# Patient Record
Sex: MALE | Race: WHITE | NOT HISPANIC OR LATINO | Employment: OTHER | URBAN - METROPOLITAN AREA
[De-identification: names, ages, dates, MRNs, and addresses within clinical notes are randomized per-mention and may not be internally consistent; named-entity substitution may affect disease eponyms.]

---

## 2017-01-03 DIAGNOSIS — G47.9 SLEEP DISTURBANCE: ICD-10-CM

## 2017-01-03 RX ORDER — ALPRAZOLAM 1 MG/1
1 TABLET ORAL NIGHTLY PRN
Qty: 30 TAB | Refills: 0 | Status: SHIPPED
Start: 2017-01-03 | End: 2017-02-10 | Stop reason: SDUPTHER

## 2017-02-10 DIAGNOSIS — G47.9 SLEEP DISTURBANCE: ICD-10-CM

## 2017-02-10 NOTE — TELEPHONE ENCOUNTER
Was the patient seen in the last year in this department? Yes  refill 1/9/17    Does patient have an active prescription for medications requested? No     Received Request Via: Patient

## 2017-02-13 RX ORDER — ALPRAZOLAM 1 MG/1
1 TABLET ORAL NIGHTLY PRN
Qty: 30 TAB | Refills: 0 | Status: SHIPPED | OUTPATIENT
Start: 2017-02-13 | End: 2017-12-07 | Stop reason: SDUPTHER

## 2017-02-13 RX ORDER — ALPRAZOLAM 1 MG/1
TABLET ORAL
Qty: 30 TAB | Refills: 0 | Status: SHIPPED | OUTPATIENT
Start: 2017-02-13 | End: 2017-03-16 | Stop reason: SDUPTHER

## 2017-02-13 NOTE — TELEPHONE ENCOUNTER
Prescription called in to:    [facility]/PHARMACY #[id number] - [place], [place] - [address]  [address] [address]  Phone: [phone] Fax: [phone]  .

## 2017-02-13 NOTE — TELEPHONE ENCOUNTER
Was the patient seen in the last year in this department? Yes     Does patient have an active prescription for medications requested? No     Received Request Via: Pharmacy     Per  last fill: 1-9-17 # 30

## 2017-04-06 RX ORDER — PROPRANOLOL HYDROCHLORIDE 20 MG/1
TABLET ORAL
Qty: 60 TAB | Refills: 1 | Status: SHIPPED | OUTPATIENT
Start: 2017-04-06

## 2017-04-13 RX ORDER — ALPRAZOLAM 1 MG/1
TABLET ORAL
Qty: 30 TAB | Refills: 2 | Status: SHIPPED
Start: 2017-04-13

## 2017-04-13 NOTE — TELEPHONE ENCOUNTER
Was the patient seen in the last year in this department? Yes     Does patient have an active prescription for medications requested? No     Received Request Via: Pharmacy   Last filled 3/16/17 per

## 2017-08-09 DIAGNOSIS — G47.00 INSOMNIA, UNSPECIFIED TYPE: ICD-10-CM

## 2017-08-09 RX ORDER — ALPRAZOLAM 1 MG/1
TABLET ORAL
Qty: 15 TAB | Refills: 0 | Status: SHIPPED
Start: 2017-08-09

## 2017-08-09 NOTE — TELEPHONE ENCOUNTER
Was the patient seen in the last year in this department? No     Does patient have an active prescription for medications requested? No     Received Request Via: Pharmacy     Last visit:6/8/17

## 2017-09-11 DIAGNOSIS — G47.9 SLEEP DISTURBANCE: ICD-10-CM

## 2017-09-11 RX ORDER — ALPRAZOLAM 1 MG/1
TABLET ORAL
Qty: 15 TAB | Refills: 0 | Status: SHIPPED
Start: 2017-09-11

## 2017-09-11 NOTE — TELEPHONE ENCOUNTER
Was the patient seen in the last year in this department? No     Does patient have an active prescription for medications requested? No     Received Request Via: Pharmacy     Last visit:6/8/16    Last  fill:8/9/17

## 2017-10-10 RX ORDER — ALPRAZOLAM 1 MG/1
TABLET ORAL
Qty: 15 TAB | Refills: 0 | Status: SHIPPED
Start: 2017-10-10

## 2017-10-10 NOTE — TELEPHONE ENCOUNTER
Was the patient seen in the last year in this department? No LOV: 6-8-16    Does patient have an active prescription for medications requested? No     Received Request Via: Pharmacy     Per  last fill: 9-11-17 # 15

## 2017-11-08 RX ORDER — ALPRAZOLAM 1 MG/1
TABLET ORAL
Qty: 15 TAB | Refills: 0 | Status: SHIPPED
Start: 2017-11-08

## 2017-11-08 NOTE — TELEPHONE ENCOUNTER
Was the patient seen in the last year in this department? No     Does patient have an active prescription for medications requested? No     Received Request Via: Pharmacy     Last visit:6/8/16    Last  fill:10/10/17

## 2017-12-07 DIAGNOSIS — G47.9 SLEEP DISTURBANCE: ICD-10-CM

## 2017-12-07 RX ORDER — ALPRAZOLAM 1 MG/1
1 TABLET ORAL NIGHTLY PRN
Qty: 5 TAB | Refills: 0 | Status: SHIPPED
Start: 2017-12-07

## 2017-12-07 NOTE — TELEPHONE ENCOUNTER
Was the patient seen in the last year in this department? No 6/16  refill 11/9/17    Does patient have an active prescription for medications requested? No     Received Request Via: Pharmacy

## 2021-03-15 DIAGNOSIS — Z23 NEED FOR VACCINATION: ICD-10-CM

## 2022-05-04 NOTE — TELEPHONE ENCOUNTER
Was the patient seen in the last year in this department? Yes  Last Fill 11/29/16 #30    Does patient have an active prescription for medications requested? No     Received Request Via: Patient       no history of blood product transfusion

## 2024-12-09 ENCOUNTER — OFFICE VISIT (OUTPATIENT)
Dept: SURGICAL ONCOLOGY | Facility: MEDICAL CENTER | Age: 59
End: 2024-12-09
Payer: MEDICARE

## 2024-12-09 VITALS
BODY MASS INDEX: 28.14 KG/M2 | HEART RATE: 62 BPM | WEIGHT: 201 LBS | SYSTOLIC BLOOD PRESSURE: 168 MMHG | DIASTOLIC BLOOD PRESSURE: 102 MMHG | TEMPERATURE: 98 F | HEIGHT: 71 IN | OXYGEN SATURATION: 99 %

## 2024-12-09 DIAGNOSIS — K62.89 RECTAL MASS: Primary | ICD-10-CM

## 2024-12-09 PROCEDURE — 99205 OFFICE O/P NEW HI 60 MIN: CPT | Performed by: SURGERY

## 2024-12-09 PROCEDURE — 3080F DIAST BP >= 90 MM HG: CPT | Performed by: SURGERY

## 2024-12-09 PROCEDURE — 3077F SYST BP >= 140 MM HG: CPT | Performed by: SURGERY

## 2024-12-09 NOTE — PROGRESS NOTES
Subjective:   12/9/2024  1:16 PM  Primary care physician:Pcp Pt States None      Chief Complaint:   Chief Complaint   Patient presents with    New Patient     Rectal mass     Diagnosis:   1. Rectal cancer (HCC)  CT-CHEST,ABDOMEN,PELVIS WITH    MR-PELVIS WITH    CEA    CBC WITH DIFFERENTIAL    Comp Metabolic Panel    CT-CHEST,ABDOMEN,PELVIS WITH    MR-PELVIS WITH    CEA          History of presenting illness:  Martínez Lyons  is a pleasant 59 y.o. year old male with history of melena who presented with Rectal mass found that [facility] by Dr. Wesley Mckeon on colonoscopy.  Notably the patient had a number of benign polyps and in the rectum there was a large, fixed, friable tumor extending for half the circumference 6 cm from the anal verge.  Final pathology confirmed fragment of tubular adenoma with high-grade dysplasia cannot exclude adenocarcinoma.  Past Medical History:   Diagnosis Date    Acute renal failure (HCC)     Hepatic cirrhosis (HCC)     liver failure 2009;      Psychiatric disorder     ETOH and drug addiction     Past Surgical History:   Procedure Laterality Date    HERNIA REPAIR      inguinal - right 1993    LAMINOTOMY      microdiscetomy multiple levels - L3-S1 - in [place] - 2001    OTHER ABDOMINAL SURGERY      inguinal hernia repair    OTHER ORTHOPEDIC SURGERY      rt elbow repair     No Known Allergies  Outpatient Encounter Medications as of 12/9/2024   Medication Sig Dispense Refill    alprazolam (XANAX) 1 MG Tab Take 1 Tab by mouth at bedtime as needed for Sleep. (Patient not taking: Reported on 12/9/2024) 5 Tab 0    alprazolam (XANAX) 1 MG Tab TAKE 1 TABLET BY MOUTH AT BEDTIME (Patient not taking: Reported on 12/9/2024) 15 Tab 0    alprazolam (XANAX) 1 MG Tab TAKE 1 TABLET BY MOUTH AT BEDTIME (Patient not taking: Reported on 12/9/2024) 15 Tab 0    alprazolam (XANAX) 1 MG Tab TAKE 1 TABLET BY MOUTH AT BEDTIME (Patient not taking: Reported on 12/9/2024) 15 Tab 0     alprazolam (XANAX) 1 MG Tab TAKE 1 TABLET BY MOUTH AT BEDTIME (Patient not taking: Reported on 12/9/2024) 15 Tab 0    alprazolam (XANAX) 1 MG Tab TAKE 1 TABLET BY MOUTH AT BEDTIME (Patient not taking: Reported on 12/9/2024) 15 Tab 0    alprazolam (XANAX) 1 MG Tab TAKE 1 TABLET BY MOUTH AT BEDTIME (Patient not taking: Reported on 12/9/2024) 30 Tab 2    propranolol (INDERAL) 20 MG Tab TAKE 1 TABLET BY MOUTH TWICE A DAY (Patient not taking: Reported on 12/9/2024) 60 Tab 1    alprazolam (XANAX) 1 MG Tab TAKE 1 TAB BY MOUTH AT BEDTIME (Patient not taking: Reported on 12/9/2024) 30 Tab 0    morphine SR (MS CONTIN) 15 MG Tab CR tablet Take 1 Tab by mouth every 12 hours. (Patient not taking: Reported on 12/9/2024) 56 Tab 0    zolpidem (AMBIEN) 10 MG Tab TAKE 1 TABLET BY MOUTH AT BEDTIME AS NEEDED (Patient not taking: Reported on 12/9/2024) 30 Tab 3    omeprazole (PRILOSEC) 10 MG CPDR Take 20 mg by mouth every day. (Patient not taking: Reported on 12/9/2024)       No facility-administered encounter medications on file as of 12/9/2024.     Social History     Socioeconomic History    Marital status: Single     Spouse name: Not on file    Number of children: Not on file    Years of education: Not on file    Highest education level: Not on file   Occupational History    Not on file   Tobacco Use    Smoking status: Every Day     Current packs/day: 0.50     Average packs/day: 0.5 packs/day for 28.0 years (14.0 ttl pk-yrs)     Types: Cigarettes    Smokeless tobacco: Not on file    Tobacco comments:     1/2 pk/day   Substance and Sexual Activity    Alcohol use: No     Comment: sober for 6 YRS; hx alcoholism [id number]    Drug use: No     Comment: quit 21 months ago. hx heroin    Sexual activity: Not on file     Comment: engaged; one child; disabled   Other Topics Concern    Not on file   Social History Narrative    Not on file     Social Drivers of Health     Financial Resource Strain: Not on file   Food Insecurity: Not on file  "  Transportation Needs: Not on file   Physical Activity: Not on file   Stress: Not on file   Social Connections: Not on file   Intimate Partner Violence: Not on file   Housing Stability: Not on file      Social History     Tobacco Use   Smoking Status Every Day    Current packs/day: 0.50    Average packs/day: 0.5 packs/day for 28.0 years (14.0 ttl pk-yrs)    Types: Cigarettes   Smokeless Tobacco Not on file   Tobacco Comments    1/2 pk/day     Social History     Substance and Sexual Activity   Alcohol Use No    Comment: sober for 6 YRS; hx alcoholism [id number]     Social History     Substance and Sexual Activity   Drug Use No    Comment: quit 21 months ago. hx heroin        Family History   Problem Relation Age of Onset    Heart Disease Father      Review of Systems   Constitutional:  Negative for chills, fever, malaise/fatigue and weight loss.   Eyes:  Negative for blurred vision.   Respiratory:  Negative for cough, hemoptysis and shortness of breath.    Cardiovascular:  Negative for chest pain and leg swelling.   Gastrointestinal:  Negative for blood in stool, constipation, diarrhea, nausea and vomiting.   Genitourinary:  Negative for dysuria, hematuria and urgency.   Musculoskeletal:  Negative for back pain, falls and joint pain.   Skin:  Negative for rash.   Neurological:  Negative for dizziness, weakness and headaches.   Endo/Heme/Allergies: Negative.  Does not bruise/bleed easily.   Psychiatric/Behavioral:  Negative for depression. The patient is not nervous/anxious.         Objective:   BP (!) 168/102 (BP Location: Right arm, Patient Position: Sitting, BP Cuff Size: Adult)   Pulse 62   Temp 36.7 °C (98 °F) (Temporal)   Ht 1.803 m (5' 11\")   Wt 91.2 kg (201 lb)   SpO2 99%   BMI 28.03 kg/m²     Physical Exam  Vitals and nursing note reviewed.   Constitutional:       Appearance: Normal appearance. He is normal weight.   HENT:      Head: Normocephalic and atraumatic.      Nose: Nose normal.      Mouth/Throat: "      Mouth: Mucous membranes are moist.   Eyes:      Extraocular Movements: Extraocular movements intact.   Cardiovascular:      Rate and Rhythm: Normal rate.      Pulses: Normal pulses.   Pulmonary:      Effort: Pulmonary effort is normal.      Breath sounds: Normal breath sounds.   Abdominal:      General: Abdomen is flat.      Palpations: Abdomen is soft.   Genitourinary:     Comments: Rectal examination is deferred until time of flexible sigmoidoscopy  Musculoskeletal:         General: Normal range of motion.      Cervical back: Normal range of motion.   Skin:     General: Skin is warm and dry.   Neurological:      Mental Status: He is alert and oriented to person, place, and time.   Psychiatric:         Mood and Affect: Mood normal.         Behavior: Behavior normal.         Thought Content: Thought content normal.         Judgment: Judgment normal.             Pathology:  Pathology from colonoscopy dated 10/30/2024  Ascending colon polyp was tubular adenoma  Transverse colon polyp was tubulovillous adenoma  Sigmoid colon polyp tubular adenoma  Rectal biopsy with fragmented tubular adenoma with high-grade dysplasia cannot exclude adenocarcinoma      Diagnosis:     1. Rectal cancer (HCC)  CT-CHEST,ABDOMEN,PELVIS WITH    MR-PELVIS WITH    CEA    CBC WITH DIFFERENTIAL    Comp Metabolic Panel    CT-CHEST,ABDOMEN,PELVIS WITH    MR-PELVIS WITH    CEA              Medical Decision Making:  Today's Assessment / Status / Plan:     patient is a very high risk for occult cancer and he will be scheduled for flexible sigmoidoscopy with biopsy as well as staging workup to include CT chest abdomen pelvis and MRI as well as blood work to include CEA in accordance with NCCN guidelines.  Risks, benefits, and alternatives were discussed with consenting person(s). Consenting person(s) were given an opportunity to ask questions and discuss other options. Risks including but not limited to failed or incomplete planned procedure,  ineffective therapy, perforation, infection, bleeding, missed lesion(s), missed diagnosis, cardiac and/or pulmonary event, aspiration, stroke, possible need for surgery, hospitalization possibly prolonged, discomfort, unsuccessful and/or incomplete procedure, possible need for repeat procedures and/or additional testings, damage to adjacent organs and/or vascular structures, medication reaction, disability, death, and other adverse events possibly life-threatening. Discussion was undertaken with Layman's terms. Consenting persons stated understanding and acceptance of these risks, and wished to proceed. Consent was given in clear state of mind.  Chriss Soliz MD PhD  [facility]  Colon and Rectal Surgeon  [phone]

## 2024-12-10 ENCOUNTER — APPOINTMENT (OUTPATIENT)
Dept: ADMISSIONS | Facility: MEDICAL CENTER | Age: 59
End: 2024-12-10
Attending: SURGERY
Payer: MEDICARE

## 2024-12-11 ENCOUNTER — TELEPHONE (OUTPATIENT)
Dept: SURGICAL ONCOLOGY | Facility: MEDICAL CENTER | Age: 59
End: 2024-12-11
Payer: MEDICARE

## 2024-12-16 ENCOUNTER — TELEPHONE (OUTPATIENT)
Dept: SURGICAL ONCOLOGY | Facility: MEDICAL CENTER | Age: 59
End: 2024-12-16
Payer: MEDICARE

## 2024-12-16 ENCOUNTER — PRE-ADMISSION TESTING (OUTPATIENT)
Dept: ADMISSIONS | Facility: MEDICAL CENTER | Age: 59
End: 2024-12-16
Attending: SURGERY
Payer: MEDICARE

## 2024-12-16 RX ORDER — PANTOPRAZOLE SODIUM 40 MG/1
40 TABLET, DELAYED RELEASE ORAL
COMMUNITY
Start: 2024-10-18

## 2024-12-16 ASSESSMENT — ENCOUNTER SYMPTOMS
NAUSEA: 0
CONSTIPATION: 0
DIARRHEA: 0
BACK PAIN: 0
COUGH: 0
FALLS: 0
BLURRED VISION: 0
BRUISES/BLEEDS EASILY: 0
DIZZINESS: 0
DEPRESSION: 0
HEMOPTYSIS: 0
CHILLS: 0
BLOOD IN STOOL: 0
NERVOUS/ANXIOUS: 0
FEVER: 0
VOMITING: 0
SHORTNESS OF BREATH: 0
HEADACHES: 0
WEIGHT LOSS: 0
WEAKNESS: 0

## 2024-12-16 NOTE — PREADMIT AVS NOTE
Current Medications   Medication Instructions    pantoprazole (PROTONIX) 40 MG Tablet Delayed Response Continue taking medication as prescribed, including morning of procedure

## 2024-12-17 NOTE — TELEPHONE ENCOUNTER
Patient called stating he is trying to schedule the CT and MRI Dr. Soliz wanted done.But it looks like it hasn't been ordered.  Could you please put in orders, Thank you

## 2024-12-18 DIAGNOSIS — K62.89 RECTAL MASS: ICD-10-CM

## 2024-12-27 ENCOUNTER — HOSPITAL ENCOUNTER (OUTPATIENT)
Dept: RADIOLOGY | Facility: MEDICAL CENTER | Age: 59
End: 2024-12-27
Attending: SURGERY
Payer: MEDICARE

## 2024-12-27 DIAGNOSIS — K62.89 RECTAL MASS: ICD-10-CM

## 2024-12-27 PROCEDURE — 700117 HCHG RX CONTRAST REV CODE 255: Mod: JZ | Performed by: SURGERY

## 2024-12-27 PROCEDURE — 71260 CT THORAX DX C+: CPT

## 2024-12-27 PROCEDURE — 700117 HCHG RX CONTRAST REV CODE 255: Performed by: SURGERY

## 2024-12-27 PROCEDURE — 700111 HCHG RX REV CODE 636 W/ 250 OVERRIDE (IP): Mod: JZ,JG | Performed by: RADIOLOGY

## 2024-12-27 PROCEDURE — A9579 GAD-BASE MR CONTRAST NOS,1ML: HCPCS | Mod: JZ | Performed by: SURGERY

## 2024-12-27 PROCEDURE — 72197 MRI PELVIS W/O & W/DYE: CPT

## 2024-12-27 RX ADMIN — GADOTERIDOL 20 ML: 279.3 INJECTION, SOLUTION INTRAVENOUS at 14:39

## 2024-12-27 RX ADMIN — GLUCAGON 1 MG: 1 INJECTION, POWDER, LYOPHILIZED, FOR SOLUTION INTRAMUSCULAR; INTRAVENOUS at 13:20

## 2024-12-27 RX ADMIN — IOHEXOL 100 ML: 350 INJECTION, SOLUTION INTRAVENOUS at 15:38

## 2024-12-31 ENCOUNTER — ANESTHESIA (OUTPATIENT)
Dept: SURGERY | Facility: MEDICAL CENTER | Age: 59
End: 2024-12-31
Payer: MEDICARE

## 2024-12-31 ENCOUNTER — HOSPITAL ENCOUNTER (OUTPATIENT)
Facility: MEDICAL CENTER | Age: 59
End: 2024-12-31
Attending: SURGERY | Admitting: HOSPITALIST
Payer: MEDICARE

## 2024-12-31 ENCOUNTER — ANESTHESIA EVENT (OUTPATIENT)
Dept: SURGERY | Facility: MEDICAL CENTER | Age: 59
End: 2024-12-31
Payer: MEDICARE

## 2024-12-31 VITALS
HEIGHT: 71 IN | OXYGEN SATURATION: 95 % | RESPIRATION RATE: 16 BRPM | TEMPERATURE: 98.6 F | HEART RATE: 77 BPM | WEIGHT: 189.6 LBS | SYSTOLIC BLOOD PRESSURE: 153 MMHG | BODY MASS INDEX: 26.54 KG/M2 | DIASTOLIC BLOOD PRESSURE: 73 MMHG

## 2024-12-31 DIAGNOSIS — I10 HTN (HYPERTENSION), MALIGNANT: ICD-10-CM

## 2024-12-31 PROBLEM — K62.89 RECTAL MASS: Status: ACTIVE | Noted: 2024-12-31

## 2024-12-31 LAB
ALBUMIN SERPL BCP-MCNC: 4.4 G/DL (ref 3.2–4.9)
ALBUMIN/GLOB SERPL: 1.6 G/DL
ALP SERPL-CCNC: 79 U/L (ref 30–99)
ALT SERPL-CCNC: 13 U/L (ref 2–50)
ANION GAP SERPL CALC-SCNC: 10 MMOL/L (ref 7–16)
AST SERPL-CCNC: 19 U/L (ref 12–45)
BILIRUB SERPL-MCNC: 0.6 MG/DL (ref 0.1–1.5)
BUN SERPL-MCNC: 10 MG/DL (ref 8–22)
CALCIUM ALBUM COR SERPL-MCNC: 9.3 MG/DL (ref 8.5–10.5)
CALCIUM SERPL-MCNC: 9.6 MG/DL (ref 8.5–10.5)
CEA SERPL-MCNC: 2.2 NG/ML (ref 0–3)
CHLORIDE SERPL-SCNC: 106 MMOL/L (ref 96–112)
CO2 SERPL-SCNC: 24 MMOL/L (ref 20–33)
CREAT SERPL-MCNC: 1.15 MG/DL (ref 0.5–1.4)
ERYTHROCYTE [DISTWIDTH] IN BLOOD BY AUTOMATED COUNT: 41.3 FL (ref 35.9–50)
GFR SERPLBLD CREATININE-BSD FMLA CKD-EPI: 73 ML/MIN/1.73 M 2
GLOBULIN SER CALC-MCNC: 2.8 G/DL (ref 1.9–3.5)
GLUCOSE SERPL-MCNC: 97 MG/DL (ref 65–99)
HCT VFR BLD AUTO: 40.8 % (ref 42–52)
HGB BLD-MCNC: 13.1 G/DL (ref 14–18)
INR PPP: 1.06 (ref 0.87–1.13)
MCH RBC QN AUTO: 27.3 PG (ref 27–33)
MCHC RBC AUTO-ENTMCNC: 32.1 G/DL (ref 32.3–36.5)
MCV RBC AUTO: 85 FL (ref 81.4–97.8)
PATHOLOGY CONSULT NOTE: NORMAL
PLATELET # BLD AUTO: 244 K/UL (ref 164–446)
PMV BLD AUTO: 9 FL (ref 9–12.9)
POTASSIUM SERPL-SCNC: 3.9 MMOL/L (ref 3.6–5.5)
PROT SERPL-MCNC: 7.2 G/DL (ref 6–8.2)
PROTHROMBIN TIME: 13.8 SEC (ref 12–14.6)
RBC # BLD AUTO: 4.8 M/UL (ref 4.7–6.1)
SODIUM SERPL-SCNC: 140 MMOL/L (ref 135–145)
WBC # BLD AUTO: 6.9 K/UL (ref 4.8–10.8)

## 2024-12-31 PROCEDURE — 160035 HCHG PACU - 1ST 60 MINS PHASE I: Performed by: SURGERY

## 2024-12-31 PROCEDURE — 88305 TISSUE EXAM BY PATHOLOGIST: CPT

## 2024-12-31 PROCEDURE — 700111 HCHG RX REV CODE 636 W/ 250 OVERRIDE (IP): Mod: JZ | Performed by: HOSPITALIST

## 2024-12-31 PROCEDURE — 700105 HCHG RX REV CODE 258: Performed by: ANESTHESIOLOGY

## 2024-12-31 PROCEDURE — 160027 HCHG SURGERY MINUTES - 1ST 30 MINS LEVEL 2: Performed by: SURGERY

## 2024-12-31 PROCEDURE — 45331 SIGMOIDOSCOPY AND BIOPSY: CPT | Performed by: SURGERY

## 2024-12-31 PROCEDURE — 96374 THER/PROPH/DIAG INJ IV PUSH: CPT | Mod: XU

## 2024-12-31 PROCEDURE — 160009 HCHG ANES TIME/MIN: Performed by: SURGERY

## 2024-12-31 PROCEDURE — 85027 COMPLETE CBC AUTOMATED: CPT

## 2024-12-31 PROCEDURE — 82378 CARCINOEMBRYONIC ANTIGEN: CPT

## 2024-12-31 PROCEDURE — 85610 PROTHROMBIN TIME: CPT

## 2024-12-31 PROCEDURE — 700102 HCHG RX REV CODE 250 W/ 637 OVERRIDE(OP): Performed by: HOSPITALIST

## 2024-12-31 PROCEDURE — G0378 HOSPITAL OBSERVATION PER HR: HCPCS

## 2024-12-31 PROCEDURE — 160048 HCHG OR STATISTICAL LEVEL 1-5: Performed by: SURGERY

## 2024-12-31 PROCEDURE — 700111 HCHG RX REV CODE 636 W/ 250 OVERRIDE (IP): Mod: JZ | Performed by: ANESTHESIOLOGY

## 2024-12-31 PROCEDURE — 36415 COLL VENOUS BLD VENIPUNCTURE: CPT

## 2024-12-31 PROCEDURE — A9270 NON-COVERED ITEM OR SERVICE: HCPCS | Performed by: HOSPITALIST

## 2024-12-31 PROCEDURE — 160002 HCHG RECOVERY MINUTES (STAT): Performed by: SURGERY

## 2024-12-31 PROCEDURE — 700111 HCHG RX REV CODE 636 W/ 250 OVERRIDE (IP): Performed by: ANESTHESIOLOGY

## 2024-12-31 PROCEDURE — 88341 IMHCHEM/IMCYTCHM EA ADD ANTB: CPT | Mod: 91

## 2024-12-31 PROCEDURE — 99222 1ST HOSP IP/OBS MODERATE 55: CPT | Performed by: HOSPITALIST

## 2024-12-31 PROCEDURE — 80053 COMPREHEN METABOLIC PANEL: CPT

## 2024-12-31 PROCEDURE — 88342 IMHCHEM/IMCYTCHM 1ST ANTB: CPT

## 2024-12-31 PROCEDURE — 700101 HCHG RX REV CODE 250: Performed by: ANESTHESIOLOGY

## 2024-12-31 PROCEDURE — 160036 HCHG PACU - EA ADDL 30 MINS PHASE I: Performed by: SURGERY

## 2024-12-31 RX ORDER — OXYCODONE HYDROCHLORIDE 5 MG/1
5 TABLET ORAL
Status: DISCONTINUED | OUTPATIENT
Start: 2024-12-31 | End: 2025-01-01 | Stop reason: HOSPADM

## 2024-12-31 RX ORDER — HYDROMORPHONE HYDROCHLORIDE 1 MG/ML
0.2 INJECTION, SOLUTION INTRAMUSCULAR; INTRAVENOUS; SUBCUTANEOUS
Status: DISCONTINUED | OUTPATIENT
Start: 2024-12-31 | End: 2024-12-31 | Stop reason: HOSPADM

## 2024-12-31 RX ORDER — PANTOPRAZOLE SODIUM 40 MG/1
40 TABLET, DELAYED RELEASE ORAL
Status: DISCONTINUED | OUTPATIENT
Start: 2024-12-31 | End: 2024-12-31

## 2024-12-31 RX ORDER — HYDROMORPHONE HYDROCHLORIDE 1 MG/ML
0.4 INJECTION, SOLUTION INTRAMUSCULAR; INTRAVENOUS; SUBCUTANEOUS
Status: DISCONTINUED | OUTPATIENT
Start: 2024-12-31 | End: 2024-12-31 | Stop reason: HOSPADM

## 2024-12-31 RX ORDER — HALOPERIDOL 5 MG/ML
1 INJECTION INTRAMUSCULAR
Status: DISCONTINUED | OUTPATIENT
Start: 2024-12-31 | End: 2024-12-31 | Stop reason: HOSPADM

## 2024-12-31 RX ORDER — SODIUM CHLORIDE, SODIUM LACTATE, POTASSIUM CHLORIDE, CALCIUM CHLORIDE 600; 310; 30; 20 MG/100ML; MG/100ML; MG/100ML; MG/100ML
INJECTION, SOLUTION INTRAVENOUS CONTINUOUS
Status: ACTIVE | OUTPATIENT
Start: 2024-12-31 | End: 2024-12-31

## 2024-12-31 RX ORDER — LABETALOL HYDROCHLORIDE 5 MG/ML
5 INJECTION, SOLUTION INTRAVENOUS
Status: COMPLETED | OUTPATIENT
Start: 2024-12-31 | End: 2024-12-31

## 2024-12-31 RX ORDER — METOPROLOL TARTRATE 1 MG/ML
1 INJECTION, SOLUTION INTRAVENOUS
Status: DISCONTINUED | OUTPATIENT
Start: 2024-12-31 | End: 2024-12-31 | Stop reason: HOSPADM

## 2024-12-31 RX ORDER — HYDROMORPHONE HYDROCHLORIDE 1 MG/ML
0.1 INJECTION, SOLUTION INTRAMUSCULAR; INTRAVENOUS; SUBCUTANEOUS
Status: DISCONTINUED | OUTPATIENT
Start: 2024-12-31 | End: 2024-12-31 | Stop reason: HOSPADM

## 2024-12-31 RX ORDER — SODIUM CHLORIDE, SODIUM LACTATE, POTASSIUM CHLORIDE, CALCIUM CHLORIDE 600; 310; 30; 20 MG/100ML; MG/100ML; MG/100ML; MG/100ML
INJECTION, SOLUTION INTRAVENOUS
Status: DISCONTINUED | OUTPATIENT
Start: 2024-12-31 | End: 2024-12-31 | Stop reason: SURG

## 2024-12-31 RX ORDER — ALBUTEROL SULFATE 5 MG/ML
2.5 SOLUTION RESPIRATORY (INHALATION)
Status: DISCONTINUED | OUTPATIENT
Start: 2024-12-31 | End: 2024-12-31 | Stop reason: HOSPADM

## 2024-12-31 RX ORDER — MIDAZOLAM HYDROCHLORIDE 1 MG/ML
1 INJECTION INTRAMUSCULAR; INTRAVENOUS
Status: DISCONTINUED | OUTPATIENT
Start: 2024-12-31 | End: 2024-12-31 | Stop reason: HOSPADM

## 2024-12-31 RX ORDER — HYDRALAZINE HYDROCHLORIDE 20 MG/ML
10 INJECTION INTRAMUSCULAR; INTRAVENOUS EVERY 6 HOURS PRN
Status: DISCONTINUED | OUTPATIENT
Start: 2024-12-31 | End: 2024-12-31

## 2024-12-31 RX ORDER — ONDANSETRON 2 MG/ML
4 INJECTION INTRAMUSCULAR; INTRAVENOUS
Status: DISCONTINUED | OUTPATIENT
Start: 2024-12-31 | End: 2024-12-31 | Stop reason: HOSPADM

## 2024-12-31 RX ORDER — ACETAMINOPHEN 500 MG
1000 TABLET ORAL EVERY 6 HOURS
Status: DISCONTINUED | OUTPATIENT
Start: 2024-12-31 | End: 2025-01-01 | Stop reason: HOSPADM

## 2024-12-31 RX ORDER — SODIUM CHLORIDE, SODIUM LACTATE, POTASSIUM CHLORIDE, CALCIUM CHLORIDE 600; 310; 30; 20 MG/100ML; MG/100ML; MG/100ML; MG/100ML
INJECTION, SOLUTION INTRAVENOUS CONTINUOUS
Status: DISCONTINUED | OUTPATIENT
Start: 2024-12-31 | End: 2024-12-31 | Stop reason: HOSPADM

## 2024-12-31 RX ORDER — EPHEDRINE SULFATE 50 MG/ML
5 INJECTION, SOLUTION INTRAVENOUS
Status: DISCONTINUED | OUTPATIENT
Start: 2024-12-31 | End: 2024-12-31 | Stop reason: HOSPADM

## 2024-12-31 RX ORDER — MIDAZOLAM HYDROCHLORIDE 1 MG/ML
INJECTION INTRAMUSCULAR; INTRAVENOUS PRN
Status: DISCONTINUED | OUTPATIENT
Start: 2024-12-31 | End: 2024-12-31 | Stop reason: SURG

## 2024-12-31 RX ORDER — OXYCODONE HCL 5 MG/5 ML
10 SOLUTION, ORAL ORAL
Status: DISCONTINUED | OUTPATIENT
Start: 2024-12-31 | End: 2024-12-31 | Stop reason: HOSPADM

## 2024-12-31 RX ORDER — HYDRALAZINE HYDROCHLORIDE 20 MG/ML
5 INJECTION INTRAMUSCULAR; INTRAVENOUS
Status: DISCONTINUED | OUTPATIENT
Start: 2024-12-31 | End: 2024-12-31 | Stop reason: HOSPADM

## 2024-12-31 RX ORDER — HYDROMORPHONE HYDROCHLORIDE 1 MG/ML
0.5 INJECTION, SOLUTION INTRAMUSCULAR; INTRAVENOUS; SUBCUTANEOUS
Status: DISCONTINUED | OUTPATIENT
Start: 2024-12-31 | End: 2025-01-01 | Stop reason: HOSPADM

## 2024-12-31 RX ORDER — LIDOCAINE HYDROCHLORIDE 20 MG/ML
INJECTION, SOLUTION EPIDURAL; INFILTRATION; INTRACAUDAL; PERINEURAL PRN
Status: DISCONTINUED | OUTPATIENT
Start: 2024-12-31 | End: 2024-12-31 | Stop reason: SURG

## 2024-12-31 RX ORDER — OXYCODONE HYDROCHLORIDE 10 MG/1
10 TABLET ORAL
Status: DISCONTINUED | OUTPATIENT
Start: 2024-12-31 | End: 2025-01-01 | Stop reason: HOSPADM

## 2024-12-31 RX ORDER — HYDRALAZINE HYDROCHLORIDE 20 MG/ML
10 INJECTION INTRAMUSCULAR; INTRAVENOUS EVERY 6 HOURS PRN
Status: DISCONTINUED | OUTPATIENT
Start: 2024-12-31 | End: 2025-01-01 | Stop reason: HOSPADM

## 2024-12-31 RX ORDER — OXYCODONE HCL 5 MG/5 ML
5 SOLUTION, ORAL ORAL
Status: DISCONTINUED | OUTPATIENT
Start: 2024-12-31 | End: 2024-12-31 | Stop reason: HOSPADM

## 2024-12-31 RX ORDER — DIPHENHYDRAMINE HYDROCHLORIDE 50 MG/ML
12.5 INJECTION INTRAMUSCULAR; INTRAVENOUS
Status: DISCONTINUED | OUTPATIENT
Start: 2024-12-31 | End: 2024-12-31 | Stop reason: HOSPADM

## 2024-12-31 RX ORDER — ACETAMINOPHEN 500 MG
1000 TABLET ORAL EVERY 6 HOURS PRN
Status: DISCONTINUED | OUTPATIENT
Start: 2025-01-05 | End: 2025-01-01 | Stop reason: HOSPADM

## 2024-12-31 RX ORDER — LOSARTAN POTASSIUM 50 MG/1
25 TABLET ORAL
Status: DISCONTINUED | OUTPATIENT
Start: 2024-12-31 | End: 2025-01-01

## 2024-12-31 RX ADMIN — OMEPRAZOLE 20 MG: 20 CAPSULE, DELAYED RELEASE ORAL at 16:08

## 2024-12-31 RX ADMIN — HYDRALAZINE HYDROCHLORIDE 10 MG: 20 INJECTION, SOLUTION INTRAMUSCULAR; INTRAVENOUS at 19:45

## 2024-12-31 RX ADMIN — FENTANYL CITRATE 100 MCG: 50 INJECTION, SOLUTION INTRAMUSCULAR; INTRAVENOUS at 08:40

## 2024-12-31 RX ADMIN — LOSARTAN POTASSIUM 25 MG: 50 TABLET, FILM COATED ORAL at 16:04

## 2024-12-31 RX ADMIN — ACETAMINOPHEN 1000 MG: 500 TABLET ORAL at 17:27

## 2024-12-31 RX ADMIN — LABETALOL HYDROCHLORIDE 5 MG: 5 INJECTION, SOLUTION INTRAVENOUS at 11:35

## 2024-12-31 RX ADMIN — MIDAZOLAM HYDROCHLORIDE 2 MG: 1 INJECTION, SOLUTION INTRAMUSCULAR; INTRAVENOUS at 08:36

## 2024-12-31 RX ADMIN — LABETALOL HYDROCHLORIDE 5 MG: 5 INJECTION, SOLUTION INTRAVENOUS at 10:53

## 2024-12-31 RX ADMIN — LIDOCAINE HYDROCHLORIDE 80 MG: 20 INJECTION, SOLUTION EPIDURAL; INFILTRATION; INTRACAUDAL; PERINEURAL at 08:40

## 2024-12-31 RX ADMIN — SODIUM CHLORIDE, POTASSIUM CHLORIDE, SODIUM LACTATE AND CALCIUM CHLORIDE: 600; 310; 30; 20 INJECTION, SOLUTION INTRAVENOUS at 08:33

## 2024-12-31 RX ADMIN — HYDRALAZINE HYDROCHLORIDE 5 MG: 20 INJECTION INTRAMUSCULAR; INTRAVENOUS at 10:07

## 2024-12-31 RX ADMIN — LABETALOL HYDROCHLORIDE 5 MG: 5 INJECTION, SOLUTION INTRAVENOUS at 11:05

## 2024-12-31 RX ADMIN — HYDRALAZINE HYDROCHLORIDE 5 MG: 20 INJECTION INTRAMUSCULAR; INTRAVENOUS at 09:50

## 2024-12-31 RX ADMIN — HYDRALAZINE HYDROCHLORIDE 5 MG: 20 INJECTION INTRAMUSCULAR; INTRAVENOUS at 09:25

## 2024-12-31 RX ADMIN — PROPOFOL 50 MG: 10 INJECTION, EMULSION INTRAVENOUS at 08:40

## 2024-12-31 SDOH — ECONOMIC STABILITY: TRANSPORTATION INSECURITY
IN THE PAST 12 MONTHS, HAS THE LACK OF TRANSPORTATION KEPT YOU FROM MEDICAL APPOINTMENTS OR FROM GETTING MEDICATIONS?: NO

## 2024-12-31 SDOH — ECONOMIC STABILITY: TRANSPORTATION INSECURITY
IN THE PAST 12 MONTHS, HAS LACK OF RELIABLE TRANSPORTATION KEPT YOU FROM MEDICAL APPOINTMENTS, MEETINGS, WORK OR FROM GETTING THINGS NEEDED FOR DAILY LIVING?: NO

## 2024-12-31 ASSESSMENT — COGNITIVE AND FUNCTIONAL STATUS - GENERAL
DAILY ACTIVITIY SCORE: 24
SUGGESTED CMS G CODE MODIFIER DAILY ACTIVITY: CH
SUGGESTED CMS G CODE MODIFIER MOBILITY: CH
MOBILITY SCORE: 24

## 2024-12-31 ASSESSMENT — LIFESTYLE VARIABLES
EVER FELT BAD OR GUILTY ABOUT YOUR DRINKING: NO
DOES PATIENT WANT TO STOP DRINKING: NO
TOTAL SCORE: 0
TOTAL SCORE: 0
CONSUMPTION TOTAL: NEGATIVE
EVER HAD A DRINK FIRST THING IN THE MORNING TO STEADY YOUR NERVES TO GET RID OF A HANGOVER: NO
AVERAGE NUMBER OF DAYS PER WEEK YOU HAVE A DRINK CONTAINING ALCOHOL: 0
HAVE YOU EVER FELT YOU SHOULD CUT DOWN ON YOUR DRINKING: NO
ALCOHOL_USE: NO
HOW MANY TIMES IN THE PAST YEAR HAVE YOU HAD 5 OR MORE DRINKS IN A DAY: 0
ON A TYPICAL DAY WHEN YOU DRINK ALCOHOL HOW MANY DRINKS DO YOU HAVE: 0
HAVE PEOPLE ANNOYED YOU BY CRITICIZING YOUR DRINKING: NO
TOTAL SCORE: 0

## 2024-12-31 ASSESSMENT — ENCOUNTER SYMPTOMS
EYES NEGATIVE: 1
CARDIOVASCULAR NEGATIVE: 1
RESPIRATORY NEGATIVE: 1
BLOOD IN STOOL: 1
MUSCULOSKELETAL NEGATIVE: 1
PSYCHIATRIC NEGATIVE: 1
CONSTITUTIONAL NEGATIVE: 1
BRUISES/BLEEDS EASILY: 1
NEUROLOGICAL NEGATIVE: 1

## 2024-12-31 ASSESSMENT — SOCIAL DETERMINANTS OF HEALTH (SDOH)
IN THE PAST 12 MONTHS, HAS THE ELECTRIC, GAS, OIL, OR WATER COMPANY THREATENED TO SHUT OFF SERVICE IN YOUR HOME?: NO
WITHIN THE PAST 12 MONTHS, YOU WORRIED THAT YOUR FOOD WOULD RUN OUT BEFORE YOU GOT THE MONEY TO BUY MORE: NEVER TRUE
WITHIN THE LAST YEAR, HAVE YOU BEEN HUMILIATED OR EMOTIONALLY ABUSED IN OTHER WAYS BY YOUR PARTNER OR EX-PARTNER?: NO
WITHIN THE LAST YEAR, HAVE YOU BEEN KICKED, HIT, SLAPPED, OR OTHERWISE PHYSICALLY HURT BY YOUR PARTNER OR EX-PARTNER?: NO
WITHIN THE LAST YEAR, HAVE YOU BEEN AFRAID OF YOUR PARTNER OR EX-PARTNER?: NO
WITHIN THE PAST 12 MONTHS, THE FOOD YOU BOUGHT JUST DIDN'T LAST AND YOU DIDN'T HAVE MONEY TO GET MORE: NEVER TRUE
WITHIN THE LAST YEAR, HAVE TO BEEN RAPED OR FORCED TO HAVE ANY KIND OF SEXUAL ACTIVITY BY YOUR PARTNER OR EX-PARTNER?: NO

## 2024-12-31 ASSESSMENT — PATIENT HEALTH QUESTIONNAIRE - PHQ9
SUM OF ALL RESPONSES TO PHQ9 QUESTIONS 1 AND 2: 0
1. LITTLE INTEREST OR PLEASURE IN DOING THINGS: NOT AT ALL
2. FEELING DOWN, DEPRESSED, IRRITABLE, OR HOPELESS: NOT AT ALL

## 2024-12-31 ASSESSMENT — PAIN DESCRIPTION - PAIN TYPE
TYPE: ACUTE PAIN
TYPE: ACUTE PAIN
TYPE: SURGICAL PAIN
TYPE: ACUTE PAIN
TYPE: SURGICAL PAIN

## 2024-12-31 ASSESSMENT — PAIN SCALES - GENERAL: PAIN_LEVEL: 0

## 2024-12-31 ASSESSMENT — FIBROSIS 4 INDEX: FIB4 SCORE: 1.27

## 2024-12-31 NOTE — OR NURSING
Patient VSS. A+Ox4. Stats has hx of right hip pain but does not take meds for that.  Declines pain meds.  B/p 170/86, patient states he does not take anti HTN meds.  B/p as pre-op.  Plan for patient to discharge home when meets sds criteria.  Wife updated

## 2024-12-31 NOTE — ANESTHESIA TIME REPORT
Anesthesia Start and Stop Event Times       Date Time Event    12/31/2024 0831 Ready for Procedure     0833 Anesthesia Start     0846 Anesthesia Stop          Responsible Staff  12/31/24      Name Role Begin End    Marbin Man D.O. Anesth 0833 0846          Overtime Reason:  no overtime (within assigned shift)    Comments:

## 2024-12-31 NOTE — PROGRESS NOTES
Medication history reviewed with PT at bedside    Med rec is complete per PT reporting    Allergies reviewed.     Patient denies any outpatient antibiotics in the last 30 days.     Patient is not taking anticoagulants.    Preferred pharmacy for this visit - [facility] ([phone])

## 2024-12-31 NOTE — ASSESSMENT & PLAN NOTE
Patient was initiated on Cozaar 25 mg p.o. daily     will continue to monitor and titrate accordingly.      Patient will be on 2 g sodium diet     IV hydralazine as needed for SBP greater than 160

## 2024-12-31 NOTE — ANESTHESIA POSTPROCEDURE EVALUATION
Patient: Martínez Lyons Jr.    Procedure Summary       Date: 12/31/24 Room / Location: [place] [address] / SURGERY [facility]    Anesthesia Start: 0833 Anesthesia Stop: 0846    Procedure: FLEXIBLE SIGMOIDOSCOPY WITH BIOPSY (Anus) Diagnosis: (RECTAL MASS)    Surgeons: Chriss Soliz M.D. Responsible Provider: Marbin Man D.O.    Anesthesia Type: MAC ASA Status: 2            Final Anesthesia Type: MAC  Last vitals  BP   Blood Pressure: 134/69    Temp   36.6 °C (97.8 °F)    Pulse   (!) 52   Resp   14    SpO2   98 %      Anesthesia Post Evaluation    Patient location during evaluation: PACU  Patient participation: complete - patient participated  Level of consciousness: awake and alert  Pain score: 0    Airway patency: patent  Anesthetic complications: no  Cardiovascular status: hemodynamically stable  Respiratory status: acceptable  Hydration status: euvolemic    PONV: none          No notable events documented.

## 2024-12-31 NOTE — DISCHARGE INSTR - OTHER INFO
1. Take all of your prescription medications as before but do not use Aspirin or Antiinflammatories  for 24 hours.  2. Make an appointment with your doctor to be seen in 2 weeks from the date of  your procedure.  3. You may feel bloated after the procedure because of air that  was introduced during the examination.  4. Watch carefully for the following symptoms:   Fever or chills.   Abdominal pain which is severe and lasting more than 30-60 minutes- mild cramps  are common for the first 6-12 hours after.   Nausea or vomiting.   Bleeding or black stools.   Any unusual pain or problem.   Pain or redness at the site where the intravenous needle was placed.  If any of these symptoms occur, call your doctor or go to the nearest emergency  department .  6. Rest. During your procedure you were given sedatives that may make you feel tired  7. Do not drink alcohol for 24 hours. Alcohol potentiates the effects of the sedatives given.  The combination of alcohol and the sedatives has an unpredictable effect on your body  that is potentially dangerous to your health.  8. Do not drive or operate machinery until the next day. Driving or operating machinery  takes concentration and the ability to respond rapidly; the sedative adversely affects both.  If you have an engagement that cannot be cancelled, we advise that you have someone  drive you.  9. Do not sign contracts or legal documents until the next day. The sedatives slow down  your body and your mind. Your ability to objectively evaluate may be impaired.  10. Please contact us if you have an unplanned admission to a hospital within 10 days of  this procedure.  Chriss Soliz MD PhD  [facility]  Colon and Rectal Surgeon  [phone]. Take all of your prescription medications as before but do not use Aspirin or Antiinflammatories  for 24 hours.  2. Make an appointment with your doctor to be seen in 2 weeks from the date of  your procedure.  3. You may feel  bloated after the procedure because of air that  was introduced during the examination.  4. Watch carefully for the following symptoms:   Fever or chills.   Abdominal pain which is severe and lasting more than 30-60 minutes- mild cramps  are common for the first 6-12 hours after.   Nausea or vomiting.   Bleeding or black stools.   Any unusual pain or problem.   Pain or redness at the site where the intravenous needle was placed.  If any of these symptoms occur, call your doctor or go to the nearest emergency  department .  6. Rest. During your procedure you were given sedatives that may make you feel tired  7. Do not drink alcohol for 24 hours. Alcohol potentiates the effects of the sedatives given.  The combination of alcohol and the sedatives has an unpredictable effect on your body  that is potentially dangerous to your health.  8. Do not drive or operate machinery until the next day. Driving or operating machinery  takes concentration and the ability to respond rapidly; the sedative adversely affects both.  If you have an engagement that cannot be cancelled, we advise that you have someone  drive you.  9. Do not sign contracts or legal documents until the next day. The sedatives slow down  your body and your mind. Your ability to objectively evaluate may be impaired.  10. Please contact us if you have an unplanned admission to a hospital within 10 days of  this procedure.  Chriss Soliz MD PhD  [facility]  Colon and Rectal Surgeon  [phone]

## 2024-12-31 NOTE — OR NURSING
S/O Crystal is being picked up by friend.  Will let patient know when he wakes up.  Patient asleep.  VSS

## 2024-12-31 NOTE — ASSESSMENT & PLAN NOTE
S/p biopsy    Monitor for bleeding and get a follow-up hemoglobin in the a.m.    Multi Modal pain management

## 2024-12-31 NOTE — DISCHARGE INSTRUCTIONS
HOME CARE INSTRUCTIONS    ACTIVITY: Rest and take it easy for the first 24 hours.  A responsible adult is recommended to remain with you during that time.  It is normal to feel sleepy.  We encourage you to not do anything that requires balance, judgment or coordination.    FOR 24 HOURS DO NOT:  Drive, operate machinery or run household appliances.  Drink beer or alcoholic beverages.  Make important decisions or sign legal documents.        DIET: To avoid nausea, slowly advance diet as tolerated, avoiding spicy or greasy foods for the first day.  Add more substantial food to your diet according to your physician's instructions.  Babies can be fed formula or breast milk as soon as they are hungry.  INCREASE FLUIDS AND FIBER TO AVOID CONSTIPATION.    MEDICATIONS: Resume taking daily medication.  Take prescribed pain medication with food.  If no medication is prescribed, you may take non-aspirin pain medication if needed.  PAIN MEDICATION CAN BE VERY CONSTIPATING.  Take a stool softener or laxative such as senokot, pericolace, or milk of magnesia if needed.    A follow-up appointment should be arranged with your doctor in 1-2 weeks; call to schedule.    You should CALL YOUR PHYSICIAN if you develop:  Fever greater than 101 degrees F.  Pain not relieved by medication, or persistent nausea or vomiting.  Excessive bleeding (blood soaking through dressing) or unexpected drainage from the wound.  Extreme redness or swelling around the incision site, drainage of pus or foul smelling drainage.  Inability to urinate or empty your bladder within 8 hours.  Problems with breathing or chest pain.    You should call 911 if you develop problems with breathing or chest pain.  If you are unable to contact your doctor or surgical center, you should go to the nearest emergency room or urgent care center.  Physician's telephone #: [phone]    MILD FLU-LIKE SYMPTOMS ARE NORMAL.  YOU MAY EXPERIENCE GENERALIZED MUSCLE ACHES, THROAT  IRRITATION, HEADACHE AND/OR SOME NAUSEA.    If any questions arise, call your doctor.  If your doctor is not available, please feel free to call the Surgical Center at [phone].  The Center is open Monday through Friday from 7AM to 7PM.      A registered nurse may call you a few days after your surgery to see how you are doing after your procedure.    You may also receive a survey in the mail within the next two weeks and we ask that you take a few moments to complete the survey and return it to us.  Our goal is to provide you with very good care and we value your comments.     Depression / Suicide Risk    As you are discharged from this [facility] facility, it is important to learn how to keep safe from harming yourself.    Recognize the warning signs:  Abrupt changes in personality, positive or negative- including increase in energy   Giving away possessions  Change in eating patterns- significant weight changes-  positive or negative  Change in sleeping patterns- unable to sleep or sleeping all the time   Unwillingness or inability to communicate  Depression  Unusual sadness, discouragement and loneliness  Talk of wanting to die  Neglect of personal appearance   Rebelliousness- reckless behavior  Withdrawal from people/activities they love  Confusion- inability to concentrate     If you or a loved one observes any of these behaviors or has concerns about self-harm, here's what you can do:  Talk about it- your feelings and reasons for harming yourself  Remove any means that you might use to hurt yourself (examples: pills, rope, extension cords, firearm)  Get professional help from the community (Mental Health, Substance Abuse, psychological counseling)  Do not be alone:Call your Safe Contact- someone whom you trust who will be there for you.  Call your local CRISIS HOTLINE 415-3324 or 166-803-0840  Call your local Children's Mobile Crisis Response Team [place] (770) 564-8035 or  www.[employer]gent [employer].EnhanCV  Call the toll free National Suicide Prevention Hotlines   National Suicide Prevention Lifeline 929-208-ADKF (3980)  National [place] Line Network 800-SUICIDE (732-8842)    I acknowledge receipt and understanding of these Home Care instructions.

## 2024-12-31 NOTE — OR NURSING
Patient plan to admit for b/p control.  S/O at bedside and updated by Dr Soliz about patient POC.  Still treating patient for b/p as needed.  Belongings at bedside for transfer to floor

## 2024-12-31 NOTE — ANESTHESIA PREPROCEDURE EVALUATION
Case: [id number] Date/Time: 12/31/24 0915    Procedure: FLEXIBLE SIGMOIDOSCOPY WITH BIOPSY    Pre-op diagnosis: RECTAL CANCER    Location: [facility] OR  / SURGERY [facility]    Surgeons: Chriss Soliz M.D.            Relevant Problems   GI   (positive) GERD (gastroesophageal reflux disease)         (positive) Cirrhosis of liver (HCC)      Other   (positive) Anxiety   (positive) Sedative, hypnotic or anxiolytic dependence, continuous       Physical Exam    Airway   Mallampati: II  TM distance: >3 FB  Neck ROM: full       Cardiovascular - normal exam  Rhythm: regular  Rate: normal  (-) murmur     Dental - normal exam           Pulmonary - normal exam  Breath sounds clear to auscultation     Abdominal    Neurological - normal exam                   Anesthesia Plan    ASA 2       Plan - MAC               Induction: intravenous      Pertinent diagnostic labs and testing reviewed    Informed Consent:    Anesthetic plan and risks discussed with patient.    Use of blood products discussed with: patient whom consented to blood products.

## 2024-12-31 NOTE — OR NURSING
B/P trending up.  180's systolic.  Dr Man at bedside to assess and discuss plan of care.  Dr Soliz updated by Dr Man.  [facility]

## 2025-01-01 ENCOUNTER — PHARMACY VISIT (OUTPATIENT)
Dept: PHARMACY | Facility: MEDICAL CENTER | Age: 60
End: 2025-01-01
Payer: COMMERCIAL

## 2025-01-01 VITALS
HEIGHT: 71 IN | DIASTOLIC BLOOD PRESSURE: 86 MMHG | BODY MASS INDEX: 26.54 KG/M2 | WEIGHT: 189.6 LBS | RESPIRATION RATE: 16 BRPM | TEMPERATURE: 98.1 F | OXYGEN SATURATION: 93 % | HEART RATE: 100 BPM | SYSTOLIC BLOOD PRESSURE: 174 MMHG

## 2025-01-01 PROCEDURE — 99239 HOSP IP/OBS DSCHRG MGMT >30: CPT | Performed by: HOSPITALIST

## 2025-01-01 PROCEDURE — RXMED WILLOW AMBULATORY MEDICATION CHARGE: Performed by: HOSPITALIST

## 2025-01-01 PROCEDURE — G0378 HOSPITAL OBSERVATION PER HR: HCPCS

## 2025-01-01 PROCEDURE — 700102 HCHG RX REV CODE 250 W/ 637 OVERRIDE(OP): Performed by: HOSPITALIST

## 2025-01-01 PROCEDURE — 96376 TX/PRO/DX INJ SAME DRUG ADON: CPT

## 2025-01-01 PROCEDURE — 700111 HCHG RX REV CODE 636 W/ 250 OVERRIDE (IP): Mod: JZ | Performed by: NURSE PRACTITIONER

## 2025-01-01 PROCEDURE — A9270 NON-COVERED ITEM OR SERVICE: HCPCS | Performed by: HOSPITALIST

## 2025-01-01 RX ORDER — LOSARTAN POTASSIUM 50 MG/1
25 TABLET ORAL ONCE
Status: COMPLETED | OUTPATIENT
Start: 2025-01-01 | End: 2025-01-01

## 2025-01-01 RX ORDER — LOSARTAN POTASSIUM 50 MG/1
50 TABLET ORAL
Status: DISCONTINUED | OUTPATIENT
Start: 2025-01-02 | End: 2025-01-01 | Stop reason: HOSPADM

## 2025-01-01 RX ORDER — LOSARTAN POTASSIUM 50 MG/1
50 TABLET ORAL DAILY
Qty: 60 TABLET | Refills: 1 | Status: SHIPPED | OUTPATIENT
Start: 2025-01-02

## 2025-01-01 RX ADMIN — ACETAMINOPHEN 1000 MG: 500 TABLET ORAL at 00:38

## 2025-01-01 RX ADMIN — OMEPRAZOLE 20 MG: 20 CAPSULE, DELAYED RELEASE ORAL at 05:24

## 2025-01-01 RX ADMIN — LOSARTAN POTASSIUM 25 MG: 50 TABLET, FILM COATED ORAL at 08:07

## 2025-01-01 RX ADMIN — HYDRALAZINE HYDROCHLORIDE 10 MG: 20 INJECTION, SOLUTION INTRAMUSCULAR; INTRAVENOUS at 08:06

## 2025-01-01 RX ADMIN — LOSARTAN POTASSIUM 25 MG: 50 TABLET, FILM COATED ORAL at 05:24

## 2025-01-01 RX ADMIN — ACETAMINOPHEN 1000 MG: 500 TABLET ORAL at 05:23

## 2025-01-01 ASSESSMENT — PAIN DESCRIPTION - PAIN TYPE
TYPE: ACUTE PAIN

## 2025-01-01 NOTE — OP REPORT
Colonoscopy Report    Date: 12/31/2024    Surgeon: Chriss Soliz    Sedation: monitored anesthesia care provided by     Pre-operative Diagnosis:  Hypertension  Hepatic cirrhosis  Chronic kidney disease  Rectal mass  Post-operative Diagnosis: Same    Procedure:   Flexible sigmoidoscopy with biopsy    Indications: 59-year-old male recently hospitalized for bleeding per rectum and found to have rectal mass with biopsies consistent with adenoma with high-grade dysplasia presents for diagnostic flexible sigmoidoscopy with biopsy.  Imaging confirmed a circumferential distal rectal mass measuring 5 cm with T3 invasion and absent mirna deposits.  Staging CT notable for cirrhotic liver without evidence of metastatic disease.    Findings: Distal rectal mass involving the anal sphincter biopsied    Procedure in detail:   The procedure, indications, preparation and potential complications were explained to the patient, who indicated understanding and signed the corresponding consent forms.  Monitored anesthesia care was provided by anesthesiologist.  Continuous monitoring was used throughout the procedure and supplemental oxygen was administered.The quality of the preparation was fair and likely inadequate to identify polyps measuring less than 5 mm.  The patient was placed in the left lateral decubitus position.  Digital rectal examination demonstrated a firm fixed friable mass circumferential and involving the anal sphincter.  The flexible colonoscope was introduced through the rectum and advanced under direct visualization until the mass was reached.  The mass was biopsied several times.   the colonoscope was not retroflexed within the rectum.  Careful visualization was performed as the instrument was withdrawn.  Patient tolerates to the procedure was good.  The procedure was not difficult.    Chriss Soliz MD PhD  [facility]  Colon and Rectal Surgeon  [phone]

## 2025-01-01 NOTE — DISCHARGE SUMMARY
Discharge Summary    CHIEF COMPLAINT ON ADMISSION  No chief complaint on file.      Reason for Admission  HTN (hypertension), malignant     Admission Date  12/31/2024    CODE STATUS  Full Code    HPI & HOSPITAL COURSE  Martínez Lyons Jr. is a 59 y.o. male who presented 12/31/2024 with past med history of GI bleed.  Repeat workup in the outpatient setting and it is finally identified that patient was having rectal bleed due to rectal carcinoma patient had a flex sig today done by Dr. Soliz surgery to see the status of the rectal cancer.  Postoperatively patient was noted to have marked hypertension.  Hypertension persisted throughout the postoperative period.  Patient states that he does not normally take his blood pressure however and is unclear how his blood pressure is in the outpatient setting.  Patient referred to me for further care and management due to uncontrolled hypertension     Denies any fever chills cough nausea vomiting or sweating    ===============================    Patient was started on losartan 25 minutes p.o. daily we monitored his blood pressure overnight.  He still needed a little bit stronger dosage so we increase Cozaar to 50 mg p.o. daily and patient instructed to check his blood pressure daily in outpatient setting patient instructed to be on 2 g sodium diet.  Patient to exercise as tolerated.  We made a referral to a new PCP for the patient    Therefore, he is discharged in good and stable condition to home with close outpatient follow-up.    The patient recovered much more quickly than anticipated on admission.    Discharge Date  1/1/2025    FOLLOW UP ITEMS POST DISCHARGE      DISCHARGE DIAGNOSES  Principal Problem:    HTN (hypertension), malignant (POA: Yes)  Active Problems:    Cirrhosis of liver (HCC) (POA: Yes)    Rectal mass (POA: Yes)  Resolved Problems:    * No resolved hospital problems. *      FOLLOW UP  No future appointments.  Chriss Soliz M.D.  [address] [facility]  [address]  [place] [address]  [phone]    Follow up        MEDICATIONS ON DISCHARGE     Medication List        START taking these medications        Instructions   losartan 50 MG Tabs  Start taking on: January 2, 2025  Commonly known as: Cozaar   Take 1 Tablet by mouth every day.  Dose: 50 mg            CONTINUE taking these medications        Instructions   pantoprazole 40 MG Tbec  Commonly known as: Protonix   Take 40 mg by mouth every day.  Dose: 40 mg              Allergies  No Known Allergies    DIET  Orders Placed This Encounter   Procedures    Diet Order Diet: Cardiac     Standing Status:   Standing     Number of Occurrences:   1     Order Specific Question:   Diet:     Answer:   Cardiac [6]       ACTIVITY  As tolerated.  Weight bearing as tolerated    CONSULTATIONS  Martínez surgery    PROCEDURES      LABORATORY  Lab Results   Component Value Date    SODIUM 140 12/31/2024    POTASSIUM 3.9 12/31/2024    CHLORIDE 106 12/31/2024    CO2 24 12/31/2024    GLUCOSE 97 12/31/2024    BUN 10 12/31/2024    CREATININE 1.15 12/31/2024    CREATININE 1.4 05/20/2009        Lab Results   Component Value Date    WBC 6.9 12/31/2024    HEMOGLOBIN 13.1 (L) 12/31/2024    HEMATOCRIT 40.8 (L) 12/31/2024    PLATELETCT 244 12/31/2024        Total time of the discharge process exceeds 35 minutes.

## 2025-01-01 NOTE — PROGRESS NOTES
Monitor summary per monitor tech report:    Sinus Rhythm w/1st degree, rate 67-82  Ectopy: rPVCs  .23/.10/.37

## 2025-01-01 NOTE — CARE PLAN
The patient is Stable - Low risk of patient condition declining or worsening    Shift Goals  Clinical Goals: BP control, Establish PCP  Patient Goals: BP control, PCP, GO home  Family Goals: JANETTE    Progress made toward(s) clinical / shift goals:    Problem: Pain - Standard  Goal: Alleviation of pain or a reduction in pain to the patient’s comfort goal  Description: Target End Date:  Prior to discharge or change in level of care    Document on Vitals flowsheet    1.  Document pain using the appropriate pain scale per order or unit policy  2.  Educate and implement non-pharmacologic comfort measures (i.e. relaxation, distraction, massage, cold/heat therapy, etc.)  3.  Pain management medications as ordered  4.  Reassess pain after pain med administration per policy  5.  If opiods administered assess patient's response to pain medication is appropriate per POSS sedation scale  6.  Follow pain management plan developed in collaboration with patient and interdisciplinary team (including palliative care or pain specialists if applicable)  Outcome: Progressing     Problem: Knowledge Deficit - Standard  Goal: Patient and family/care givers will demonstrate understanding of plan of care, disease process/condition, diagnostic tests and medications  Description: Target End Date:  1-3 days or as soon as patient condition allows    Document in Patient Education    1.  Patient and family/caregiver oriented to unit, equipment, visitation policy and means for communicating concern  2.  Complete/review Learning Assessment  3.  Assess knowledge level of disease process/condition, treatment plan, diagnostic tests and medications  4.  Explain disease process/condition, treatment plan, diagnostic tests and medications  Outcome: Progressing       Patient is not progressing towards the following goals:

## 2025-01-01 NOTE — PROGRESS NOTES
Report received from Mandy RN. Assume care. Pt is AAOx4.  Assessment completed. VSS. Denies pain, fully ambulatory, Pt was update for the care for the day. White board updated, All question answered. Pt has call light within reach,  bed is in the lowest position. Pt has no other needs at this time.   0954 BP still elevated after hydralazine and losartan.

## 2025-01-01 NOTE — PROGRESS NOTES
Colorectal surgery progress note  Patient appears well  Blood pressure improving  Hemoglobin stable  Okay to discharge from surgical standpoint  We will provide clinical follow-up and coordination of oncology services  Chriss Soliz MD PhD  [facility]  General Colon and Rectal Surgeon  [phone]

## 2025-01-01 NOTE — PROGRESS NOTES
Bedside shift report received from Sunita BAKER RN. Bed in lowest, locked position with call light and belongings within reach. Fall precautions reviewed with patient. Patient updated on POC.

## 2025-01-01 NOTE — PROGRESS NOTES
D/c instructions given, educated on worsening s/s. Pt understands and questions answered. Dc home with friend

## 2025-01-01 NOTE — PROGRESS NOTES
4 Eyes Skin Assessment Completed by JAVAN Lyles and JAVAN Figueroa.    Head WDL  Ears WDL  Nose WDL  Mouth WDL  Neck WDL  Breast/Chest WDL  Shoulder Blades WDL  Spine WDL  (R) Arm/Elbow/Hand WDL  (L) Arm/Elbow/Hand WDL  Abdomen WDL  Groin WDL  Scrotum/Coccyx/Buttocks pt has sigmoidoscopy 12/31/24, surgifoam in rectum  (R) Leg WDL  (L) Leg WDL  (R) Heel/Foot/Toe WDL  (L) Heel/Foot/Toe WDL          Devices In Places Blood Pressure Cuff and Pulse Ox      Interventions In Place Pillows    Possible Skin Injury No    Pictures Uploaded Into Epic N/A  Wound Consult Placed N/A  RN Wound Prevention Protocol Ordered No

## 2025-01-01 NOTE — PROGRESS NOTES
Report received from Mandy RN. Assume care. Pt is AAOx4.  Assessment completed. VSS. C/o HA-and generalized pain, fully ambulatory, Pt was update for the care for the day. White board updated, All question answered. Pt has call light within reach,  bed is in the lowest position. Pt has no other needs at this time.

## 2025-01-06 ENCOUNTER — TELEPHONE (OUTPATIENT)
Dept: HEALTH INFORMATION MANAGEMENT | Facility: OTHER | Age: 60
End: 2025-01-06
Payer: MEDICARE

## 2025-01-06 ENCOUNTER — TELEPHONE (OUTPATIENT)
Dept: SURGICAL ONCOLOGY | Facility: MEDICAL CENTER | Age: 60
End: 2025-01-06
Payer: MEDICARE

## 2025-01-08 DIAGNOSIS — K62.89 RECTAL MASS: Primary | ICD-10-CM

## 2025-01-10 ENCOUNTER — PATIENT OUTREACH (OUTPATIENT)
Dept: ONCOLOGY | Facility: MEDICAL CENTER | Age: 60
End: 2025-01-10
Payer: MEDICARE

## 2025-01-10 NOTE — LETTER
"   Bandar CHUN [facility]    [address]  [place] [address]  Phone: [phone] - Fax: [phone]              01/10/25      Dear Gage,     It was my pleasure speaking with you today. As your Cancer Nurse Navigator, an certified oncology nurse,  my role is to assess any needs you may have with education, guidance and support. I am available to you and your family through your treatment at [facility].  We have identified some barriers. 1. Transportation from [place]. 2. Lodging when daily Radiation occurs. 3. Family care for your father in your home while you are absent. 4. Financial needs - currently scraping by\".  I will forward you resources and jamey applications to assist. Please complete the applications and return to me for submission.      I am available to address your needs during your journey with the following services:     Care Coordination  I can assist you in facilitating communication between your cancer care treatment team to ensure timely treatment and follow-up.  I can also assist with transition of care back to your primary care provider, or other specialist, as needed.  My goal is to bridge gaps for you throughout the course of your active treatment.       Education Services  Understanding the recommended treatment course by your physician is key. I can provide educational resources personalized to your cancer diagnosis to help you understand your diagnosis and treatment. Please let me know if you would like to receive information about your diagnosis and treatment plan.  I am here to help.     Support Services/Resource Information  [facility] we offer a full scope of support services.  I can assist you with referral information to:  Cancer Clinical Trials & Research  Nutrition counseling  Support groups  Complementary Therapies such as Mind-Body Techniques Meditation  Patient Financial Advocates    [facility], an American Cancer " Society affiliate office, our volunteers can assist you with accessing our [employer] library, support services information, head coverings and comfort items  Community and national resources, included eligibility based jamey assistance and pharmaceutical access programs, if you are in need of additional information.     [facility] offers services that include:  Behavioral Health  Genetic counseling & testing  Acupuncture  Lymphedema prevention/treatment program  Palliative care services.        I hope you have an excellent patient experience.  Please feel free to share with me your comments regarding the care you have received- we value your feedback.    Sincerely,     Amira Meza MSN RN OCN   Cancer Nurse Navigator    Office Mainline: [phone]  Desk voice mail: [phone]  Email: [email or web address]

## 2025-01-10 NOTE — PROGRESS NOTES
Oncology Nurse Navigation Assessment    Call placed to patient, introduced self and reviewed role as nurse navigator.    DX: rectal cancer     POC: Pt will meet with Radiation services, Dr. Andrew and Dr. Nesbitt to discuss treatment plan     FAMHX: Cancer-related family history is not on file.      Patient's goals of care:  Find a way to take care of my 84 year old father and receive treatment          BARRIERS ASSESSMENT:    TRANSPORTATION: Lives in [place], barrier  EMPLOYMENT:  Disabled    FINANCIAL:  Barriers identified   INSURANCE:  Medicare /medicaid   SUPPORT SYSTEM:  S.O. in home, elderly father   PSYCHOLOGICAL: worried, concerned  COMMUNICATION: No barriers identified at this time     FAMILY CARE:  Cares for 84 year old father,   SELF CARE:  No barriers identified          INTERVENTION:  Introduction letter, cancer support services calendar, and message sent to patient by [employer]. Applications for gas due to travel distance,  and lodging during Radiation treatment.

## 2025-01-21 ASSESSMENT — LIFESTYLE VARIABLES
TOBACCO_USE: NO
SMOKING_STATUS: NO

## 2025-01-24 ENCOUNTER — HOSPITAL ENCOUNTER (OUTPATIENT)
Dept: RADIATION ONCOLOGY | Facility: MEDICAL CENTER | Age: 60
End: 2025-01-24
Attending: RADIOLOGY
Payer: MEDICARE

## 2025-01-24 ENCOUNTER — PATIENT OUTREACH (OUTPATIENT)
Dept: ONCOLOGY | Facility: MEDICAL CENTER | Age: 60
End: 2025-01-24

## 2025-01-24 VITALS
WEIGHT: 197.75 LBS | RESPIRATION RATE: 18 BRPM | OXYGEN SATURATION: 98 % | SYSTOLIC BLOOD PRESSURE: 166 MMHG | DIASTOLIC BLOOD PRESSURE: 90 MMHG | HEIGHT: 71 IN | HEART RATE: 97 BPM | TEMPERATURE: 97.6 F | BODY MASS INDEX: 27.69 KG/M2

## 2025-01-24 DIAGNOSIS — C20 RECTAL CANCER (HCC): ICD-10-CM

## 2025-01-24 PROCEDURE — 99205 OFFICE O/P NEW HI 60 MIN: CPT | Performed by: RADIOLOGY

## 2025-01-24 PROCEDURE — 99214 OFFICE O/P EST MOD 30 MIN: CPT | Performed by: RADIOLOGY

## 2025-01-24 RX ORDER — ACETAMINOPHEN 325 MG/1
650 TABLET ORAL EVERY 4 HOURS PRN
COMMUNITY

## 2025-01-24 ASSESSMENT — FIBROSIS 4 INDEX: FIB4 SCORE: 1.13

## 2025-01-24 ASSESSMENT — PAIN SCALES - GENERAL: PAINLEVEL_OUTOF10: NO PAIN

## 2025-01-24 NOTE — PROGRESS NOTES
On January 24, 2025, Oncology Social Worker Sylvie Dubois received assistance request from RN Sveta Gonsalez regarding lodging for pt.  OSW Dubois met with pt. and pt.'s significant other while pt. was meeting with Dr. Andrew.  [facility].  [facility] application was not thoroughly completed.  OSTOMÁS Dubois also spoke to pt. about lodging jamey and options for lodging at the [facility].  OSW Dubois encouraged pt. to complete applications and get them back to OSW Dubois by next Thursday when pt. is back for appointment with Medical Oncology.  Pt. agreed to do so.

## 2025-01-24 NOTE — CT SIMULATION
PATIENT NAME Martínez PATEL Eloy Johnson   PRIMARY PHYSICIAN Pcp Pt States None [id number]   REFERRING PHYSICIAN Chriss Soliz M.D. 1965     Rectal cancer (HCC)  Staging form: Colon and Rectum, AJCC 8th Edition  - Clinical stage from 1/24/2025: Stage IIA (cT3, cN0, cM0) - Signed by Marco Andrew M.D. on 1/24/2025  Total positive nodes: 0  Histologic grade (G): G2  Histologic grading system: 4 grade system         Treatment Planning CT Simulation        Order Questions       Question Answer Comment    Is this for a new course of treatment? Yes     Is this an Addendum? No     Simulation Status Initial     Planned Start Date 2/10/2025     Treatment Site Rectum     Laterality Axial     Treatment Technique IMRT     Other Technique(s)  30 fx    Treatment Pattern/Frequency Daily     Concurrent Chemotherapy Yes xeloda    Ordering Provider MASTER BAKER     CT Technique 3D     Scan Extent Pelvis     Treatment Device(s) Vac Norm     Patient Attire Gown     Patient Position Supine     Chin Position Neutral     Bladder No preference     Treatment Machine No preference     Treatment Image Guidance CBCT     Frequency (CBCT) Daily     Image Guidance Match Bone     Treatment Planning Image Fusion CT/MR     Other Orders Special Tx Procedure      Weekly Physics Check

## 2025-01-24 NOTE — PROGRESS NOTES
"Patient was seen today in clinic with Dr. Andrew for consult.  Vitals signs and weight were obtained and pain assessment was completed.  Allergies and medications were reviewed with the patient.       Vitals/Pain:  Vitals:    01/24/25 1401   BP: (!) 166/90   BP Location: Left arm   Patient Position: Sitting   BP Cuff Size: Adult long   Pulse: 97   Resp: 18   Temp: 36.4 °C (97.6 °F)   SpO2: 98%   Weight: 89.7 kg (197 lb 12 oz)   Height: 1.803 m (5' 11\")   Pain Score: No pain        Allergies:   Patient has no known allergies.    Current Medications:  Current Outpatient Medications   Medication Sig Dispense Refill    acetaminophen (TYLENOL) 325 MG Tab Take 650 mg by mouth every four hours as needed for Mild Pain.      losartan (COZAAR) 50 MG Tab Take 1 Tablet by mouth every day. 60 Tablet 1    pantoprazole (PROTONIX) 40 MG Tablet Delayed Response Take 40 mg by mouth every day.       No current facility-administered medications for this encounter.           Ene Gonsalez R.N.  "

## 2025-01-24 NOTE — CONSULTS
RADIATION ONCOLOGY CONSULT    DATE OF SERVICE: 1/24/2025    IDENTIFICATION: A 59 y.o. male with   Visit Diagnoses     ICD-10-CM   1. Rectal cancer (HCC)  C20     Rectal cancer (HCC)  Staging form: Colon and Rectum, AJCC 8th Edition  - Clinical stage from 1/24/2025: Stage IIA (cT3, cN0, cM0) - Signed by Marco Andrew M.D. on 1/24/2025  Total positive nodes: 0  Histologic grade (G): G2  Histologic grading system: 4 grade system    He is here at the kind request of Dr. Soliz    HISTORY OF PRESENT ILLNESS:   Patient had rectal bleeding and urgency for about a year and underwent colonoscopy on October 29, 2024 showed large fixed viable tumor extending from half of the circumference of centimeters from anal verge biopsy showed high-grade dysplasia.  Patient had MRI rectal protocol December 27, 2024 which shows circumferential approximately low rectal mass abutting the internal anal sphincter T3 N0.  Underwent staging CT cystadenomas which showed no distant metastatic disease however cirrhotic liver.  Patient flexible sigmoidoscopy December 31, 2024 which showed invasive moderately differentiated adenocarcinoma MMR intact x 4.  CEA 12/31/2024 2.2.    PAST MEDICAL HISTORY:  Past Medical History:   Diagnosis Date    Acute renal failure (HCC)     Hepatic cirrhosis (HCC)     liver failure 2009;      Hyperlipidemia     Hypertension     Psychiatric disorder     ETOH and drug addiction       PAST SURGICAL HISTORY:  Past Surgical History:   Procedure Laterality Date    TX SIGMOIDOSCOPY,DIAGNOSTIC  12/31/2024    Procedure: FLEXIBLE SIGMOIDOSCOPY WITH BIOPSY;  Surgeon: Chriss Soliz M.D.;  Location: SURGERY [facility];  Service: Gastroenterology    HERNIA REPAIR      inguinal - right 1993    LAMINOTOMY      microdiscetomy multiple levels - L3-S1 - in [place] - 2001    OTHER ABDOMINAL SURGERY      inguinal hernia repair    OTHER ORTHOPEDIC SURGERY      rt elbow repair       CURRENT MEDICATIONS:  Current Outpatient  "Medications   Medication Sig Dispense Refill    acetaminophen (TYLENOL) 325 MG Tab Take 650 mg by mouth every four hours as needed for Mild Pain.      losartan (COZAAR) 50 MG Tab Take 1 Tablet by mouth every day. 60 Tablet 1    pantoprazole (PROTONIX) 40 MG Tablet Delayed Response Take 40 mg by mouth every day.       No current facility-administered medications for this encounter.       ALLERGIES:    Patient has no known allergies.    FAMILY HISTORY:    family history includes Heart Disease in his father; Kidney cancer in his father.    SOCIAL HISTORY:     reports that he quit smoking about 5 years ago. His smoking use included cigarettes. He started smoking about 33 years ago. He has a 28 pack-year smoking history. He does not have any smokeless tobacco history on file. He reports that he does not currently use drugs. He reports that he does not drink alcohol. He states that he lives in [place] with his significant other Tabatha and his father. 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     REVIEW OF SYSTEMS:  A review of systems for today's date of service was reviewed and uploaded into the electronic medical record.      PHYSICAL EXAM:    ECOG PERFORMANCE STATUS:      1/24/2025     2:08 PM   ECOG Performance Review   ECOG Performance Status Fully active, able to carry on all pre-disease performance without restriction         1/24/2025     2:07 PM   Karnofsky Score   Karnofsky Score 100     BP (!) 166/90 (BP Location: Left arm, Patient Position: Sitting, BP Cuff Size: Adult long)   Pulse 97   Temp 36.4 °C (97.6 °F)   Resp 18   Ht 1.803 m (5' 11\")   Wt 89.7 kg (197 lb 12 oz)   SpO2 98%   BMI 27.58 kg/m²   Physical Exam  Vitals reviewed.   Eyes:      Pupils: Pupils are equal, round, and reactive to light.   Cardiovascular:      Rate and Rhythm: Normal rate.   Pulmonary:      Effort: Pulmonary effort is normal.   Abdominal:      General: Abdomen is flat.   Musculoskeletal:         General: " Normal range of motion.   Neurological:      General: No focal deficit present.      Mental Status: He is alert.   Psychiatric:         Mood and Affect: Mood normal.          LABORATORY DATA:   Lab Results   Component Value Date/Time    WBC 9.1 01/17/2025 1037    WBC 6.9 12/31/2024 0800    WBC 10.0 08/28/2014 1614    HEMOGLOBIN 14 01/17/2025 1037    HEMOGLOBIN 13.1 (L) 12/31/2024 0800    HEMOGLOBIN 16.4 08/28/2014 1614    HEMATOCRIT 43.2 01/17/2025 1037    HEMATOCRIT 40.8 (L) 12/31/2024 0800    HEMATOCRIT 46.7 08/28/2014 1614    MCV 86 01/17/2025 1037    MCV 85.0 12/31/2024 0800    MCV 90.5 08/28/2014 1614    PLATELETCT 275 01/17/2025 1037    PLATELETCT 244 12/31/2024 0800    PLATELETCT 195 08/28/2014 1614    NEUTS 5.8 01/17/2025 1037    NEUTS 6.30 08/28/2014 1614      Lab Results   Component Value Date/Time    SODIUM 140 12/31/2024 0800    SODIUM 138 03/25/2016 1023    SODIUM 136 08/28/2014 1614    POTASSIUM 3.9 12/31/2024 0800    POTASSIUM 4.0 03/25/2016 1023    POTASSIUM 3.9 08/28/2014 1614    BUN 10 12/31/2024 0800    BUN 14 03/25/2016 1023    BUN 11 08/28/2014 1614    CREATININE 1.15 12/31/2024 0800    CREATININE 1.23 03/25/2016 1023    CREATININE 1.08 08/28/2014 1614    CALCIUM 9.6 12/31/2024 0800    CALCIUM 9.5 03/25/2016 1023    CALCIUM 9.9 08/28/2014 1614    ALBUMIN 4.4 12/31/2024 0800    ALBUMIN 4.4 03/25/2016 1023    ALBUMIN 4.6 08/28/2014 1614    ASTSGOT 19 12/31/2024 0800    ASTSGOT 21 03/25/2016 1023    ASTSGOT 44 08/28/2014 1614    ALKPHOSPHAT 79 12/31/2024 0800    ALKPHOSPHAT 67 03/25/2016 1023    ALKPHOSPHAT 95 08/28/2014 1614    IFNOTAFR >60 03/25/2016 1023    IFNOTAFR >60 08/28/2014 1614    IFNOTAFR >60 11/30/2011 1027       RADIOLOGY DATA:  CT-CHEST,ABDOMEN,PELVIS WITH    Result Date: 12/28/2024 12/27/2024 3:14 PM HISTORY/REASON FOR EXAM:  Staging CT for any diagnosis of cancer please evaluate for metastatic disease. TECHNIQUE/EXAM DESCRIPTION: CT scan of the chest, abdomen and pelvis with  contrast. Thin-section helical scanning was obtained with intravenous contrast from the lung apices through the pubic symphysis to include the chest, abdomen and pelvis. 100 mL of Omnipaque 350 nonionic contrast was administered intravenously without complication. Low dose optimization technique was utilized for this CT exam including automated exposure control and adjustment of the mA and/or kV according to patient size. COMPARISON: MR 12/27/2024. FINDINGS: CHEST: Lungs: No airspace opacity. No suspicious pulmonary nodule. Airway: Patent Pleura: No pleural effusion or pneumothorax.. Mediastinum/Lilian: No significant adenopathy. Heart and pericardium:  No pericardial effusion. Thoracic aorta and great vessels:  No aneurysm. Pulmonary arteries: No central pulmonary embolus. Lymph nodes: No enlarged mediastinal or hilar lymph nodes. Thoracic spine: No aggressive osseous lesion Chest wall:   Unremarkable. ABDOMEN AND PELVIS: Liver: Cirrhotic appearing liver. Spleen: Unremarkable. Pancreas: Unremarkable. Gallbladder: Layering calcified stones. Biliary: There is no biliary dilatation. Adrenal glands: Normal. Kidneys: No hydronephrosis. Bilateral kidneys are enhancing symmetrically.. Bowel: There is no bowel wall thickening or abnormal dilatation. An irregular wall thickening of the rectum Lymph nodes: No adenopathy. Vasculature: The abdominal aorta is normal in caliber. Moderate atherosclerotic disease of the abdominal aorta and its branches. Peritoneum: Unremarkable without ascites. Musculoskeletal: No aggressive osseous lesion Pelvis: No obvious abnormality seen in the pelvic organs but evaluation limited on CT.     1. No CT evidence of metastatic disease in the chest, abdomen or pelvis. 2. Irregular wall thickening of the rectal could relate to known rectal cancer. 3. Cirrhotic appearing liver. 4. Cholelithiasis.    MR-RECTAL/PROSTATE PROTOCOL    Result Date: 12/27/2024 12/27/2024 1:03 PM HISTORY/REASON FOR EXAM:  New  diagnosis of rectal cancer. TECHNIQUE/EXAM DESCRIPTION: MRI of the pelvis without and with contrast. The study was performed on a Malena 3.0 Iv[employer] MRI scanner. Sagittal and axial T2; oblique high resolution axial and coronal T2; oblique axial diffusion-weighted imaging and ADC map with B values of 100, 400, and 1000; oblique axial pre- and post contrast images; axial post contrast T1-fat-sat of the full pelvis. 1 mg of glucagon was given intravenously at the start of the exam. 20 mL ProHance contrast was administered intravenously. COMPARISON: None. FINDINGS: RECTAL TUMOR: Morphologic description: Annular. Distance of the inferior tumor margin to the anal verge: 4.5cm. Distance of the inferior tumor margin to the pelvic floor: 1.0cm. Superior to inferior extension: 5.5 cm. Circumferential extension: Circumferential. Maximum depth of extramural spread beyond the muscularis propria: 6mm. Tumor border: Infiltrative Minimum distance from the tumor margin to mesorectal fascia: 2mm. Extramural venous invasion: No tumor signal in vessels. CRM status: Distance to be mesorectal fascia greater than 1 mm, potential CRM clear. Mass abuts the internal anal sphincter. T STAGE: T3b: Tumor extends 5-10 mm into the perirectal fat ISAIAS SPREAD: Minimally prominent LEFT posterior perirectal lymph node.  Several smaller lymph nodes present. Homogeneous signal intensity lymph nodes with smooth borders, N0 ADDITIONAL PELVIC NODES: None BONES: No abnormal within the visualized marrow space.  Synovial enhancement about the RIGHT hip suggesting inflammation. Ovoid cystic structure within the RIGHT gluteal subcutaneous soft tissues measuring approximately 6.5 cm, likely sebaceous cyst.     Circumferential 5 cm low rectal mass, abutting the internal anal sphincter. T3e N0       IMPRESSION:    A 59 y.o. with  Visit Diagnoses     ICD-10-CM   1. Rectal cancer (HCC)  C20     Rectal cancer (HCC)  Staging form: Colon and Rectum, AJCC 8th  Edition  - Clinical stage from 1/24/2025: Stage IIA (cT3, cN0, cM0) - Signed by Marco Andrew M.D. on 1/24/2025  Total positive nodes: 0  Histologic grade (G): G2  Histologic grading system: 4 grade system        RECOMMENDATIONS:   I discussed the role for neoadjuvant treatment for the treatment of low-lying rectal cancer.  The goal of treatment would be prevent recurrence, decrease the chance of needing a temporary or permanent colostomy, and ultimately delay or prevent death from rectal cancer.  Radiation therapy was discussed including high dose conformal external beam using IMRT techniques.       Risks and side effects related to the radiation therapy include those which are:  Likely   Tanning, redness, or darkening of skin in treatment area; including substantial robb-anal desquamation causing pain which can become severe enough to require a break in treatment.  Rash, itching or peeling of skin   Temporary hair loss in the treatment area   Temporary fatigue   Abdominal cramps   Frequent bowel movements, sometime with urgency, or diarrhea  Rectal cramps and irritation with pain on defecation   Mild rectal bleeding that does not require treatment  Bladder irritation with a stinging sensation   Frequency or urgency of urination   Small amounts of blood in the urine   In females, vaginal stenosis causing inability to have sexual activity or long term stenosis.    Less Likely    Urinary obstruction requiring the placement of a temporary urinary catheter and/or dilatation because of stricture (narrowing)  Less ability to control urine (increased incontinence)  Inability to achieve an erection (inability of the penis to become hard) in males  Rectal bleeding that requires medication or laser treatment to stop    Rare but serious   Injury to the bladder, urethra, bowel, or other tissues in the pelvis or abdomen requiring additional procedure or surgery, such as a colostomy (bag for stool).  Intestinal obstruction  requiring surgery    We will set the patient up with CT simulation with contrast next week and plan to start 7 to 10 days later.  We will plan on 6 weeks of treatment with concurrent chemotherapy.    Patient seeing Dr. Nesbitt next week and lodging being provided by .    Thank you for the opportunity to participate in his care.  If any questions or comments, please do not hesitate in calling.    Orders Placed This Encounter    Rad Onc Treatment Planning CT Simulation    Referral to Oncology Psychosocial Screening for Distress    acetaminophen (TYLENOL) 325 MG Tab

## 2025-01-30 ENCOUNTER — HOSPITAL ENCOUNTER (OUTPATIENT)
Dept: HEMATOLOGY ONCOLOGY | Facility: MEDICAL CENTER | Age: 60
End: 2025-01-30
Attending: STUDENT IN AN ORGANIZED HEALTH CARE EDUCATION/TRAINING PROGRAM
Payer: MEDICARE

## 2025-01-30 ENCOUNTER — HOSPITAL ENCOUNTER (OUTPATIENT)
Dept: RADIATION ONCOLOGY | Facility: MEDICAL CENTER | Age: 60
End: 2025-01-30

## 2025-01-30 VITALS
OXYGEN SATURATION: 97 % | TEMPERATURE: 96.8 F | SYSTOLIC BLOOD PRESSURE: 118 MMHG | HEIGHT: 71 IN | BODY MASS INDEX: 27.75 KG/M2 | HEART RATE: 77 BPM | WEIGHT: 198.2 LBS | DIASTOLIC BLOOD PRESSURE: 62 MMHG

## 2025-01-30 DIAGNOSIS — C20 RECTAL CANCER (HCC): ICD-10-CM

## 2025-01-30 PROCEDURE — 77470 SPECIAL RADIATION TREATMENT: CPT | Performed by: RADIOLOGY

## 2025-01-30 PROCEDURE — 77290 THER RAD SIMULAJ FIELD CPLX: CPT | Performed by: RADIOLOGY

## 2025-01-30 PROCEDURE — 99212 OFFICE O/P EST SF 10 MIN: CPT | Performed by: STUDENT IN AN ORGANIZED HEALTH CARE EDUCATION/TRAINING PROGRAM

## 2025-01-30 PROCEDURE — 77334 RADIATION TREATMENT AID(S): CPT | Performed by: RADIOLOGY

## 2025-01-30 ASSESSMENT — ENCOUNTER SYMPTOMS
DIAPHORESIS: 0
DOUBLE VISION: 0
FEVER: 0
WEAKNESS: 0
HEARTBURN: 0
PALPITATIONS: 0
BLOOD IN STOOL: 1
WHEEZING: 0
SHORTNESS OF BREATH: 0
NAUSEA: 0
DIARRHEA: 0
CONSTIPATION: 0
BRUISES/BLEEDS EASILY: 0
SINUS PAIN: 0
HEADACHES: 0
CHILLS: 0
NERVOUS/ANXIOUS: 0
BLURRED VISION: 0
INSOMNIA: 0
WEIGHT LOSS: 0
DIZZINESS: 0
VOMITING: 0
ABDOMINAL PAIN: 0
MYALGIAS: 0
SPUTUM PRODUCTION: 0
BACK PAIN: 0
SORE THROAT: 0
TINGLING: 0
COUGH: 0
ORTHOPNEA: 0

## 2025-01-30 ASSESSMENT — PAIN SCALES - GENERAL: PAINLEVEL_OUTOF10: NO PAIN

## 2025-01-30 ASSESSMENT — FIBROSIS 4 INDEX: FIB4 SCORE: 1.13

## 2025-01-30 NOTE — Clinical Note
Hey so this cirrhosis doesn't seem to have any sense of decompensation, we could still screen him for JANUS, what do you all think?

## 2025-01-30 NOTE — PROGRESS NOTES
Consult:  Hematology/Oncology      Referring Physician: Chriss Soliz MD  Primary Care:  Pcp Pt States None    Diagnosis: Rectal adenocarcinoma    Chief Complaint:  Evaluation for systemic therapy    History of Presenting Illness:  Martínez Lyons Jr. is a 59 y.o.  man who presents to the clinic for evaluation for systemic therapy for a new diagnosis of rectal adenocarcinoma. He had been having blood in his stool with irregular bowel habits for 1 year, and finally went to be evaluated. He had never had a colonoscopy before now. His scope revealed a mass in the rectum, and subsequent MRI rectal protocol revealed a cT3N0 disease. Flexible sigmoidoscopy with repeat biopsy revealed invasive moderately differentiated adenocarcinoma, pMMR. He was referred for evaluation accordingly.     Interval History:  Patient is here for consultation. He is doing okay for the most part. He is wondering about next steps. He is ready to start radiation on 02/10/25.     Past Medical History:   Diagnosis Date    Acute renal failure (HCC)     Cirrhosis, alcoholic (HCC)     Hepatic cirrhosis (HCC)     liver failure 2009;      Hyperlipidemia     Hypertension     Psychiatric disorder     ETOH and drug addiction    Rectal cancer (HCC)        Past Surgical History:   Procedure Laterality Date    OR SIGMOIDOSCOPY,DIAGNOSTIC  12/31/2024    Procedure: FLEXIBLE SIGMOIDOSCOPY WITH BIOPSY;  Surgeon: Chriss Soliz M.D.;  Location: SURGERY [facility];  Service: Gastroenterology    HERNIA REPAIR      inguinal - right 1993    LAMINOTOMY      microdiscetomy multiple levels - L3-S1 - in [place] - 2001    OTHER ABDOMINAL SURGERY      inguinal hernia repair    OTHER ORTHOPEDIC SURGERY      rt elbow repair       Social History     Socioeconomic History    Marital status: Other     Spouse name: Not on file    Number of children: Not on file    Years of education: Not on file    Highest education level: Not on file   Occupational History     Not on file   Tobacco Use    Smoking status: Former     Current packs/day: 0.00     Average packs/day: 1 pack/day for 28.0 years (28.0 ttl pk-yrs)     Types: Cigarettes     Start date: 1992     Quit date: 2020     Years since quittin.0    Smokeless tobacco: Not on file   Vaping Use    Vaping status: Every Day    Substances: Nicotine    Devices: Pre-filled or refillable cartridge   Substance and Sexual Activity    Alcohol use: No     Comment: sober for 6 YRS; hx alcoholism [id number]    Drug use: Not Currently     Comment: quit 21 months ago. hx heroin    Sexual activity: Not on file     Comment: engaged; one child; disabled   Other Topics Concern    Not on file   Social History Narrative    Lives in [place] with SO. Dad lives with him and he is his caretaker     Social Drivers of Health     Financial Resource Strain: Not on file   Food Insecurity: No Food Insecurity (2024)    Hunger Vital Sign     Worried About Running Out of Food in the Last Year: Never true     Ran Out of Food in the Last Year: Never true   Transportation Needs: No Transportation Needs (2024)    PRAPARE - Transportation     Lack of Transportation (Medical): No     Lack of Transportation (Non-Medical): No   Physical Activity: Not on file   Stress: Not on file   Social Connections: Not on file   Intimate Partner Violence: Not At Risk (2024)    Humiliation, Afraid, Rape, and Kick questionnaire     Fear of Current or Ex-Partner: No     Emotionally Abused: No     Physically Abused: No     Sexually Abused: No   Housing Stability: Low Risk  (2024)    Housing Stability Vital Sign     Unable to Pay for Housing in the Last Year: No     Number of Times Moved in the Last Year: 0     Homeless in the Last Year: No       Family History   Problem Relation Age of Onset    Cancer Father         Kidney cancer    Heart Disease Father     Kidney cancer Father     Cancer Paternal Grandfather        OB History   No obstetric history on  "file.       Allergies as of 01/30/2025    (No Known Allergies)         Current Outpatient Medications:     acetaminophen (TYLENOL) 325 MG Tab, Take 650 mg by mouth every four hours as needed for Mild Pain., Disp: , Rfl:     losartan (COZAAR) 50 MG Tab, Take 1 Tablet by mouth every day., Disp: 60 Tablet, Rfl: 1    pantoprazole (PROTONIX) 40 MG Tablet Delayed Response, Take 40 mg by mouth every day., Disp: , Rfl:     Review of Systems:  Review of Systems   Constitutional:  Negative for chills, diaphoresis, fever, malaise/fatigue and weight loss.   HENT:  Negative for hearing loss, nosebleeds, sinus pain and sore throat.    Eyes:  Negative for blurred vision and double vision.   Respiratory:  Negative for cough, sputum production, shortness of breath and wheezing.    Cardiovascular:  Negative for chest pain, palpitations, orthopnea and leg swelling.   Gastrointestinal:  Positive for blood in stool. Negative for abdominal pain, constipation, diarrhea, heartburn, melena, nausea and vomiting.   Genitourinary:  Negative for dysuria, frequency, hematuria and urgency.   Musculoskeletal:  Negative for back pain, joint pain and myalgias.   Skin:  Negative for rash.   Neurological:  Negative for dizziness, tingling, weakness and headaches.   Endo/Heme/Allergies:  Does not bruise/bleed easily.   Psychiatric/Behavioral:  The patient is not nervous/anxious and does not have insomnia.           Physical Exam:  Vitals:    01/30/25 1112   BP: 118/62   Pulse: 77   Temp: 36 °C (96.8 °F)   TempSrc: Temporal   SpO2: 97%   Weight: 89.9 kg (198 lb 3.2 oz)   Height: 1.803 m (5' 10.98\")       DESC; KARNOFSKY SCALE WITH ECOG EQUIVALENT: 100, Fully active, able to carry on all pre-disease performed without restriction (ECOG equivalent 0)    DISTRESS LEVEL: no apparent distress    Physical Exam  Vitals and nursing note reviewed.   Constitutional:       General: He is awake. He is not in acute distress.     Appearance: Normal appearance. He is " normal weight. He is not ill-appearing, toxic-appearing or diaphoretic.   HENT:      Head: Normocephalic and atraumatic.      Nose: Nose normal. No congestion.      Mouth/Throat:      Pharynx: Oropharynx is clear. No oropharyngeal exudate or posterior oropharyngeal erythema.   Eyes:      General: No scleral icterus.     Extraocular Movements: Extraocular movements intact.      Conjunctiva/sclera: Conjunctivae normal.      Pupils: Pupils are equal, round, and reactive to light.   Cardiovascular:      Rate and Rhythm: Normal rate and regular rhythm.      Pulses: Normal pulses.      Heart sounds: Normal heart sounds. No murmur heard.     No friction rub. No gallop.   Pulmonary:      Effort: Pulmonary effort is normal.      Breath sounds: Normal breath sounds. No decreased air movement. No wheezing, rhonchi or rales.   Abdominal:      General: Bowel sounds are normal. There is no distension.      Tenderness: There is no abdominal tenderness.   Musculoskeletal:         General: No deformity. Normal range of motion.      Cervical back: Normal range of motion and neck supple. No tenderness.      Right lower leg: No edema.      Left lower leg: No edema.   Lymphadenopathy:      Cervical: No cervical adenopathy.      Upper Body:      Right upper body: No axillary adenopathy.      Left upper body: No axillary adenopathy.      Lower Body: No right inguinal adenopathy. No left inguinal adenopathy.   Skin:     General: Skin is warm and dry.      Coloration: Skin is not jaundiced.      Findings: No erythema or rash.   Neurological:      General: No focal deficit present.      Mental Status: He is alert and oriented to person, place, and time.      Sensory: Sensation is intact.      Motor: Motor function is intact. No weakness.      Gait: Gait is intact.   Psychiatric:         Attention and Perception: Attention normal.         Mood and Affect: Mood normal.         Behavior: Behavior normal. Behavior is cooperative.         Thought  Content: Thought content normal.         Judgment: Judgment normal.          Depression Screening    Little interest or pleasure in doing things?      Feeling down, depressed , or hopeless?     Trouble falling or staying asleep, or sleeping too much?      Feeling tired or having little energy?      Poor appetite or overeating?      Feeling bad about yourself - or that you are a failure or have let yourself or your family down?     Trouble concentrating on things, such as reading the newspaper or watching television?     Moving or speaking so slowly that other people could have noticed.  Or the opposite - being so fidgety or restless that you have been moving around a lot more than usual?      Thoughts that you would be better off dead, or of hurting yourself?      Patient Health Questionnaire Score:         If depressive symptoms identified deferred to follow up visit unless specifically addressed in assesment and plan.    Interpretation of PHQ-9 Total Score   Score Severity   1-4 No Depression   5-9 Mild Depression   10-14 Moderate Depression   15-19 Moderately Severe Depression   20-27 Severe Depression    Labs:  No visits with results within 7 Day(s) from this visit.   Latest known visit with results is:   Admission on 12/31/2024, Discharged on 01/01/2025   Component Date Value Ref Range Status    PT 12/31/2024 13.8  12.0 - 14.6 sec Final    INR 12/31/2024 1.06  0.87 - 1.13 Final    Comment: INR - Non-therapeutic Reference Range: 0.87-1.13  INR - Therapeutic Reference Range: 2.0-4.0      WBC 12/31/2024 6.9  4.8 - 10.8 K/uL Final    RBC 12/31/2024 4.80  4.70 - 6.10 M/uL Final    Hemoglobin 12/31/2024 13.1 (L)  14.0 - 18.0 g/dL Final    Hematocrit 12/31/2024 40.8 (L)  42.0 - 52.0 % Final    MCV 12/31/2024 85.0  81.4 - 97.8 fL Final    MCH 12/31/2024 27.3  27.0 - 33.0 pg Final    MCHC 12/31/2024 32.1 (L)  32.3 - 36.5 g/dL Final    RDW 12/31/2024 41.3  35.9 - 50.0 fL Final    Platelet Count 12/31/2024 144 822 - 542  K/uL Final    MPV 12/31/2024 9.0  9.0 - 12.9 fL Final    Sodium 12/31/2024 140  135 - 145 mmol/L Final    Potassium 12/31/2024 3.9  3.6 - 5.5 mmol/L Final    Chloride 12/31/2024 106  96 - 112 mmol/L Final    Co2 12/31/2024 24  20 - 33 mmol/L Final    Anion Gap 12/31/2024 10.0  7.0 - 16.0 Final    Glucose 12/31/2024 97  65 - 99 mg/dL Final    Bun 12/31/2024 10  8 - 22 mg/dL Final    Creatinine 12/31/2024 1.15  0.50 - 1.40 mg/dL Final    Calcium 12/31/2024 9.6  8.5 - 10.5 mg/dL Final    Correct Calcium 12/31/2024 9.3  8.5 - 10.5 mg/dL Final    AST(SGOT) 12/31/2024 19  12 - 45 U/L Final    ALT(SGPT) 12/31/2024 13  2 - 50 U/L Final    Alkaline Phosphatase 12/31/2024 79  30 - 99 U/L Final    Total Bilirubin 12/31/2024 0.6  0.1 - 1.5 mg/dL Final    Albumin 12/31/2024 4.4  3.2 - 4.9 g/dL Final    Total Protein 12/31/2024 7.2  6.0 - 8.2 g/dL Final    Globulin 12/31/2024 2.8  1.9 - 3.5 g/dL Final    A-G Ratio 12/31/2024 1.6  g/dL Final    GFR (CKD-EPI) 12/31/2024 73  >60 mL/min/1.73 m 2 Final    Comment: Estimated Glomerular Filtration Rate is calculated using  race neutral CKD-EPI 2021 equation per NKF-ASN recommendations.      Pathology Request 12/31/2024 Sent to Histo   Final    Carcinoembryonic Antigen 12/31/2024 2.2  0.0 - 3.0 ng/mL Final    Comment: Performed using Roche chico e immunoassay analyzer. CEA values determined on  patient samples by different testing procedures cannot be directly compared  with one another and could be the cause of erroneous medical  interpretations. If there is a change in the CEA assay procedure used while  monitoring therapy, then new baselines may need to be established when  comparing previous results.         Imaging:   All listed images below have been independently reviewed by me. I agree with the findings as summarized below:    No results found.     Pathology:    FINAL DIAGNOSIS:     A. Rectal mass, biopsy:          Invasive moderately-differentiated adenocarcinoma (see comment).             Tumor cells demonstrate intact nuclear staining for MLH1, MSH2,           MSH6, and PMS2 by immunostains (MMR proficient).     Comment: Part B of this case has been reviewed by a second pathologist,   Dr. Flores, who concurs with the diagnosis of malignancy                                         Diagnosis performed by:                                       BOB CAMPBELL MD     Assessment & Plan:  1. Rectal cancer (HCC)            This is a 59 year old  man with rectal adenocarcinoma, [id number] stage IIA disease, pMMR. He presents for evaluation.     Current Diagnosis and Staging: Rectal adenocarcinoma, [id number] stage IIA disease, pMMR    Treatment Plan: Capecitabine with concurrent XRT, followed by FOLFOX chemotherapy (or potentially mFOLFIRINOX if patient wants to pursue JANUS protocol), and then surgical evaluation    Treatment Citation: NCCN    Plan of Care:    Primary Therapy: Capecitabine with concurrent XRT to start 02/10/25  Supportive Therapy: Antiemetics per protocol  Toxicity: Patient is getting antineoplastic therapy and needs monitoring of blood counts, hepatic function, and renal function due to potential for organ dysfunction.   Labs: CBC with diff, CMP, CEA monitoring. ctDNA monitoring  Imaging: Repeat MRI rectal protocol and CT c/a/p after completion of therapy  Treatment Planning: Patient will proceed with chemoRT utilizing capecitabine, followed by FOLFOX (or mFOLFIRINOX if patient opts for JANUS trial and is randomized accordingly), and then surgical evaluation.   Consultations: Colorectal surgery (Dr. Soliz); Radiation oncology (Dr. Andrew)  Code Status: Full  Miscellaneous: NA  Return for Follow Up: For start of therapy    Any questions and concerns raised by the patient were answered to the best of my ability. Thank you for allowing me to participate in the care for this patient. Please feel free to contact me for any questions or concerns.     Total time spent on  chart review, clinic encounter, and documentation: 62 minutes.

## 2025-01-31 NOTE — RADIATION COMPLETION NOTES
Clinical Treatment Planning Note    DATE OF SERVICE: 1/30/2025    DIAGNOSIS:  Rectal cancer (HCC)  Staging form: Colon and Rectum, AJCC 8th Edition  - Clinical stage from 1/24/2025: Stage IIA (cT3, cN0, cM0) - Signed by Marco Andrew M.D. on 1/24/2025  Total positive nodes: 0  Histologic grade (G): G2  Histologic grading system: 4 grade system         IMAGING REVIEWED:  [x] CT     [] MRI     [] PET/CT     [] BONE SCAN     [] MAMMO     [] OTHER      TREATMENT INTENT:   [x] CURATIVE     [] MAINTENANCE     []  PALLIATIVE      []  SUPPORTIVE     []  PROPHYLACTIC     [] BENIGN     []  CONSOLIDATIVE      [] DEFINITIVE   []  OLOGIMETASTATIC      LINE OF TREATMENT:  [] ADJUVANT   [x] DEFINITIVE   [] NEOADJUVANT   [] RE-TREATMENT      TECHNIQUE PLANNED:  [x] IMRT   [] 3D   [] SBRT   [] SRS/SRT   [] HDR   [] ELECTRON       IMRT JUSTIFICATION:  []   An immediately adjacent area has been previously irradiated and abutting portals must be established with high precision.    []  Dose escalation is planned to deliver radiation doses in excess of those commonly utilized for similar tumors with conventional treatment.    []  The target volume is concave or convex, and the critical normal tissues are within or around that convexity or concavity.    []  The target volume is in close proximity to critical structures that must be protected.    []  The volume of interest must be covered with narrow margins to adequately protect  immediately adjacent structures.      FIELDS & BLOCKING:  [x] COMPLEX BLOCKS     []  = 3 TX AREAS     [x]  ARCS     []  CUSTOM SHEILD        []  SIMPLE BLOCK      CHEMOTHERAPY:  [x]  CONCURRENT     []  INDUCTION     [] SEQUENTIAL     []  <30 DAYS FROM XRT      NOTES:  OAR CONSTRAINTS: (GUIDELINES ONLY NOT ABSOLUTE)   Target Prescribed Coverage   PTV 95% of PTV covered by 100% (cGy) of RX Dose     PTV 99% of PTV covered by 93% (cGy) of RX Dose       DENILSON Goal   R Femoral Head Max Dose < 50Gy   L Femoral Head Max  Dose < 50Gy   Individual Small Bowel Loops V15Gy < 120cc   Entire Cavity Small Bowel V45Gy < 195cc   Bladder V40Gy < 40%   Bladder V45Gy < 15%   *RTOG 0822, QUANTEC

## 2025-01-31 NOTE — RADIATION COMPLETION NOTES
PATIENT NAME Martínez Lyons Jr.   PRIMARY PHYSICIAN Pcp Pt States None [phone]   REFERRING PHYSICIAN No ref. provider found 1965       DATE OF SERVICE: 1/30/2025    DIAGNOSIS:  Rectal cancer (HCC)  Staging form: Colon and Rectum, AJCC 8th Edition  - Clinical stage from 1/24/2025: Stage IIA (cT3, cN0, cM0) - Signed by Marco Andrew M.D. on 1/24/2025  Total positive nodes: 0  Histologic grade (G): G2  Histologic grading system: 4 grade system       SPECIAL TREATMENT PROCEDURE NOTE:  Considerable additional effort required in the management of this case because of administration of concurrent xeloda chemotherapy which may result in increased normal tissue toxicity. This includes longer and possibly more frequent on treatment visits.

## 2025-01-31 NOTE — RADIATION COMPLETION NOTES
DATE OF SERVICE: 1/30/2025    DIAGNOSIS:  Rectal cancer (HCC)  Staging form: Colon and Rectum, AJCC 8th Edition  - Clinical stage from 1/24/2025: Stage IIA (cT3, cN0, cM0) - Signed by Marco Andrew M.D. on 1/24/2025  Total positive nodes: 0  Histologic grade (G): G2  Histologic grading system: 4 grade system       DATE OF SERVICE: 1/30/2025    TYPE OF SIMULATION: Pelvis    GOAL OF TREATMENT:   [x] Curative  [] Palliative  [] Oligometastatic    CONTRAST:    [] IV Contrast*  [] Small Bowel  [] Rectal  [] Urethral              POSITION:    [x]  Supine  [] Prone with belly board    COMPLEX:  [x] Complex Blocking   [x]Arcs  [] Custom Blocks  [] >3 Sites    PROCEDURE: Patient positioned on CT table in Vac-Norm immobilization device with or without belly board depending on position. CT acquired thorough the entire volume of interest.  Images reviewed and exported to treatment planning system.    I have personally reviewed the relevant data, performed the target localization, and determined all relevant factors for this patient’s simulation.    *Omnipaque 80 -100cc IVP in conjunction with 500cc NS

## 2025-02-03 DIAGNOSIS — C20 RECTAL CANCER (HCC): ICD-10-CM

## 2025-02-03 RX ORDER — CAPECITABINE 500 MG/1
TABLET, FILM COATED ORAL
Qty: 180 TABLET | Refills: 0 | Status: SHIPPED | OUTPATIENT
Start: 2025-02-03

## 2025-02-03 RX ORDER — CAPECITABINE 150 MG/1
TABLET, FILM COATED ORAL
Qty: 60 TABLET | Refills: 0 | Status: SHIPPED | OUTPATIENT
Start: 2025-02-03

## 2025-02-03 NOTE — PROGRESS NOTES
Chemotherapy Verification - PRIMARY RN      Height = 1.803m  Weight = 89.9 kg  BSA = 2.12m2       Medication: Capecitabine (Xeloda)  Dose: 825 mg/m2 PO BID with radiation x 30 fractions  Calculated Dose: 1,749mg- Per Dr. Nesbitt round down to 1650mg   (In mg/m2, AUC, mg/kg)     I confirm this process was performed independently with the BSA and all final chemotherapy dosing calculations congruent.  Any discrepancies of 10% or greater have been addressed with the chemotherapy pharmacist. The resolution of the discrepancy has been documented in the EPIC progress notes.

## 2025-02-03 NOTE — PROGRESS NOTES
Chemotherapy Verification - SECONDARY RN       Height = 1.803m  Weight = 89.9kg  BSA = 2.12m2       Medication: Capecitabine   Dose: 825mg/m2  PO BID with radiation Calculated Dose: 1,749mg. Updated Yisel EDOUARD, per Dr. Nesbitt round down to 1650mg.                              (In mg/m2, AUC, mg/kg)       I confirm that this process was performed independently.

## 2025-02-04 PROCEDURE — 77301 RADIOTHERAPY DOSE PLAN IMRT: CPT | Performed by: RADIOLOGY

## 2025-02-04 PROCEDURE — 77338 DESIGN MLC DEVICE FOR IMRT: CPT | Mod: 26 | Performed by: RADIOLOGY

## 2025-02-04 PROCEDURE — 77338 DESIGN MLC DEVICE FOR IMRT: CPT | Performed by: RADIOLOGY

## 2025-02-04 PROCEDURE — 77301 RADIOTHERAPY DOSE PLAN IMRT: CPT | Mod: 26 | Performed by: RADIOLOGY

## 2025-02-04 PROCEDURE — 77300 RADIATION THERAPY DOSE PLAN: CPT | Mod: 26 | Performed by: RADIOLOGY

## 2025-02-04 PROCEDURE — 77300 RADIATION THERAPY DOSE PLAN: CPT | Performed by: RADIOLOGY

## 2025-02-10 ENCOUNTER — HOSPITAL ENCOUNTER (OUTPATIENT)
Dept: HEMATOLOGY ONCOLOGY | Facility: MEDICAL CENTER | Age: 60
End: 2025-02-10
Attending: STUDENT IN AN ORGANIZED HEALTH CARE EDUCATION/TRAINING PROGRAM
Payer: MEDICARE

## 2025-02-10 ENCOUNTER — TELEPHONE (OUTPATIENT)
Dept: RADIATION ONCOLOGY | Facility: MEDICAL CENTER | Age: 60
End: 2025-02-10
Payer: MEDICARE

## 2025-02-10 ENCOUNTER — HOSPITAL ENCOUNTER (OUTPATIENT)
Dept: RADIATION ONCOLOGY | Facility: MEDICAL CENTER | Age: 60
End: 2025-02-10
Attending: RADIOLOGY
Payer: MEDICARE

## 2025-02-10 ENCOUNTER — HOSPITAL ENCOUNTER (OUTPATIENT)
Dept: RADIATION ONCOLOGY | Facility: MEDICAL CENTER | Age: 60
End: 2025-02-10

## 2025-02-10 DIAGNOSIS — C20 RECTAL CANCER (HCC): ICD-10-CM

## 2025-02-10 LAB

## 2025-02-10 PROCEDURE — 77280 THER RAD SIMULAJ FIELD SMPL: CPT | Performed by: RADIOLOGY

## 2025-02-10 PROCEDURE — RXMED WILLOW AMBULATORY MEDICATION CHARGE: Performed by: STUDENT IN AN ORGANIZED HEALTH CARE EDUCATION/TRAINING PROGRAM

## 2025-02-10 PROCEDURE — 77386 HCHG IMRT DELIVERY COMPLEX: CPT | Performed by: RADIOLOGY

## 2025-02-10 PROCEDURE — 99214 OFFICE O/P EST MOD 30 MIN: CPT

## 2025-02-10 RX ORDER — ONDANSETRON 4 MG/1
4 TABLET, FILM COATED ORAL EVERY 4 HOURS PRN
Qty: 20 TABLET | Refills: 3 | Status: SHIPPED | OUTPATIENT
Start: 2025-02-10

## 2025-02-10 RX ORDER — PROCHLORPERAZINE MALEATE 10 MG
10 TABLET ORAL EVERY 6 HOURS PRN
Qty: 30 TABLET | Refills: 3 | Status: SHIPPED | OUTPATIENT
Start: 2025-02-10

## 2025-02-10 NOTE — PROGRESS NOTES
The following appointments, labs, and medications have been provided to the patient:  Line: Pt will have port placed after chemo rxt  ECHO: NA  WBC Support (g-csf): NA  Labs: CBC w/diff, CMP  Follow up appts: Scheduled  Chemo/immunotherapy class: Completed 2/10/2025  Chemotherapy Regimen Capecitabine + Rxt   Nausea medication: zofran/compazine prescribed  Pain medication: Per provider discretion  Nurse ann marie: Referred on 1/10/2025   Dietician:  KATE  SW: NA  FRA: Referred on 1/09/2025    Additional info/teaching for chemotherapy patients:  Neutropenia reminder card provided to patient    Additional teaching:  Provided link to NCCN patient guidelines    Patient was provided with drug specific handouts and [facility] side effects sheet. Patient verbalized that questions and concerns regarding treatment have been addressed. Consent to proceed with treatment was reviewed and signed during this visit.    Spent 60 minutes of continuous, non-interrupted, face-to-face patient contact in which greater than 50% of the visit was spent counseling and coordinating of care.

## 2025-02-10 NOTE — PROCEDURES
DATE OF SERVICE: 2/10/2025    Radiation Therapy Episodes       Active Episodes       Radiation Therapy: IMRT (2/10/2025)                   Radiation Treatments         Plan Last Treated On Elapsed Days Fractions Treated Prescribed Fraction Dose (cGy) Prescribed Total Dose (cGy)    Rectum 2/10/2025 0 @ 02/10/2025 1 of 25 180 4,500                  Reference Point Last Treated On Elapsed Days Most Recent Session Dose (cGy) Total Dose (cGy)    Rectum 2/10/2025 0 @ 02/10/2025 180 180                            First Visit Simple Simulation: Called by Truebeam machine to verify treatment parameters including:  treatment site, treatment dose, and treatment setup prior to first treatment. Image derived shifts reviewed in all appropriate planes.  Shifts approved.  Patient treated.    I have personally reviewed the relevant data, performed the target localization, and determined all relevant factors for this patient’s simulation.

## 2025-02-10 NOTE — TELEPHONE ENCOUNTER
"Nutrition Services: RD Consultation/ Telephone Encounter    Martínez Lyons Jr. is a 59 y.o. male with diagnosis of rectal cancer       Nutrition Assessment:      RD able to call to introduce self and nutrition services.  Pt answers, denies any current nutrition concerns or questions. Agreeable to write down RD contact information for follow-up.     Food/ Nutrition-related History:  No food-related allergies on file     Past Medical History:   Diagnosis Date    Acute renal failure (HCC)     Cirrhosis, alcoholic (HCC)     Hepatic cirrhosis (HCC)     liver failure 2009;      Hyperlipidemia     Hypertension     Psychiatric disorder     ETOH and drug addiction    Rectal cancer (HCC)        Past Surgical History:   Procedure Laterality Date    CA SIGMOIDOSCOPY,DIAGNOSTIC  12/31/2024    Procedure: FLEXIBLE SIGMOIDOSCOPY WITH BIOPSY;  Surgeon: Chriss Soliz M.D.;  Location: SURGERY [facility];  Service: Gastroenterology    HERNIA REPAIR      inguinal - right 1993    LAMINOTOMY      microdiscetomy multiple levels - L3-S1 - in [place] - 2001    OTHER ABDOMINAL SURGERY      inguinal hernia repair    OTHER ORTHOPEDIC SURGERY      rt elbow repair       Biochemical/ Anthropometrics  Treatment Regimen: IMRT with Dr Andrew, concurrent Xeloda with Dr. Nesbitt  Pertinent Labs: A1c 5.9%  Pertinent Meds: zofran, compazine, cozaar, protonix   Wt Readings from Last 1 Encounters:   01/24/25 89.7 kg (197 lb 12 oz)      Ht Readings from Last 1 Encounters:   01/24/25 1.803 m (5' 11\")      BMI Readings from Last 1 Encounters:   01/24/25 27.58 kg/m²   BMI Classification: Overweight   Nutrition-Focused Physical Exam: Unable to assess given telephone encounter     Weight History:  Wt Readings from Last 6 Encounters:   01/24/25 89.7 kg (197 lb 12 oz)   01/30/25 89.9 kg (198 lb 3.2 oz)   12/31/24 86 kg (189 lb 9.5 oz)   12/09/24 91.2 kg (201 lb)   06/08/16 79.8 kg (176 lb)   03/02/16 80.7 kg (178 lb)     Weight Change/Malnutrition Risk: " weight appears to fluctuate, though overall presents with stability.           Nutrition Interventions:      Introduced self and discussed role of dietitian throughout treatment process. Discussed potential side effects to anticipate with regimen and how nutrition may help to mitigate severity of side effects  RD provided contact information. Encouraged pt to reach out as questions/concerns arise.     Nutrition Monitoring and Evaluation:     LBM preservation throughout treatment  Appropriate management of side effects through medication management and nutrition modification as warranted    Pt reports understanding and was receptive to information provided.   RD available PRN   [phone]

## 2025-02-11 ENCOUNTER — HOSPITAL ENCOUNTER (OUTPATIENT)
Dept: RADIATION ONCOLOGY | Facility: MEDICAL CENTER | Age: 60
End: 2025-02-11
Payer: MEDICARE

## 2025-02-11 ENCOUNTER — PHARMACY VISIT (OUTPATIENT)
Dept: PHARMACY | Facility: MEDICAL CENTER | Age: 60
End: 2025-02-11
Payer: COMMERCIAL

## 2025-02-11 LAB

## 2025-02-11 PROCEDURE — 77386 HCHG IMRT DELIVERY COMPLEX: CPT | Performed by: RADIOLOGY

## 2025-02-11 PROCEDURE — 77014 PR CT GUIDANCE PLACEMENT RAD THERAPY FIELDS: CPT | Mod: 26 | Performed by: RADIOLOGY

## 2025-02-12 ENCOUNTER — HOSPITAL ENCOUNTER (OUTPATIENT)
Dept: RADIATION ONCOLOGY | Facility: MEDICAL CENTER | Age: 60
End: 2025-02-12
Payer: MEDICARE

## 2025-02-12 ENCOUNTER — HOSPITAL ENCOUNTER (OUTPATIENT)
Dept: RADIATION ONCOLOGY | Facility: MEDICAL CENTER | Age: 60
End: 2025-02-12
Attending: RADIOLOGY
Payer: MEDICARE

## 2025-02-12 VITALS
HEART RATE: 78 BPM | WEIGHT: 194 LBS | RESPIRATION RATE: 18 BRPM | TEMPERATURE: 97.7 F | BODY MASS INDEX: 27.07 KG/M2 | SYSTOLIC BLOOD PRESSURE: 144 MMHG | OXYGEN SATURATION: 99 % | DIASTOLIC BLOOD PRESSURE: 79 MMHG

## 2025-02-12 LAB

## 2025-02-12 PROCEDURE — 77336 RADIATION PHYSICS CONSULT: CPT | Performed by: RADIOLOGY

## 2025-02-12 PROCEDURE — 77386 HCHG IMRT DELIVERY COMPLEX: CPT | Performed by: RADIOLOGY

## 2025-02-12 PROCEDURE — 77014 PR CT GUIDANCE PLACEMENT RAD THERAPY FIELDS: CPT | Mod: 26 | Performed by: RADIOLOGY

## 2025-02-12 ASSESSMENT — PAIN SCALES - GENERAL: PAINLEVEL_OUTOF10: NO PAIN

## 2025-02-12 ASSESSMENT — FIBROSIS 4 INDEX: FIB4 SCORE: 1.13

## 2025-02-12 NOTE — ON TREATMENT VISIT
"ON TREATMENT  NOTE  RADIATION ONCOLOGY DEPARTMENT    Patient name:  Martínez Lyons Jr.    Primary Physician:  Pcp Pt States None MRN: [id number]  CSN: [id number]   Referring physician:  Chriss Soliz M.D.   : 1965, 59 y.o.     ENCOUNTER DATE:  2025      DIAGNOSIS:  Rectal cancer (HCC)  Staging form: Colon and Rectum, AJCC 8th Edition  - Clinical stage from 2025: Stage IIA (cT3, cN0, cM0) - Signed by Marco Andrew M.D. on 2025  Total positive nodes: 0  Histologic grade (G): G2  Histologic grading system: 4 grade system      TREATMENT SUMMARY:  Course First Treatment Date 02/10/2025  Course Last Treatment Date 2025  Radiation Treatments       Active   Plans   Rectum   Most recent treatment: Dose planned: 180 cGy (fraction 3 of 25 on 2025)   Total: Dose planned: 4,500 cGy   Elapsed Days: 2 @ 2025           Historical   No historical radiation treatments to show.                 SUBJECTIVE:  Well appearing    VITAL SIGNS:      2025    12:07 PM 2025    11:12 AM 2025     2:01 PM 2025     9:56 AM 2025     8:00 AM 2025     7:44 AM 2025     5:24 AM   Vitals   SYSTOLIC 144 118 166 174 167 167 131   DIASTOLIC 79 62 90 86 80 80 72   Pulse 78 77 97 100 87 76    Temperature 36.5 °C (97.7 °F) 36 °C (96.8 °F) 36.4 °C (97.6 °F)   36.7 °C (98.1 °F)    Respiration 18  18   16    Weight 194.01 198.2 197.75       Height  1.803 m (5' 10.98\") 1.803 m (5' 11\")       BMI 27.07 kg/m2 27.66 kg/m2 27.58 kg/m2       Pulse Oximetry 99 % 97 % 98 %         KPS: 100, Fully active, able to carry on all pre-disease performed without restriction (ECOG equivalent 0)  Encounter Vitals  Temperature: 36.5 °C (97.7 °F)  Blood Pressure: (!) 144/79  Pulse: 78  Respiration: 18  Pulse Oximetry: 99 %  Weight: 88 kg (194 lb 0.1 oz)  Weight Source: Stand Up Scale  Pain Score: No pain      2025    12:07 PM 2025    11:12 AM 2025     2:01 PM   Pain Assessment   Pain Score " NO PAIN NO PAIN NO PAIN          PHYSICAL EXAM:  Physical Exam         2/12/2025    12:09 PM   Toxicity Assessment   Toxicity Assessment Male Pelvis   Fatigue (lethargy, malaise, asthenia) None   Radiation Dermatitis None   Anorexia None   Colitis None   Constipation None   Dehydration None   Diarrhea w/o Colostomy None   Flatulence None   Nausea None   Proctitis None   Vomiting None   RT - Pain due to RT None   Tumor Pain (onset or exacerbation of tumor pain due to treatment) None   Dysuria (painful urination) None   Urinary Frequency Normal   Urinary Urgency None   Bladder Spasms Absent   Incontinence None   Urinary Retention Normal       CURRENT MEDICATIONS:    Current Outpatient Medications:     ondansetron (ZOFRAN) 4 MG Tab tablet, Take 1 Tablet by mouth every four hours as needed for Nausea/Vomiting., Disp: 20 Tablet, Rfl: 3    prochlorperazine (COMPAZINE) 10 MG Tab, Take 1 Tablet by mouth every 6 hours as needed for Nausea/Vomiting., Disp: 30 Tablet, Rfl: 3    capecitabine (XELODA) 500 MG tablet, Take 3 tabs of 500mg strength and 1 tab of 150mg strength (total of 1650 mg) by mouth 2 times a day (at least 12 hrs apart and 30 min after food) for 30 days (Mon-Friday with radiation)., Disp: 180 Tablet, Rfl: 0    capecitabine (XELODA) 150 MG tablet, Take 3 tabs of 500mg strength and 1 tab of 150mg strength (total of 1650 mg) by mouth 2 times a day (at least 12 hrs apart and 30 min after food) for 30 days (Mon-Friday with radiation)., Disp: 60 Tablet, Rfl: 0    acetaminophen (TYLENOL) 325 MG Tab, Take 650 mg by mouth every four hours as needed for Mild Pain., Disp: , Rfl:     losartan (COZAAR) 50 MG Tab, Take 1 Tablet by mouth every day. (Patient taking differently: Take 100 mg by mouth every day.), Disp: 60 Tablet, Rfl: 1    pantoprazole (PROTONIX) 40 MG Tablet Delayed Response, Take 40 mg by mouth every day., Disp: , Rfl:     LABORATORY DATA:   Lab Results   Component Value Date/Time    SODIUM 140 12/31/2024  08:00 AM    POTASSIUM 3.9 12/31/2024 08:00 AM    CHLORIDE 106 12/31/2024 08:00 AM    CO2 24 12/31/2024 08:00 AM    GLUCOSE 97 12/31/2024 08:00 AM    BUN 10 12/31/2024 08:00 AM    CREATININE 1.15 12/31/2024 08:00 AM    CREATININE 1.4 05/20/2009 01:30 AM       Lab Results   Component Value Date/Time    WBC 9.1 01/17/2025 10:37 AM    WBC 6.9 12/31/2024 08:00 AM    RBC 5.04 01/17/2025 10:37 AM    RBC 4.80 12/31/2024 08:00 AM    HEMOGLOBIN 14 01/17/2025 10:37 AM    HEMOGLOBIN 13.1 (L) 12/31/2024 08:00 AM    HEMATOCRIT 43.2 01/17/2025 10:37 AM    HEMATOCRIT 40.8 (L) 12/31/2024 08:00 AM    MCV 86 01/17/2025 10:37 AM    MCV 85.0 12/31/2024 08:00 AM    MCH 27.8 01/17/2025 10:37 AM    MCH 27.3 12/31/2024 08:00 AM    MCHC 32.4 01/17/2025 10:37 AM    MCHC 32.1 (L) 12/31/2024 08:00 AM    PLATELETCT 275 01/17/2025 10:37 AM    PLATELETCT 244 12/31/2024 08:00 AM         RADIOLOGY DATA:  CT-CHEST,ABDOMEN,PELVIS WITH  Result Date: 12/28/2024  1. No CT evidence of metastatic disease in the chest, abdomen or pelvis. 2. Irregular wall thickening of the rectal could relate to known rectal cancer. 3. Cirrhotic appearing liver. 4. Cholelithiasis.    MR-RECTAL/PROSTATE PROTOCOL  Result Date: 12/27/2024  Circumferential 5 cm low rectal mass, abutting the internal anal sphincter. T3e N0       IMPRESSION:  Cancer Staging   Rectal cancer (HCC)  Staging form: Colon and Rectum, AJCC 8th Edition  - Clinical stage from 1/24/2025: Stage IIA (cT3, cN0, cM0) - Signed by Marco Andrew M.D. on 1/24/2025      PLAN:  No change in treatment plan    Disposition:  Treatment plan and imaging reviewed. Questions answered. Continue therapy outlined.     Marco Andrew M.D.    No orders of the defined types were placed in this encounter.

## 2025-02-13 ENCOUNTER — HOSPITAL ENCOUNTER (OUTPATIENT)
Dept: RADIATION ONCOLOGY | Facility: MEDICAL CENTER | Age: 60
End: 2025-02-13
Payer: MEDICARE

## 2025-02-13 ENCOUNTER — HOSPITAL ENCOUNTER (OUTPATIENT)
Dept: LAB | Facility: MEDICAL CENTER | Age: 60
End: 2025-02-13
Attending: STUDENT IN AN ORGANIZED HEALTH CARE EDUCATION/TRAINING PROGRAM
Payer: MEDICARE

## 2025-02-13 DIAGNOSIS — C20 RECTAL CANCER (HCC): ICD-10-CM

## 2025-02-13 LAB
ALBUMIN SERPL BCP-MCNC: 4.3 G/DL (ref 3.2–4.9)
ALBUMIN/GLOB SERPL: 1.3 G/DL
ALP SERPL-CCNC: 77 U/L (ref 30–99)
ALT SERPL-CCNC: 15 U/L (ref 2–50)
ANION GAP SERPL CALC-SCNC: 11 MMOL/L (ref 7–16)
AST SERPL-CCNC: 20 U/L (ref 12–45)
BASOPHILS # BLD AUTO: 0.9 % (ref 0–1.8)
BASOPHILS # BLD: 0.06 K/UL (ref 0–0.12)
BILIRUB SERPL-MCNC: 0.5 MG/DL (ref 0.1–1.5)
BUN SERPL-MCNC: 13 MG/DL (ref 8–22)
CALCIUM ALBUM COR SERPL-MCNC: 9.6 MG/DL (ref 8.5–10.5)
CALCIUM SERPL-MCNC: 9.8 MG/DL (ref 8.5–10.5)
CHEMOTHERAPY INFUSION START DATE: NORMAL
CHEMOTHERAPY RECORDS: 1.8 GY
CHEMOTHERAPY RECORDS: 4500 CGY
CHEMOTHERAPY RECORDS: NORMAL
CHEMOTHERAPY RX CANCER: NORMAL
CHLORIDE SERPL-SCNC: 100 MMOL/L (ref 96–112)
CO2 SERPL-SCNC: 28 MMOL/L (ref 20–33)
CREAT SERPL-MCNC: 1.16 MG/DL (ref 0.5–1.4)
DATE 1ST CHEMO CANCER: NORMAL
EOSINOPHIL # BLD AUTO: 0.1 K/UL (ref 0–0.51)
EOSINOPHIL NFR BLD: 1.4 % (ref 0–6.9)
ERYTHROCYTE [DISTWIDTH] IN BLOOD BY AUTOMATED COUNT: 42.8 FL (ref 35.9–50)
GFR SERPLBLD CREATININE-BSD FMLA CKD-EPI: 72 ML/MIN/1.73 M 2
GLOBULIN SER CALC-MCNC: 3.2 G/DL (ref 1.9–3.5)
GLUCOSE SERPL-MCNC: 92 MG/DL (ref 65–99)
HCT VFR BLD AUTO: 40.1 % (ref 42–52)
HGB BLD-MCNC: 12.7 G/DL (ref 14–18)
IMM GRANULOCYTES # BLD AUTO: 0.03 K/UL (ref 0–0.11)
IMM GRANULOCYTES NFR BLD AUTO: 0.4 % (ref 0–0.9)
LYMPHOCYTES # BLD AUTO: 1.26 K/UL (ref 1–4.8)
LYMPHOCYTES NFR BLD: 17.9 % (ref 22–41)
MCH RBC QN AUTO: 27.4 PG (ref 27–33)
MCHC RBC AUTO-ENTMCNC: 31.7 G/DL (ref 32.3–36.5)
MCV RBC AUTO: 86.4 FL (ref 81.4–97.8)
MONOCYTES # BLD AUTO: 0.56 K/UL (ref 0–0.85)
MONOCYTES NFR BLD AUTO: 8 % (ref 0–13.4)
NEUTROPHILS # BLD AUTO: 5.01 K/UL (ref 1.82–7.42)
NEUTROPHILS NFR BLD: 71.4 % (ref 44–72)
NRBC # BLD AUTO: 0 K/UL
NRBC BLD-RTO: 0 /100 WBC (ref 0–0.2)
PLATELET # BLD AUTO: 297 K/UL (ref 164–446)
PMV BLD AUTO: 9.8 FL (ref 9–12.9)
POTASSIUM SERPL-SCNC: 4.7 MMOL/L (ref 3.6–5.5)
PROT SERPL-MCNC: 7.5 G/DL (ref 6–8.2)
RAD ONC ARIA COURSE LAST TREATMENT DATE: NORMAL
RAD ONC ARIA COURSE TREATMENT ELAPSED DAYS: NORMAL
RAD ONC ARIA REFERENCE POINT DOSAGE GIVEN TO DATE: 7.2 GY
RAD ONC ARIA REFERENCE POINT ID: NORMAL
RAD ONC ARIA REFERENCE POINT SESSION DOSAGE GIVEN: 1.8 GY
RBC # BLD AUTO: 4.64 M/UL (ref 4.7–6.1)
SODIUM SERPL-SCNC: 139 MMOL/L (ref 135–145)
WBC # BLD AUTO: 7 K/UL (ref 4.8–10.8)

## 2025-02-13 PROCEDURE — 36415 COLL VENOUS BLD VENIPUNCTURE: CPT

## 2025-02-13 PROCEDURE — 77386 HCHG IMRT DELIVERY COMPLEX: CPT | Performed by: RADIOLOGY

## 2025-02-13 PROCEDURE — 80053 COMPREHEN METABOLIC PANEL: CPT

## 2025-02-13 PROCEDURE — 85025 COMPLETE CBC W/AUTO DIFF WBC: CPT

## 2025-02-13 PROCEDURE — 77014 PR CT GUIDANCE PLACEMENT RAD THERAPY FIELDS: CPT | Mod: 26 | Performed by: RADIOLOGY

## 2025-02-14 ENCOUNTER — HOSPITAL ENCOUNTER (OUTPATIENT)
Dept: RADIATION ONCOLOGY | Facility: MEDICAL CENTER | Age: 60
End: 2025-02-14
Payer: MEDICARE

## 2025-02-14 LAB

## 2025-02-14 PROCEDURE — 77427 RADIATION TX MANAGEMENT X5: CPT | Performed by: RADIOLOGY

## 2025-02-14 PROCEDURE — 77014 PR CT GUIDANCE PLACEMENT RAD THERAPY FIELDS: CPT | Mod: 26 | Performed by: RADIOLOGY

## 2025-02-14 PROCEDURE — 77386 HCHG IMRT DELIVERY COMPLEX: CPT | Performed by: RADIOLOGY

## 2025-02-18 ENCOUNTER — HOSPITAL ENCOUNTER (OUTPATIENT)
Dept: RADIATION ONCOLOGY | Facility: MEDICAL CENTER | Age: 60
End: 2025-02-18
Payer: MEDICARE

## 2025-02-18 LAB

## 2025-02-18 PROCEDURE — 77386 HCHG IMRT DELIVERY COMPLEX: CPT | Performed by: RADIOLOGY

## 2025-02-18 PROCEDURE — 77014 PR CT GUIDANCE PLACEMENT RAD THERAPY FIELDS: CPT | Mod: 26 | Performed by: RADIOLOGY

## 2025-02-18 PROCEDURE — 77336 RADIATION PHYSICS CONSULT: CPT | Performed by: RADIOLOGY

## 2025-02-19 ENCOUNTER — HOSPITAL ENCOUNTER (OUTPATIENT)
Dept: RADIATION ONCOLOGY | Facility: MEDICAL CENTER | Age: 60
End: 2025-02-19
Attending: RADIOLOGY
Payer: MEDICARE

## 2025-02-19 ENCOUNTER — HOSPITAL ENCOUNTER (OUTPATIENT)
Dept: RADIATION ONCOLOGY | Facility: MEDICAL CENTER | Age: 60
End: 2025-02-19
Payer: MEDICARE

## 2025-02-19 ENCOUNTER — PATIENT OUTREACH (OUTPATIENT)
Dept: ONCOLOGY | Facility: MEDICAL CENTER | Age: 60
End: 2025-02-19
Payer: MEDICARE

## 2025-02-19 VITALS
BODY MASS INDEX: 27.35 KG/M2 | HEART RATE: 77 BPM | OXYGEN SATURATION: 99 % | TEMPERATURE: 97.3 F | RESPIRATION RATE: 18 BRPM | DIASTOLIC BLOOD PRESSURE: 67 MMHG | SYSTOLIC BLOOD PRESSURE: 138 MMHG | WEIGHT: 195.99 LBS

## 2025-02-19 LAB
CHEMOTHERAPY INFUSION START DATE: NORMAL
CHEMOTHERAPY RECORDS: 1.8 GY
CHEMOTHERAPY RECORDS: 4500 CGY
CHEMOTHERAPY RECORDS: NORMAL
CHEMOTHERAPY RX CANCER: NORMAL
DATE 1ST CHEMO CANCER: NORMAL
RAD ONC ARIA COURSE LAST TREATMENT DATE: NORMAL
RAD ONC ARIA COURSE TREATMENT ELAPSED DAYS: NORMAL
RAD ONC ARIA REFERENCE POINT DOSAGE GIVEN TO DATE: 12.6 GY
RAD ONC ARIA REFERENCE POINT ID: NORMAL
RAD ONC ARIA REFERENCE POINT SESSION DOSAGE GIVEN: 1.8 GY

## 2025-02-19 PROCEDURE — 77014 PR CT GUIDANCE PLACEMENT RAD THERAPY FIELDS: CPT | Mod: 26 | Performed by: RADIOLOGY

## 2025-02-19 PROCEDURE — 77386 HCHG IMRT DELIVERY COMPLEX: CPT | Performed by: RADIOLOGY

## 2025-02-19 ASSESSMENT — FIBROSIS 4 INDEX: FIB4 SCORE: 1.03

## 2025-02-19 ASSESSMENT — PAIN SCALES - GENERAL: PAINLEVEL_OUTOF10: NO PAIN

## 2025-02-19 NOTE — ON TREATMENT VISIT
"ON TREATMENT  NOTE  RADIATION ONCOLOGY DEPARTMENT    Patient name:  Martínez Lyons Jr.    Primary Physician:  Pcp Pt States None MRN: [id number]  CSN: [id number]   Referring physician:  Chriss Soliz M.D.   : 1965, 59 y.o.     ENCOUNTER DATE:  2025      DIAGNOSIS:  Rectal cancer (HCC)  Staging form: Colon and Rectum, AJCC 8th Edition  - Clinical stage from 2025: Stage IIA (cT3, cN0, cM0) - Signed by Marco Andrew M.D. on 2025  Total positive nodes: 0  Histologic grade (G): G2  Histologic grading system: 4 grade system      TREATMENT SUMMARY:  Course First Treatment Date 02/10/2025  Course Last Treatment Date 2025  Radiation Treatments       Active   Plans   Rectum   Most recent treatment: Dose planned: 180 cGy (fraction 7 of 25 on 2025)   Total: Dose planned: 4,500 cGy   Elapsed Days: 9 @ 2025           Historical   No historical radiation treatments to show.                 SUBJECTIVE:  Well appearing    VITAL SIGNS:      2025    11:38 AM 2025    12:07 PM 2025    11:12 AM 2025     2:01 PM 2025     9:56 AM 2025     8:00 AM 2025     7:44 AM   Vitals   SYSTOLIC 138 144 118 166 174 167 167   DIASTOLIC 67 79 62 90 86 80 80   Pulse 77 78 77 97 100 87 76   Temperature 36.3 °C (97.3 °F) 36.5 °C (97.7 °F) 36 °C (96.8 °F) 36.4 °C (97.6 °F)   36.7 °C (98.1 °F)   Respiration 18 18  18   16   Weight 195.99 194.01 198.2 197.75      Height   1.803 m (5' 10.98\") 1.803 m (5' 11\")      BMI 27.35 kg/m2 27.07 kg/m2 27.66 kg/m2 27.58 kg/m2      Pulse Oximetry 99 % 99 % 97 % 98 %        KPS: 100, Fully active, able to carry on all pre-disease performed without restriction (ECOG equivalent 0)  Encounter Vitals  Temperature: 36.3 °C (97.3 °F)  Blood Pressure: 138/67  Pulse: 77  Respiration: 18  Pulse Oximetry: 99 %  Weight: 88.9 kg (195 lb 15.8 oz)  Weight Source: Stand Up Scale  Pain Score: No pain      2025    11:38 AM 2025    12:07 PM 2025 "    11:12 AM 1/24/2025     2:01 PM   Pain Assessment   Pain Score NO PAIN NO PAIN NO PAIN NO PAIN          PHYSICAL EXAM:  Physical Exam         2/19/2025    11:40 AM 2/12/2025    12:09 PM   Toxicity Assessment   Toxicity Assessment Male Pelvis Male Pelvis   Fatigue (lethargy, malaise, asthenia) Increased fatigue over baseline, but not altering normal activities None   Radiation Dermatitis None None   Anorexia None None   Colitis None None   Constipation None None   Dehydration None None   Diarrhea w/o Colostomy None None   Flatulence None None   Nausea None None   Proctitis Increased stool frequency, occasional blood-streaked stools or rectal discomfort (including hemorrhoids) not requiring medication None   Vomiting None None   RT - Pain due to RT None None   Tumor Pain (onset or exacerbation of tumor pain due to treatment) None None   Dysuria (painful urination) None None   Urinary Frequency Normal Normal   Urinary Urgency None None   Bladder Spasms Absent Absent   Incontinence None None   Urinary Retention Normal Normal       CURRENT MEDICATIONS:    Current Outpatient Medications:     ondansetron (ZOFRAN) 4 MG Tab tablet, Take 1 Tablet by mouth every four hours as needed for Nausea/Vomiting., Disp: 20 Tablet, Rfl: 3    prochlorperazine (COMPAZINE) 10 MG Tab, Take 1 Tablet by mouth every 6 hours as needed for Nausea/Vomiting., Disp: 30 Tablet, Rfl: 3    capecitabine (XELODA) 500 MG tablet, Take 3 tabs of 500mg strength and 1 tab of 150mg strength (total of 1650 mg) by mouth 2 times a day (at least 12 hrs apart and 30 min after food) for 30 days (Mon-Friday with radiation)., Disp: 180 Tablet, Rfl: 0    capecitabine (XELODA) 150 MG tablet, Take 3 tabs of 500mg strength and 1 tab of 150mg strength (total of 1650 mg) by mouth 2 times a day (at least 12 hrs apart and 30 min after food) for 30 days (Mon-Friday with radiation)., Disp: 60 Tablet, Rfl: 0    acetaminophen (TYLENOL) 325 MG Tab, Take 650 mg by mouth every  four hours as needed for Mild Pain., Disp: , Rfl:     losartan (COZAAR) 50 MG Tab, Take 1 Tablet by mouth every day. (Patient taking differently: Take 100 mg by mouth every day.), Disp: 60 Tablet, Rfl: 1    pantoprazole (PROTONIX) 40 MG Tablet Delayed Response, Take 40 mg by mouth every day., Disp: , Rfl:     LABORATORY DATA:   Lab Results   Component Value Date/Time    SODIUM 139 02/13/2025 12:18 PM    POTASSIUM 4.7 02/13/2025 12:18 PM    CHLORIDE 100 02/13/2025 12:18 PM    CO2 28 02/13/2025 12:18 PM    GLUCOSE 92 02/13/2025 12:18 PM    BUN 13 02/13/2025 12:18 PM    CREATININE 1.16 02/13/2025 12:18 PM    CREATININE 1.4 05/20/2009 01:30 AM       Lab Results   Component Value Date/Time    WBC 7.0 02/13/2025 12:18 PM    RBC 4.64 (L) 02/13/2025 12:18 PM    HEMOGLOBIN 12.7 (L) 02/13/2025 12:18 PM    HEMATOCRIT 40.1 (L) 02/13/2025 12:18 PM    MCV 86.4 02/13/2025 12:18 PM    MCH 27.4 02/13/2025 12:18 PM    MCHC 31.7 (L) 02/13/2025 12:18 PM    PLATELETCT 297 02/13/2025 12:18 PM         RADIOLOGY DATA:  CT-CHEST,ABDOMEN,PELVIS WITH  Result Date: 12/28/2024  1. No CT evidence of metastatic disease in the chest, abdomen or pelvis. 2. Irregular wall thickening of the rectal could relate to known rectal cancer. 3. Cirrhotic appearing liver. 4. Cholelithiasis.    MR-RECTAL/PROSTATE PROTOCOL  Result Date: 12/27/2024  Circumferential 5 cm low rectal mass, abutting the internal anal sphincter. T3e N0       IMPRESSION:  Cancer Staging   Rectal cancer (HCC)  Staging form: Colon and Rectum, AJCC 8th Edition  - Clinical stage from 1/24/2025: Stage IIA (cT3, cN0, cM0) - Signed by Marco Andrew M.D. on 1/24/2025      PLAN:  No change in treatment plan    Disposition:  Treatment plan and imaging reviewed. Questions answered. Continue therapy outlined.     Marco Andrew M.D.    No orders of the defined types were placed in this encounter.

## 2025-02-20 ENCOUNTER — HOSPITAL ENCOUNTER (OUTPATIENT)
Dept: RADIATION ONCOLOGY | Facility: MEDICAL CENTER | Age: 60
End: 2025-02-20
Payer: MEDICARE

## 2025-02-20 ENCOUNTER — PATIENT OUTREACH (OUTPATIENT)
Dept: ONCOLOGY | Facility: MEDICAL CENTER | Age: 60
End: 2025-02-20
Payer: MEDICARE

## 2025-02-20 LAB

## 2025-02-20 PROCEDURE — 77386 HCHG IMRT DELIVERY COMPLEX: CPT | Performed by: RADIOLOGY

## 2025-02-20 PROCEDURE — 77014 PR CT GUIDANCE PLACEMENT RAD THERAPY FIELDS: CPT | Mod: 26 | Performed by: RADIOLOGY

## 2025-02-20 NOTE — PROGRESS NOTES
On February 19, 2025, , Mary Ball, was informed by JAVAN Luque that pt. was reporting that he supposed to receive gas assistance but never received anything. AMY Ball met with pt. in person an introduced self. AMY Ball informed pt. that per his chart it looks like he was given the applications for assistance. AMY Ball inquired if pt. was able to return them and he stated that did not return them and the applications may be at home. AMY Ball completed an assessment with pt and he confirmed that he lives in [place], however he is currently staying at a friends home in [place] because he has his dog, and he does not need lodging assistance at this time. Pt. reported that he thinks he has enough gas money to get him through the week, but does not have enough money for gas for next week's treatments. AMY Ball asked pt. to fill out a new gas assistance application.   Pt. confirmed that he currently has Medicare. Pt. reported that he is able to get food and his prescriptions at this time. Pt. stated that his mental health is “okay”, but that he has his dog, who provides him with emotional support and companionship. Pt. declined mental health resources at this time. AMY Ball informed pt. that she will submit his application for gas assistance. AMY Ball provided pt. with 's contact information.

## 2025-02-20 NOTE — PROGRESS NOTES
On February 20, 2025, JOVAN Dubois was informed pt. was at  and was informed he would be seeing JOVAN Dubois.  JOVAN Dubois introduced herself to pt. and informed him, his  was Mary Ball.  Pt. shared he spoke with JOVAN Ball and he completed gas card application.  JOVAN Dubois informed pt. she will process his application and will leave a gas card for him tomorrow morning.  Pt. thanked JOVAN Dubois for her assistance.

## 2025-02-20 NOTE — PROGRESS NOTES
On February 20, 2025, Oncology Social Worker Sylvie Dubois processed pt.'s [facility] Patient Transportation [employer] application.  Pt. will receive $200 in gas card to assist with transportation.  Gas card was left at Radiation [facility]  for pt. to .

## 2025-02-21 ENCOUNTER — HOSPITAL ENCOUNTER (OUTPATIENT)
Dept: RADIATION ONCOLOGY | Facility: MEDICAL CENTER | Age: 60
End: 2025-02-21
Payer: MEDICARE

## 2025-02-21 ENCOUNTER — HOSPITAL ENCOUNTER (OUTPATIENT)
Dept: LAB | Facility: MEDICAL CENTER | Age: 60
End: 2025-02-21
Attending: STUDENT IN AN ORGANIZED HEALTH CARE EDUCATION/TRAINING PROGRAM
Payer: MEDICARE

## 2025-02-21 DIAGNOSIS — C20 RECTAL CANCER (HCC): ICD-10-CM

## 2025-02-21 LAB
BASOPHILS # BLD AUTO: 0.5 % (ref 0–1.8)
BASOPHILS # BLD: 0.02 K/UL (ref 0–0.12)
CHEMOTHERAPY INFUSION START DATE: NORMAL
CHEMOTHERAPY RECORDS: 1.8 GY
CHEMOTHERAPY RECORDS: 4500 CGY
CHEMOTHERAPY RECORDS: NORMAL
CHEMOTHERAPY RX CANCER: NORMAL
DATE 1ST CHEMO CANCER: NORMAL
EOSINOPHIL # BLD AUTO: 0.08 K/UL (ref 0–0.51)
EOSINOPHIL NFR BLD: 2.1 % (ref 0–6.9)
ERYTHROCYTE [DISTWIDTH] IN BLOOD BY AUTOMATED COUNT: 45 FL (ref 35.9–50)
HCT VFR BLD AUTO: 39.7 % (ref 42–52)
HGB BLD-MCNC: 12.2 G/DL (ref 14–18)
IMM GRANULOCYTES # BLD AUTO: 0.02 K/UL (ref 0–0.11)
IMM GRANULOCYTES NFR BLD AUTO: 0.5 % (ref 0–0.9)
LYMPHOCYTES # BLD AUTO: 0.78 K/UL (ref 1–4.8)
LYMPHOCYTES NFR BLD: 20.5 % (ref 22–41)
MCH RBC QN AUTO: 27.6 PG (ref 27–33)
MCHC RBC AUTO-ENTMCNC: 30.7 G/DL (ref 32.3–36.5)
MCV RBC AUTO: 89.8 FL (ref 81.4–97.8)
MONOCYTES # BLD AUTO: 0.44 K/UL (ref 0–0.85)
MONOCYTES NFR BLD AUTO: 11.6 % (ref 0–13.4)
NEUTROPHILS # BLD AUTO: 2.46 K/UL (ref 1.82–7.42)
NEUTROPHILS NFR BLD: 64.8 % (ref 44–72)
NRBC # BLD AUTO: 0 K/UL
NRBC BLD-RTO: 0 /100 WBC (ref 0–0.2)
PLATELET # BLD AUTO: 176 K/UL (ref 164–446)
PMV BLD AUTO: 10.2 FL (ref 9–12.9)
RAD ONC ARIA COURSE LAST TREATMENT DATE: NORMAL
RAD ONC ARIA COURSE TREATMENT ELAPSED DAYS: NORMAL
RAD ONC ARIA REFERENCE POINT DOSAGE GIVEN TO DATE: 16.2 GY
RAD ONC ARIA REFERENCE POINT ID: NORMAL
RAD ONC ARIA REFERENCE POINT SESSION DOSAGE GIVEN: 1.8 GY
RBC # BLD AUTO: 4.42 M/UL (ref 4.7–6.1)
WBC # BLD AUTO: 3.8 K/UL (ref 4.8–10.8)

## 2025-02-21 PROCEDURE — 77014 PR CT GUIDANCE PLACEMENT RAD THERAPY FIELDS: CPT | Mod: 26 | Performed by: RADIOLOGY

## 2025-02-21 PROCEDURE — 80053 COMPREHEN METABOLIC PANEL: CPT

## 2025-02-21 PROCEDURE — 36415 COLL VENOUS BLD VENIPUNCTURE: CPT

## 2025-02-21 PROCEDURE — 77386 HCHG IMRT DELIVERY COMPLEX: CPT | Performed by: RADIOLOGY

## 2025-02-21 PROCEDURE — 85025 COMPLETE CBC W/AUTO DIFF WBC: CPT

## 2025-02-22 LAB
ALBUMIN SERPL BCP-MCNC: 4.2 G/DL (ref 3.2–4.9)
ALBUMIN/GLOB SERPL: 1.4 G/DL
ALP SERPL-CCNC: 68 U/L (ref 30–99)
ALT SERPL-CCNC: 12 U/L (ref 2–50)
ANION GAP SERPL CALC-SCNC: 10 MMOL/L (ref 7–16)
AST SERPL-CCNC: 20 U/L (ref 12–45)
BILIRUB SERPL-MCNC: 0.6 MG/DL (ref 0.1–1.5)
BUN SERPL-MCNC: 13 MG/DL (ref 8–22)
CALCIUM ALBUM COR SERPL-MCNC: 9.5 MG/DL (ref 8.5–10.5)
CALCIUM SERPL-MCNC: 9.7 MG/DL (ref 8.5–10.5)
CHLORIDE SERPL-SCNC: 103 MMOL/L (ref 96–112)
CO2 SERPL-SCNC: 26 MMOL/L (ref 20–33)
CREAT SERPL-MCNC: 1.08 MG/DL (ref 0.5–1.4)
GFR SERPLBLD CREATININE-BSD FMLA CKD-EPI: 79 ML/MIN/1.73 M 2
GLOBULIN SER CALC-MCNC: 3 G/DL (ref 1.9–3.5)
GLUCOSE SERPL-MCNC: 95 MG/DL (ref 65–99)
POTASSIUM SERPL-SCNC: 4.4 MMOL/L (ref 3.6–5.5)
PROT SERPL-MCNC: 7.2 G/DL (ref 6–8.2)
SODIUM SERPL-SCNC: 139 MMOL/L (ref 135–145)

## 2025-02-24 ENCOUNTER — HOSPITAL ENCOUNTER (OUTPATIENT)
Dept: RADIATION ONCOLOGY | Facility: MEDICAL CENTER | Age: 60
End: 2025-02-24

## 2025-02-24 LAB

## 2025-02-24 PROCEDURE — 77386 HCHG IMRT DELIVERY COMPLEX: CPT | Performed by: RADIOLOGY

## 2025-02-24 PROCEDURE — 77014 PR CT GUIDANCE PLACEMENT RAD THERAPY FIELDS: CPT | Mod: 26 | Performed by: RADIOLOGY

## 2025-02-24 PROCEDURE — 77427 RADIATION TX MANAGEMENT X5: CPT | Performed by: RADIOLOGY

## 2025-02-25 ENCOUNTER — HOSPITAL ENCOUNTER (OUTPATIENT)
Dept: RADIATION ONCOLOGY | Facility: MEDICAL CENTER | Age: 60
End: 2025-02-25

## 2025-02-25 ENCOUNTER — HOSPITAL ENCOUNTER (OUTPATIENT)
Dept: HEMATOLOGY ONCOLOGY | Facility: MEDICAL CENTER | Age: 60
End: 2025-02-25
Attending: NURSE PRACTITIONER
Payer: MEDICARE

## 2025-02-25 VITALS
OXYGEN SATURATION: 96 % | WEIGHT: 195 LBS | DIASTOLIC BLOOD PRESSURE: 80 MMHG | TEMPERATURE: 96.8 F | RESPIRATION RATE: 15 BRPM | HEART RATE: 83 BPM | HEIGHT: 71 IN | SYSTOLIC BLOOD PRESSURE: 125 MMHG | BODY MASS INDEX: 27.3 KG/M2

## 2025-02-25 DIAGNOSIS — Z79.899 ENCOUNTER FOR LONG-TERM CURRENT USE OF HIGH RISK MEDICATION: ICD-10-CM

## 2025-02-25 DIAGNOSIS — K62.89 RECTAL MASS: ICD-10-CM

## 2025-02-25 LAB

## 2025-02-25 PROCEDURE — 77014 PR CT GUIDANCE PLACEMENT RAD THERAPY FIELDS: CPT | Mod: 26 | Performed by: RADIOLOGY

## 2025-02-25 PROCEDURE — 99212 OFFICE O/P EST SF 10 MIN: CPT | Performed by: NURSE PRACTITIONER

## 2025-02-25 PROCEDURE — 77336 RADIATION PHYSICS CONSULT: CPT | Performed by: RADIOLOGY

## 2025-02-25 PROCEDURE — 99214 OFFICE O/P EST MOD 30 MIN: CPT | Performed by: NURSE PRACTITIONER

## 2025-02-25 PROCEDURE — 77386 HCHG IMRT DELIVERY COMPLEX: CPT | Performed by: RADIOLOGY

## 2025-02-25 RX ORDER — LOSARTAN POTASSIUM AND HYDROCHLOROTHIAZIDE 12.5; 1 MG/1; MG/1
1 TABLET ORAL DAILY
COMMUNITY
Start: 2025-02-06 | End: 2025-03-08

## 2025-02-25 ASSESSMENT — ENCOUNTER SYMPTOMS
WHEEZING: 0
CHILLS: 0
HEADACHES: 0
FEVER: 0
CONSTIPATION: 0
INSOMNIA: 0
DIARRHEA: 0
WEIGHT LOSS: 1
TINGLING: 0
VOMITING: 0
COUGH: 0
PALPITATIONS: 0
DIZZINESS: 0
BLOOD IN STOOL: 1
NAUSEA: 1
MYALGIAS: 0
SHORTNESS OF BREATH: 0

## 2025-02-25 ASSESSMENT — PAIN SCALES - GENERAL: PAINLEVEL_OUTOF10: NO PAIN

## 2025-02-25 ASSESSMENT — FIBROSIS 4 INDEX: FIB4 SCORE: 1.94

## 2025-02-25 NOTE — PROGRESS NOTES
"Subjective     Gage Lyons Jr. is a 59 y.o. male who presents with Follow-Up (2 week fv/ronny)            HPI  Mr. Lyons resents for evaluation prior to week 3 of concurrent chemoradiation with capecitabine for treatment of stage IIa, cT3 N0 M0, moderately differentiated invasive rectal adenocarcinoma: pMMR. He is unaccompanied for today's visit.    Patient was in his usual state of health as he noticed blood in stool and irregular bowel movements that transitioned over approximately 1 year. He was evaluated per colonoscopy, in [place], 10/29/24 with rectal mass noted, biopsy results showed tubular adenoma with high-grade dysplasia, cannot exclude adenocarcinoma. MRI completed 12/27/24 showed cT3 N0 disease. Flexible sigmoidoscopy with repeat biopsy was completed 12/31/24, pathology confirmed malignancy.  He established with medical and radiation oncology and initiated concurrent chemoradiation with capecitabine on 2/10/25.    Patient appears to be tolerating treatment fairly well.  He has some \"queasy\" moments that are best relieved with food intake, he has not taken any of the nausea medication that he has.  Rectal irritation is best managed with Aquaphor at this time.  He is otherwise rating treatment better than he expected and at his baseline.      Review of Systems   Constitutional:  Positive for weight loss (3# - appetite ok). Negative for chills, fever and malaise/fatigue.   Respiratory:  Negative for cough, shortness of breath and wheezing.    Cardiovascular:  Negative for chest pain, palpitations and leg swelling.   Gastrointestinal:  Positive for blood in stool (improving) and nausea (\"queasy\" not taking meds). Negative for constipation (varies w/ diet), diarrhea (varies w/ diet), melena and vomiting.        Aquaphor for irritation   Genitourinary:  Negative for dysuria.   Musculoskeletal:  Positive for joint pain (more achey, all a bit more amplified). Negative for myalgias.   Skin:         Nails " "consistent with baseline   Neurological:  Negative for dizziness, tingling and headaches.   Psychiatric/Behavioral:  The patient does not have insomnia.      No Known Allergies      Current Outpatient Medications on File Prior to Encounter   Medication Sig Dispense Refill    ondansetron (ZOFRAN) 4 MG Tab tablet Take 1 Tablet by mouth every four hours as needed for Nausea/Vomiting. 20 Tablet 3    prochlorperazine (COMPAZINE) 10 MG Tab Take 1 Tablet by mouth every 6 hours as needed for Nausea/Vomiting. 30 Tablet 3    capecitabine (XELODA) 500 MG tablet Take 3 tabs of 500mg strength and 1 tab of 150mg strength (total of 1650 mg) by mouth 2 times a day (at least 12 hrs apart and 30 min after food) for 30 days (Mon-Friday with radiation). 180 Tablet 0    capecitabine (XELODA) 150 MG tablet Take 3 tabs of 500mg strength and 1 tab of 150mg strength (total of 1650 mg) by mouth 2 times a day (at least 12 hrs apart and 30 min after food) for 30 days (Mon-Friday with radiation). 60 Tablet 0    acetaminophen (TYLENOL) 325 MG Tab Take 650 mg by mouth every four hours as needed for Mild Pain.      losartan (COZAAR) 50 MG Tab Take 1 Tablet by mouth every day. (Patient taking differently: Take 100 mg by mouth every day.) 60 Tablet 1    pantoprazole (PROTONIX) 40 MG Tablet Delayed Response Take 40 mg by mouth every day.       No current facility-administered medications on file prior to encounter.              Objective     /80 (BP Location: Right arm, Patient Position: Sitting, BP Cuff Size: Adult)   Pulse 83   Temp 36 °C (96.8 °F) (Temporal)   Resp 15   Ht 1.803 m (5' 10.98\")   Wt 88.5 kg (195 lb)   SpO2 96%   BMI 27.21 kg/m²      Physical Exam  Vitals reviewed.   Constitutional:       General: He is not in acute distress.     Appearance: He is well-developed. He is not diaphoretic.   HENT:      Head: Normocephalic and atraumatic.   Eyes:      General: No scleral icterus.        Right eye: No discharge.         Left " eye: No discharge.   Cardiovascular:      Rate and Rhythm: Normal rate and regular rhythm.      Heart sounds: Normal heart sounds. No murmur heard.     No friction rub. No gallop.   Pulmonary:      Effort: Pulmonary effort is normal. No respiratory distress.      Breath sounds: Normal breath sounds. No wheezing.   Abdominal:      General: There is no distension.      Palpations: Abdomen is soft.      Tenderness: There is no abdominal tenderness.   Musculoskeletal:         General: Normal range of motion.      Cervical back: Normal range of motion.   Skin:     General: Skin is warm and dry.      Coloration: Skin is not pale.      Findings: No erythema or rash.   Neurological:      Mental Status: He is alert and oriented to person, place, and time.   Psychiatric:         Behavior: Behavior normal.          Latest Reference Range & Units 01/17/25 10:37 02/13/25 12:18 02/21/25 12:17   WBC 4.8 - 10.8 K/uL 9.1 (E) 7.0 3.8 (L)   RBC 4.70 - 6.10 M/uL 5.04 (E) 4.64 (L) 4.42 (L)   Hemoglobin 14.0 - 18.0 g/dL 14 (E) 12.7 (L) 12.2 (L)   Hematocrit 42.0 - 52.0 % 43.2 (E) 40.1 (L) 39.7 (L)   MCV 81.4 - 97.8 fL 86 (E) 86.4 89.8   MCH 27.0 - 33.0 pg 27.8 (E) 27.4 27.6   MCHC 32.3 - 36.5 g/dL 32.4 (E) 31.7 (L) 30.7 (L)   RDW 35.9 - 50.0 fL 13 (E) 42.8 45.0   Platelet Count 164 - 446 K/uL 275 (E) 297 176   MPV 9.0 - 12.9 fL  9.8 10.2   Neutrophils-Polys 44.00 - 72.00 % 64 (E) 71.40 64.80   Neutrophils (Absolute) 1.82 - 7.42 K/uL 5.8 (E) 5.01 2.46   Lymphocytes 22.00 - 41.00 % 24 (E) 17.90 (L) 20.50 (L)   Lymphs (Absolute) 1.00 - 4.80 K/uL 2.2 (E) 1.26 0.78 (L)   Monocytes 0.00 - 13.40 % 8 (E) 8.00 11.60   Monos (Absolute) 0.00 - 0.85 K/uL 0.8 (E) 0.56 0.44   Eosinophils 0.00 - 6.90 % 3 (E) 1.40 2.10   Eos (Absolute) 0.00 - 0.51 K/uL 0.3 (E) 0.10 0.08   Basophils 0.00 - 1.80 % 1 (E) 0.90 0.50   Baso (Absolute) 0.00 - 0.12 K/uL 0.1 (E) 0.06 0.02   Immature Granulocytes 0.00 - 0.90 % 0 (E) 0.40 0.50   Immature Granulocytes (abs) 0.00 -  0.11 K/uL 0 (E) 0.03 0.02   Nucleated RBC 0.00 - 0.20 /100 WBC  0.00 0.00   NRBC (Absolute) K/uL  0.00 0.00   Sodium 135 - 145 mmol/L  139 139   Potassium 3.6 - 5.5 mmol/L  4.7 4.4   Chloride 96 - 112 mmol/L  100 103   Co2 20 - 33 mmol/L  28 26   Anion Gap 7.0 - 16.0   11.0 10.0   Glucose 65 - 99 mg/dL  92 95   Bun 8 - 22 mg/dL  13 13   Creatinine 0.50 - 1.40 mg/dL  1.16 1.08   GFR (CKD-EPI) >60 mL/min/1.73 m 2  72 79   Calcium 8.5 - 10.5 mg/dL  9.8 9.7   Correct Calcium 8.5 - 10.5 mg/dL  9.6 9.5   AST(SGOT) 12 - 45 U/L  20 20   ALT(SGPT) 2 - 50 U/L  15 12   Alkaline Phosphatase 30 - 99 U/L  77 68   Total Bilirubin 0.1 - 1.5 mg/dL  0.5 0.6   Albumin 3.2 - 4.9 g/dL  4.3 4.2   Total Protein 6.0 - 8.2 g/dL  7.5 7.2   Globulin 1.9 - 3.5 g/dL  3.2 3.0   A-G Ratio g/dL  1.3 1.4   Glycohemoglobin 4.8 - 5.6 % 5.9 (H) (E)     (L): Data is abnormally low  (H): Data is abnormally high  (E): External lab result         Assessment & Plan     1. Rectal mass        2. Encounter for long-term current use of high risk medication             1.  Rectal cancer: Diagnosed 12/31/24; initiated concurrent chemoXRT w/ [place] 2/10/25    Patient is tolerating treatment fairly well.  CBC, CMP been evaluated and found to be within acceptable limits.  Patient will continue with capecitabine twice daily and daily radiation, and return in 2 weeks for reevaluation, sooner as needed.      The patient verbalized agreement and understanding of current plan. All questions and concerns were addressed at time of visit.    Please note that this dictation was created using voice recognition software. I have made every reasonable attempt to correct obvious errors, but I expect that there are errors of grammar and possibly content that I did not discover before finalizing the note.

## 2025-02-26 ENCOUNTER — HOSPITAL ENCOUNTER (OUTPATIENT)
Dept: RADIATION ONCOLOGY | Facility: MEDICAL CENTER | Age: 60
End: 2025-02-26
Attending: RADIOLOGY
Payer: MEDICARE

## 2025-02-26 ENCOUNTER — HOSPITAL ENCOUNTER (OUTPATIENT)
Dept: RADIATION ONCOLOGY | Facility: MEDICAL CENTER | Age: 60
End: 2025-02-26

## 2025-02-26 VITALS
OXYGEN SATURATION: 99 % | RESPIRATION RATE: 16 BRPM | HEART RATE: 87 BPM | TEMPERATURE: 97.9 F | BODY MASS INDEX: 26.89 KG/M2 | SYSTOLIC BLOOD PRESSURE: 152 MMHG | DIASTOLIC BLOOD PRESSURE: 82 MMHG | WEIGHT: 192.68 LBS

## 2025-02-26 LAB

## 2025-02-26 PROCEDURE — 77386 HCHG IMRT DELIVERY COMPLEX: CPT | Performed by: RADIOLOGY

## 2025-02-26 PROCEDURE — 77014 PR CT GUIDANCE PLACEMENT RAD THERAPY FIELDS: CPT | Mod: 26 | Performed by: RADIOLOGY

## 2025-02-26 ASSESSMENT — FIBROSIS 4 INDEX: FIB4 SCORE: 1.94

## 2025-02-26 ASSESSMENT — PAIN SCALES - GENERAL: PAINLEVEL_OUTOF10: NO PAIN

## 2025-02-26 NOTE — ON TREATMENT VISIT
"ON TREATMENT  NOTE  RADIATION ONCOLOGY DEPARTMENT    Patient name:  Martínez Lyons Jr.    Primary Physician:  Pcp Pt States None MRN: [id number]  CSN: [id number]   Referring physician:  Chriss Soliz M.D.   : 1965, 59 y.o.     ENCOUNTER DATE:  2025      DIAGNOSIS:  Rectal cancer (HCC)  Staging form: Colon and Rectum, AJCC 8th Edition  - Clinical stage from 2025: Stage IIA (cT3, cN0, cM0) - Signed by Marco Andrew M.D. on 2025  Total positive nodes: 0  Histologic grade (G): G2  Histologic grading system: 4 grade system      TREATMENT SUMMARY:  Course First Treatment Date 02/10/2025  Course Last Treatment Date 2025  Radiation Treatments       Active   Plans   Rectum   Most recent treatment: Dose planned: 180 cGy (fraction 12 of 25 on 2025)   Total: Dose planned: 4,500 cGy   Elapsed Days: 16 @ 2025           Historical   No historical radiation treatments to show.                 SUBJECTIVE:  Well appearing    VITAL SIGNS:      2025    11:33 AM 2025    10:47 AM 2025    11:38 AM 2025    12:07 PM 2025    11:12 AM 2025     2:01 PM 2025     9:56 AM   Vitals   SYSTOLIC 152 125 138 144 118 166 174   DIASTOLIC 82 80 67 79 62 90 86   Pulse 87 83 77 78 77 97 100   Temperature 36.6 °C (97.9 °F) 36 °C (96.8 °F) 36.3 °C (97.3 °F) 36.5 °C (97.7 °F) 36 °C (96.8 °F) 36.4 °C (97.6 °F)    Respiration 16 15 18 18  18    Weight 192.68 195 195.99 194.01 198.2 197.75    Height  1.803 m (5' 10.98\")   1.803 m (5' 10.98\") 1.803 m (5' 11\")    BMI 26.89 kg/m2 27.21 kg/m2 27.35 kg/m2 27.07 kg/m2 27.66 kg/m2 27.58 kg/m2    Pulse Oximetry 99 % 96 % 99 % 99 % 97 % 98 %      KPS: 100, Fully active, able to carry on all pre-disease performed without restriction (ECOG equivalent 0)  Encounter Vitals  Temperature: 36.6 °C (97.9 °F)  Blood Pressure: (!) 152/82  Pulse: 87  Respiration: 16  Pulse Oximetry: 99 %  Weight: 87.4 kg (192 lb 10.9 oz)  Weight Source: Stand Up " Scale  Pain Score: No pain      2/26/2025    11:33 AM 2/25/2025    10:47 AM 2/19/2025    11:38 AM 2/12/2025    12:07 PM 1/30/2025    11:12 AM 1/24/2025     2:01 PM   Pain Assessment   Pain Score NO PAIN NO PAIN NO PAIN NO PAIN NO PAIN NO PAIN          PHYSICAL EXAM:  Physical Exam         2/26/2025    11:34 AM 2/19/2025    11:40 AM 2/12/2025    12:09 PM   Toxicity Assessment   Toxicity Assessment Male Pelvis Male Pelvis Male Pelvis   Fatigue (lethargy, malaise, asthenia) Increased fatigue over baseline, but not altering normal activities Increased fatigue over baseline, but not altering normal activities None   Radiation Dermatitis Faint erythema or dry desquamation None None   Anorexia Loss of appetite None None   Colitis None None None   Constipation Requiring stool softener or dietary modification None None   Dehydration None None None   Diarrhea w/o Colostomy None None None   Flatulence None None None   Nausea Able to eat None None   Proctitis None Increased stool frequency, occasional blood-streaked stools or rectal discomfort (including hemorrhoids) not requiring medication None   Vomiting None None None   RT - Pain due to RT None None None   Tumor Pain (onset or exacerbation of tumor pain due to treatment) None None None   Dysuria (painful urination) None None None   Urinary Frequency Normal Normal Normal   Urinary Urgency None None None   Bladder Spasms Absent Absent Absent   Incontinence None None None   Urinary Retention Normal Normal Normal       CURRENT MEDICATIONS:    Current Outpatient Medications:     losartan-hydrochlorothiazide (HYZAAR) 100-12.5 MG per tablet, Take 1 Tablet by mouth every day., Disp: , Rfl:     ondansetron (ZOFRAN) 4 MG Tab tablet, Take 1 Tablet by mouth every four hours as needed for Nausea/Vomiting., Disp: 20 Tablet, Rfl: 3    prochlorperazine (COMPAZINE) 10 MG Tab, Take 1 Tablet by mouth every 6 hours as needed for Nausea/Vomiting., Disp: 30 Tablet, Rfl: 3    capecitabine  (XELODA) 500 MG tablet, Take 3 tabs of 500mg strength and 1 tab of 150mg strength (total of 1650 mg) by mouth 2 times a day (at least 12 hrs apart and 30 min after food) for 30 days (Mon-Friday with radiation)., Disp: 180 Tablet, Rfl: 0    capecitabine (XELODA) 150 MG tablet, Take 3 tabs of 500mg strength and 1 tab of 150mg strength (total of 1650 mg) by mouth 2 times a day (at least 12 hrs apart and 30 min after food) for 30 days (Mon-Friday with radiation)., Disp: 60 Tablet, Rfl: 0    acetaminophen (TYLENOL) 325 MG Tab, Take 650 mg by mouth every four hours as needed for Mild Pain., Disp: , Rfl:     losartan (COZAAR) 50 MG Tab, Take 1 Tablet by mouth every day. (Patient taking differently: Take 100 mg by mouth every day.), Disp: 60 Tablet, Rfl: 1    LABORATORY DATA:   Lab Results   Component Value Date/Time    SODIUM 139 02/21/2025 12:17 PM    POTASSIUM 4.4 02/21/2025 12:17 PM    CHLORIDE 103 02/21/2025 12:17 PM    CO2 26 02/21/2025 12:17 PM    GLUCOSE 95 02/21/2025 12:17 PM    BUN 13 02/21/2025 12:17 PM    CREATININE 1.08 02/21/2025 12:17 PM    CREATININE 1.4 05/20/2009 01:30 AM       Lab Results   Component Value Date/Time    WBC 3.8 (L) 02/21/2025 12:17 PM    RBC 4.42 (L) 02/21/2025 12:17 PM    HEMOGLOBIN 12.2 (L) 02/21/2025 12:17 PM    HEMATOCRIT 39.7 (L) 02/21/2025 12:17 PM    MCV 89.8 02/21/2025 12:17 PM    MCH 27.6 02/21/2025 12:17 PM    MCHC 30.7 (L) 02/21/2025 12:17 PM    PLATELETCT 176 02/21/2025 12:17 PM         RADIOLOGY DATA:  No results found.    IMPRESSION:  Cancer Staging   Rectal cancer (HCC)  Staging form: Colon and Rectum, AJCC 8th Edition  - Clinical stage from 1/24/2025: Stage IIA (cT3, cN0, cM0) - Signed by Marco Andrew M.D. on 1/24/2025      PLAN:  No change in treatment plan    Disposition:  Treatment plan and imaging reviewed. Questions answered. Continue therapy outlined.     Marco Andrew M.D.    No orders of the defined types were placed in this encounter.

## 2025-02-27 ENCOUNTER — APPOINTMENT (OUTPATIENT)
Dept: LAB | Facility: MEDICAL CENTER | Age: 60
End: 2025-02-27
Attending: STUDENT IN AN ORGANIZED HEALTH CARE EDUCATION/TRAINING PROGRAM
Payer: MEDICARE

## 2025-02-27 ENCOUNTER — HOSPITAL ENCOUNTER (OUTPATIENT)
Dept: RADIATION ONCOLOGY | Facility: MEDICAL CENTER | Age: 60
End: 2025-02-27

## 2025-02-27 DIAGNOSIS — C20 RECTAL CANCER (HCC): ICD-10-CM

## 2025-02-27 LAB
ALBUMIN SERPL BCP-MCNC: 4.4 G/DL (ref 3.2–4.9)
ALBUMIN/GLOB SERPL: 1.4 G/DL
ALP SERPL-CCNC: 74 U/L (ref 30–99)
ALT SERPL-CCNC: 14 U/L (ref 2–50)
ANION GAP SERPL CALC-SCNC: 11 MMOL/L (ref 7–16)
AST SERPL-CCNC: 21 U/L (ref 12–45)
BASOPHILS # BLD AUTO: 0.3 % (ref 0–1.8)
BASOPHILS # BLD: 0.01 K/UL (ref 0–0.12)
BILIRUB SERPL-MCNC: 0.8 MG/DL (ref 0.1–1.5)
BUN SERPL-MCNC: 17 MG/DL (ref 8–22)
CALCIUM ALBUM COR SERPL-MCNC: 9.3 MG/DL (ref 8.5–10.5)
CALCIUM SERPL-MCNC: 9.6 MG/DL (ref 8.5–10.5)
CHEMOTHERAPY INFUSION START DATE: NORMAL
CHEMOTHERAPY RECORDS: 1.8 GY
CHEMOTHERAPY RECORDS: 4500 CGY
CHEMOTHERAPY RECORDS: NORMAL
CHEMOTHERAPY RX CANCER: NORMAL
CHLORIDE SERPL-SCNC: 100 MMOL/L (ref 96–112)
CO2 SERPL-SCNC: 27 MMOL/L (ref 20–33)
CREAT SERPL-MCNC: 1.13 MG/DL (ref 0.5–1.4)
DATE 1ST CHEMO CANCER: NORMAL
EOSINOPHIL # BLD AUTO: 0.08 K/UL (ref 0–0.51)
EOSINOPHIL NFR BLD: 2.2 % (ref 0–6.9)
ERYTHROCYTE [DISTWIDTH] IN BLOOD BY AUTOMATED COUNT: 44.8 FL (ref 35.9–50)
GFR SERPLBLD CREATININE-BSD FMLA CKD-EPI: 74 ML/MIN/1.73 M 2
GLOBULIN SER CALC-MCNC: 3.2 G/DL (ref 1.9–3.5)
GLUCOSE SERPL-MCNC: 96 MG/DL (ref 65–99)
HCT VFR BLD AUTO: 39.7 % (ref 42–52)
HGB BLD-MCNC: 12.9 G/DL (ref 14–18)
IMM GRANULOCYTES # BLD AUTO: 0.02 K/UL (ref 0–0.11)
IMM GRANULOCYTES NFR BLD AUTO: 0.6 % (ref 0–0.9)
LYMPHOCYTES # BLD AUTO: 0.61 K/UL (ref 1–4.8)
LYMPHOCYTES NFR BLD: 16.9 % (ref 22–41)
MCH RBC QN AUTO: 28 PG (ref 27–33)
MCHC RBC AUTO-ENTMCNC: 32.5 G/DL (ref 32.3–36.5)
MCV RBC AUTO: 86.1 FL (ref 81.4–97.8)
MONOCYTES # BLD AUTO: 0.51 K/UL (ref 0–0.85)
MONOCYTES NFR BLD AUTO: 14.1 % (ref 0–13.4)
NEUTROPHILS # BLD AUTO: 2.39 K/UL (ref 1.82–7.42)
NEUTROPHILS NFR BLD: 65.9 % (ref 44–72)
NRBC # BLD AUTO: 0 K/UL
NRBC BLD-RTO: 0 /100 WBC (ref 0–0.2)
PLATELET # BLD AUTO: 193 K/UL (ref 164–446)
PMV BLD AUTO: 9.1 FL (ref 9–12.9)
POTASSIUM SERPL-SCNC: 4.4 MMOL/L (ref 3.6–5.5)
PROT SERPL-MCNC: 7.6 G/DL (ref 6–8.2)
RAD ONC ARIA COURSE LAST TREATMENT DATE: NORMAL
RAD ONC ARIA COURSE TREATMENT ELAPSED DAYS: NORMAL
RAD ONC ARIA REFERENCE POINT DOSAGE GIVEN TO DATE: 23.4 GY
RAD ONC ARIA REFERENCE POINT ID: NORMAL
RAD ONC ARIA REFERENCE POINT SESSION DOSAGE GIVEN: 1.8 GY
RBC # BLD AUTO: 4.61 M/UL (ref 4.7–6.1)
SODIUM SERPL-SCNC: 138 MMOL/L (ref 135–145)
WBC # BLD AUTO: 3.6 K/UL (ref 4.8–10.8)

## 2025-02-27 PROCEDURE — 80053 COMPREHEN METABOLIC PANEL: CPT

## 2025-02-27 PROCEDURE — 77386 HCHG IMRT DELIVERY COMPLEX: CPT | Performed by: RADIOLOGY

## 2025-02-27 PROCEDURE — 77014 PR CT GUIDANCE PLACEMENT RAD THERAPY FIELDS: CPT | Mod: 26 | Performed by: RADIOLOGY

## 2025-02-27 PROCEDURE — 36415 COLL VENOUS BLD VENIPUNCTURE: CPT

## 2025-02-27 PROCEDURE — 85025 COMPLETE CBC W/AUTO DIFF WBC: CPT

## 2025-02-28 ENCOUNTER — HOSPITAL ENCOUNTER (OUTPATIENT)
Dept: RADIATION ONCOLOGY | Facility: MEDICAL CENTER | Age: 60
End: 2025-02-28
Payer: MEDICARE

## 2025-02-28 LAB

## 2025-02-28 PROCEDURE — 77386 HCHG IMRT DELIVERY COMPLEX: CPT | Performed by: RADIOLOGY

## 2025-02-28 PROCEDURE — 77014 PR CT GUIDANCE PLACEMENT RAD THERAPY FIELDS: CPT | Mod: 26 | Performed by: RADIOLOGY

## 2025-03-03 ENCOUNTER — HOSPITAL ENCOUNTER (OUTPATIENT)
Dept: RADIATION ONCOLOGY | Facility: MEDICAL CENTER | Age: 60
End: 2025-03-03

## 2025-03-03 ENCOUNTER — HOSPITAL ENCOUNTER (OUTPATIENT)
Dept: RADIATION ONCOLOGY | Facility: MEDICAL CENTER | Age: 60
End: 2025-03-03
Attending: RADIOLOGY
Payer: MEDICARE

## 2025-03-03 LAB

## 2025-03-03 PROCEDURE — 77427 RADIATION TX MANAGEMENT X5: CPT | Performed by: RADIOLOGY

## 2025-03-03 PROCEDURE — 77014 PR CT GUIDANCE PLACEMENT RAD THERAPY FIELDS: CPT | Mod: 26 | Performed by: RADIOLOGY

## 2025-03-03 PROCEDURE — 77386 HCHG IMRT DELIVERY COMPLEX: CPT | Performed by: RADIOLOGY

## 2025-03-04 ENCOUNTER — HOSPITAL ENCOUNTER (OUTPATIENT)
Dept: RADIATION ONCOLOGY | Facility: MEDICAL CENTER | Age: 60
End: 2025-03-04
Payer: MEDICARE

## 2025-03-04 LAB

## 2025-03-04 PROCEDURE — 77336 RADIATION PHYSICS CONSULT: CPT | Performed by: RADIOLOGY

## 2025-03-04 PROCEDURE — 77014 PR CT GUIDANCE PLACEMENT RAD THERAPY FIELDS: CPT | Mod: 26 | Performed by: RADIOLOGY

## 2025-03-04 PROCEDURE — 77386 HCHG IMRT DELIVERY COMPLEX: CPT | Performed by: RADIOLOGY

## 2025-03-05 ENCOUNTER — HOSPITAL ENCOUNTER (OUTPATIENT)
Dept: RADIATION ONCOLOGY | Facility: MEDICAL CENTER | Age: 60
End: 2025-03-05
Attending: RADIOLOGY
Payer: MEDICARE

## 2025-03-05 ENCOUNTER — HOSPITAL ENCOUNTER (OUTPATIENT)
Dept: RADIATION ONCOLOGY | Facility: MEDICAL CENTER | Age: 60
End: 2025-03-05
Payer: MEDICARE

## 2025-03-05 VITALS
RESPIRATION RATE: 17 BRPM | TEMPERATURE: 97.7 F | WEIGHT: 195.55 LBS | DIASTOLIC BLOOD PRESSURE: 85 MMHG | HEART RATE: 89 BPM | SYSTOLIC BLOOD PRESSURE: 144 MMHG | OXYGEN SATURATION: 99 % | BODY MASS INDEX: 27.29 KG/M2

## 2025-03-05 LAB

## 2025-03-05 PROCEDURE — 77014 PR CT GUIDANCE PLACEMENT RAD THERAPY FIELDS: CPT | Mod: 26 | Performed by: RADIOLOGY

## 2025-03-05 PROCEDURE — 77386 HCHG IMRT DELIVERY COMPLEX: CPT | Performed by: RADIOLOGY

## 2025-03-05 ASSESSMENT — FIBROSIS 4 INDEX: FIB4 SCORE: 1.72

## 2025-03-05 ASSESSMENT — PAIN SCALES - GENERAL: PAINLEVEL_OUTOF10: 5=MODERATE PAIN

## 2025-03-05 NOTE — ON TREATMENT VISIT
"ON TREATMENT  NOTE  RADIATION ONCOLOGY DEPARTMENT    Patient name:  Martínez Lyons Jr.    Primary Physician:  Pcp Pt States None MRN: [id number]  CSN: [id number]   Referring physician:  Chriss Soliz M.D.   : 1965, 59 y.o.     ENCOUNTER DATE:  3/5/2025      DIAGNOSIS:  Rectal cancer (HCC)  Staging form: Colon and Rectum, AJCC 8th Edition  - Clinical stage from 2025: Stage IIA (cT3, cN0, cM0) - Signed by Marco Andrew M.D. on 2025  Total positive nodes: 0  Histologic grade (G): G2  Histologic grading system: 4 grade system      TREATMENT SUMMARY:  Course First Treatment Date 02/10/2025  Course Last Treatment Date 2025  Radiation Treatments       Active   Plans   Rectum   Most recent treatment: Dose planned: 180 cGy (fraction 17 of 25 on 3/5/2025)   Total: Dose planned: 4,500 cGy   Elapsed Days:  @ 2025           Historical   No historical radiation treatments to show.                 SUBJECTIVE:  Well appearing    VITAL SIGNS:      3/5/2025    11:54 AM 2025    11:33 AM 2025    10:47 AM 2025    11:38 AM 2025    12:07 PM 2025    11:12 AM 2025     2:01 PM   Vitals   SYSTOLIC 144 152 125 138 144 118 166   DIASTOLIC 85 82 80 67 79 62 90   Pulse 89 87 83 77 78 77 97   Temperature 36.5 °C (97.7 °F) 36.6 °C (97.9 °F) 36 °C (96.8 °F) 36.3 °C (97.3 °F) 36.5 °C (97.7 °F) 36 °C (96.8 °F) 36.4 °C (97.6 °F)   Respiration 17 16 15 18 18  18   Weight 195.55 192.68 195 195.99 194.01 198.2 197.75   Height   1.803 m (5' 10.98\")   1.803 m (5' 10.98\") 1.803 m (5' 11\")   BMI 27.29 kg/m2 26.89 kg/m2 27.21 kg/m2 27.35 kg/m2 27.07 kg/m2 27.66 kg/m2 27.58 kg/m2   Pulse Oximetry 99 % 99 % 96 % 99 % 99 % 97 % 98 %     KPS: 100, Fully active, able to carry on all pre-disease performed without restriction (ECOG equivalent 0)  Encounter Vitals  Temperature: 36.5 °C (97.7 °F)  Blood Pressure: (!) 144/85  Pulse: 89  Respiration: 17  Pulse Oximetry: 99 %  Weight: 88.7 kg (195 lb " 8.8 oz)  Weight Source: Stand Up Scale  Pain Score: 5=Moderate Pain      3/5/2025    11:54 AM 2/26/2025    11:33 AM 2/25/2025    10:47 AM 2/19/2025    11:38 AM 2/12/2025    12:07 PM 1/30/2025    11:12 AM   Pain Assessment   Pain Score 5=MODERATE P NO PAIN NO PAIN NO PAIN NO PAIN NO PAIN   Pain Loc RECTUM               PHYSICAL EXAM:  Physical Exam  Vitals reviewed.              3/5/2025    11:56 AM 2/26/2025    11:34 AM 2/19/2025    11:40 AM 2/12/2025    12:09 PM   Toxicity Assessment   Toxicity Assessment Male Pelvis Male Pelvis Male Pelvis Male Pelvis   Fatigue (lethargy, malaise, asthenia) Increased fatigue over baseline, but not altering normal activities Increased fatigue over baseline, but not altering normal activities Increased fatigue over baseline, but not altering normal activities None   Radiation Dermatitis Faint erythema or dry desquamation Faint erythema or dry desquamation None None   Anorexia Loss of appetite Loss of appetite None None   Colitis None None None None   Constipation None Requiring stool softener or dietary modification None None   Dehydration None None None None   Diarrhea w/o Colostomy None None None None   Flatulence None None None None   Nausea None Able to eat None None   Proctitis None None Increased stool frequency, occasional blood-streaked stools or rectal discomfort (including hemorrhoids) not requiring medication None   Vomiting None None None None   RT - Pain due to RT None None None None   Tumor Pain (onset or exacerbation of tumor pain due to treatment) Mild pain not interfering with function None None None   Dysuria (painful urination) None None None None   Urinary Frequency Increase in frequency up to 2x normal Normal Normal Normal   Urinary Urgency None None None None   Bladder Spasms Absent Absent Absent Absent   Incontinence None None None None   Urinary Retention Normal Normal Normal Normal       CURRENT MEDICATIONS:    Current Outpatient Medications:      losartan-hydrochlorothiazide (HYZAAR) 100-12.5 MG per tablet, Take 1 Tablet by mouth every day., Disp: , Rfl:     ondansetron (ZOFRAN) 4 MG Tab tablet, Take 1 Tablet by mouth every four hours as needed for Nausea/Vomiting., Disp: 20 Tablet, Rfl: 3    prochlorperazine (COMPAZINE) 10 MG Tab, Take 1 Tablet by mouth every 6 hours as needed for Nausea/Vomiting., Disp: 30 Tablet, Rfl: 3    capecitabine (XELODA) 500 MG tablet, Take 3 tabs of 500mg strength and 1 tab of 150mg strength (total of 1650 mg) by mouth 2 times a day (at least 12 hrs apart and 30 min after food) for 30 days (Mon-Friday with radiation)., Disp: 180 Tablet, Rfl: 0    capecitabine (XELODA) 150 MG tablet, Take 3 tabs of 500mg strength and 1 tab of 150mg strength (total of 1650 mg) by mouth 2 times a day (at least 12 hrs apart and 30 min after food) for 30 days (Mon-Friday with radiation)., Disp: 60 Tablet, Rfl: 0    acetaminophen (TYLENOL) 325 MG Tab, Take 650 mg by mouth every four hours as needed for Mild Pain., Disp: , Rfl:     losartan (COZAAR) 50 MG Tab, Take 1 Tablet by mouth every day. (Patient taking differently: Take 100 mg by mouth every day.), Disp: 60 Tablet, Rfl: 1    LABORATORY DATA:   Lab Results   Component Value Date/Time    SODIUM 138 02/27/2025 12:10 PM    POTASSIUM 4.4 02/27/2025 12:10 PM    CHLORIDE 100 02/27/2025 12:10 PM    CO2 27 02/27/2025 12:10 PM    GLUCOSE 96 02/27/2025 12:10 PM    BUN 17 02/27/2025 12:10 PM    CREATININE 1.13 02/27/2025 12:10 PM    CREATININE 1.4 05/20/2009 01:30 AM       Lab Results   Component Value Date/Time    WBC 3.6 (L) 02/27/2025 12:10 PM    RBC 4.61 (L) 02/27/2025 12:10 PM    HEMOGLOBIN 12.9 (L) 02/27/2025 12:10 PM    HEMATOCRIT 39.7 (L) 02/27/2025 12:10 PM    MCV 86.1 02/27/2025 12:10 PM    MCH 28.0 02/27/2025 12:10 PM    MCHC 32.5 02/27/2025 12:10 PM    PLATELETCT 193 02/27/2025 12:10 PM         RADIOLOGY DATA:  No results found.    IMPRESSION:  Cancer Staging   Rectal cancer (HCC)  Staging form:  Colon and Rectum, AJCC 8th Edition  - Clinical stage from 1/24/2025: Stage IIA (cT3, cN0, cM0) - Signed by Marco Andrew M.D. on 1/24/2025      PLAN:  No change in treatment plan    Disposition:  Treatment plan and imaging reviewed. Questions answered. Continue therapy outlined.     Marco Andrew M.D.    No orders of the defined types were placed in this encounter.

## 2025-03-06 ENCOUNTER — HOSPITAL ENCOUNTER (OUTPATIENT)
Dept: LAB | Facility: MEDICAL CENTER | Age: 60
End: 2025-03-06
Attending: STUDENT IN AN ORGANIZED HEALTH CARE EDUCATION/TRAINING PROGRAM
Payer: MEDICARE

## 2025-03-06 ENCOUNTER — HOSPITAL ENCOUNTER (OUTPATIENT)
Dept: RADIATION ONCOLOGY | Facility: MEDICAL CENTER | Age: 60
End: 2025-03-06
Payer: MEDICARE

## 2025-03-06 ENCOUNTER — TELEPHONE (OUTPATIENT)
Dept: RADIATION ONCOLOGY | Facility: MEDICAL CENTER | Age: 60
End: 2025-03-06
Payer: MEDICARE

## 2025-03-06 DIAGNOSIS — C20 RECTAL CANCER (HCC): ICD-10-CM

## 2025-03-06 LAB
ALBUMIN SERPL BCP-MCNC: 4.3 G/DL (ref 3.2–4.9)
ALBUMIN/GLOB SERPL: 1.4 G/DL
ALP SERPL-CCNC: 73 U/L (ref 30–99)
ALT SERPL-CCNC: 13 U/L (ref 2–50)
ANION GAP SERPL CALC-SCNC: 11 MMOL/L (ref 7–16)
AST SERPL-CCNC: 22 U/L (ref 12–45)
BASOPHILS # BLD AUTO: 0.7 % (ref 0–1.8)
BASOPHILS # BLD: 0.03 K/UL (ref 0–0.12)
BILIRUB SERPL-MCNC: 0.6 MG/DL (ref 0.1–1.5)
BUN SERPL-MCNC: 12 MG/DL (ref 8–22)
CALCIUM ALBUM COR SERPL-MCNC: 9.8 MG/DL (ref 8.5–10.5)
CALCIUM SERPL-MCNC: 10 MG/DL (ref 8.5–10.5)
CHEMOTHERAPY INFUSION START DATE: NORMAL
CHEMOTHERAPY RECORDS: 1.8 GY
CHEMOTHERAPY RECORDS: 4500 CGY
CHEMOTHERAPY RECORDS: NORMAL
CHEMOTHERAPY RX CANCER: NORMAL
CHLORIDE SERPL-SCNC: 104 MMOL/L (ref 96–112)
CO2 SERPL-SCNC: 26 MMOL/L (ref 20–33)
CREAT SERPL-MCNC: 1.11 MG/DL (ref 0.5–1.4)
DATE 1ST CHEMO CANCER: NORMAL
EOSINOPHIL # BLD AUTO: 0.09 K/UL (ref 0–0.51)
EOSINOPHIL NFR BLD: 2.1 % (ref 0–6.9)
ERYTHROCYTE [DISTWIDTH] IN BLOOD BY AUTOMATED COUNT: 50.2 FL (ref 35.9–50)
GFR SERPLBLD CREATININE-BSD FMLA CKD-EPI: 76 ML/MIN/1.73 M 2
GLOBULIN SER CALC-MCNC: 3.1 G/DL (ref 1.9–3.5)
GLUCOSE SERPL-MCNC: 100 MG/DL (ref 65–99)
HCT VFR BLD AUTO: 37.5 % (ref 42–52)
HGB BLD-MCNC: 12.6 G/DL (ref 14–18)
IMM GRANULOCYTES # BLD AUTO: 0.01 K/UL (ref 0–0.11)
IMM GRANULOCYTES NFR BLD AUTO: 0.2 % (ref 0–0.9)
LYMPHOCYTES # BLD AUTO: 0.59 K/UL (ref 1–4.8)
LYMPHOCYTES NFR BLD: 13.8 % (ref 22–41)
MCH RBC QN AUTO: 29 PG (ref 27–33)
MCHC RBC AUTO-ENTMCNC: 33.6 G/DL (ref 32.3–36.5)
MCV RBC AUTO: 86.4 FL (ref 81.4–97.8)
MONOCYTES # BLD AUTO: 0.6 K/UL (ref 0–0.85)
MONOCYTES NFR BLD AUTO: 14 % (ref 0–13.4)
NEUTROPHILS # BLD AUTO: 2.97 K/UL (ref 1.82–7.42)
NEUTROPHILS NFR BLD: 69.2 % (ref 44–72)
NRBC # BLD AUTO: 0 K/UL
NRBC BLD-RTO: 0 /100 WBC (ref 0–0.2)
PLATELET # BLD AUTO: 197 K/UL (ref 164–446)
PMV BLD AUTO: 8.9 FL (ref 9–12.9)
POTASSIUM SERPL-SCNC: 4.6 MMOL/L (ref 3.6–5.5)
PROT SERPL-MCNC: 7.4 G/DL (ref 6–8.2)
RAD ONC ARIA COURSE LAST TREATMENT DATE: NORMAL
RAD ONC ARIA COURSE TREATMENT ELAPSED DAYS: NORMAL
RAD ONC ARIA REFERENCE POINT DOSAGE GIVEN TO DATE: 32.4 GY
RAD ONC ARIA REFERENCE POINT ID: NORMAL
RAD ONC ARIA REFERENCE POINT SESSION DOSAGE GIVEN: 1.8 GY
RBC # BLD AUTO: 4.34 M/UL (ref 4.7–6.1)
SODIUM SERPL-SCNC: 141 MMOL/L (ref 135–145)
WBC # BLD AUTO: 4.3 K/UL (ref 4.8–10.8)

## 2025-03-06 PROCEDURE — 77386 HCHG IMRT DELIVERY COMPLEX: CPT | Performed by: RADIOLOGY

## 2025-03-06 PROCEDURE — 85025 COMPLETE CBC W/AUTO DIFF WBC: CPT

## 2025-03-06 PROCEDURE — 36415 COLL VENOUS BLD VENIPUNCTURE: CPT

## 2025-03-06 PROCEDURE — 80053 COMPREHEN METABOLIC PANEL: CPT

## 2025-03-06 PROCEDURE — 77014 PR CT GUIDANCE PLACEMENT RAD THERAPY FIELDS: CPT | Mod: 26 | Performed by: RADIOLOGY

## 2025-03-06 NOTE — TELEPHONE ENCOUNTER
"Nutrition Services: Telephone Encounter   Martínez Lyons Jr. is a 59 y.o. male with diagnosis of rectal cancer     Wt Readings from Last 7 Encounters:   03/05/25 88.7 kg (195 lb 8.8 oz)   02/26/25 87.4 kg (192 lb 10.9 oz)   02/19/25 88.9 kg (195 lb 15.8 oz)   02/12/25 88 kg (194 lb 0.1 oz)   02/25/25 88.5 kg (195 lb)   01/24/25 89.7 kg (197 lb 12 oz)   01/30/25 89.9 kg (198 lb 3.2 oz)     Weight Change: Per Med Onc scale, weight potentially decreased 3 lbs in past ~1 month. This is not considered significant, though worth noting.     Pertinent Recent Lab work (2/27/25): reviewed    RD called twice for nutrition check-in, though pt did not answer. RD unable to leave a voice message as states \"wireless caller is not available\" and does not allow for a message to be left. RD remains available PRN for consultation.      RD available PRN  [phone]    "

## 2025-03-07 ENCOUNTER — HOSPITAL ENCOUNTER (OUTPATIENT)
Dept: RADIATION ONCOLOGY | Facility: MEDICAL CENTER | Age: 60
End: 2025-03-07
Payer: MEDICARE

## 2025-03-07 LAB

## 2025-03-07 PROCEDURE — 77014 PR CT GUIDANCE PLACEMENT RAD THERAPY FIELDS: CPT | Mod: 26 | Performed by: RADIOLOGY

## 2025-03-07 PROCEDURE — 77386 HCHG IMRT DELIVERY COMPLEX: CPT | Performed by: RADIOLOGY

## 2025-03-10 ENCOUNTER — HOSPITAL ENCOUNTER (OUTPATIENT)
Dept: RADIATION ONCOLOGY | Facility: MEDICAL CENTER | Age: 60
End: 2025-03-10

## 2025-03-10 ENCOUNTER — HOSPITAL ENCOUNTER (OUTPATIENT)
Dept: HEMATOLOGY ONCOLOGY | Facility: MEDICAL CENTER | Age: 60
End: 2025-03-10
Attending: STUDENT IN AN ORGANIZED HEALTH CARE EDUCATION/TRAINING PROGRAM
Payer: MEDICARE

## 2025-03-10 VITALS
WEIGHT: 197 LBS | HEIGHT: 71 IN | OXYGEN SATURATION: 96 % | HEART RATE: 75 BPM | BODY MASS INDEX: 27.58 KG/M2 | SYSTOLIC BLOOD PRESSURE: 120 MMHG | DIASTOLIC BLOOD PRESSURE: 62 MMHG | TEMPERATURE: 97.5 F

## 2025-03-10 DIAGNOSIS — C20 RECTAL CANCER (HCC): ICD-10-CM

## 2025-03-10 LAB

## 2025-03-10 PROCEDURE — 77427 RADIATION TX MANAGEMENT X5: CPT | Performed by: RADIOLOGY

## 2025-03-10 PROCEDURE — 77014 PR CT GUIDANCE PLACEMENT RAD THERAPY FIELDS: CPT | Mod: 26 | Performed by: RADIOLOGY

## 2025-03-10 PROCEDURE — 99212 OFFICE O/P EST SF 10 MIN: CPT | Performed by: STUDENT IN AN ORGANIZED HEALTH CARE EDUCATION/TRAINING PROGRAM

## 2025-03-10 PROCEDURE — 77386 HCHG IMRT DELIVERY COMPLEX: CPT | Performed by: RADIOLOGY

## 2025-03-10 PROCEDURE — 99214 OFFICE O/P EST MOD 30 MIN: CPT | Performed by: STUDENT IN AN ORGANIZED HEALTH CARE EDUCATION/TRAINING PROGRAM

## 2025-03-10 RX ORDER — METHYLPREDNISOLONE SODIUM SUCCINATE 125 MG/2ML
125 INJECTION, POWDER, LYOPHILIZED, FOR SOLUTION INTRAMUSCULAR; INTRAVENOUS PRN
OUTPATIENT
Start: 2025-04-07

## 2025-03-10 RX ORDER — 0.9 % SODIUM CHLORIDE 0.9 %
10 VIAL (ML) INJECTION PRN
OUTPATIENT
Start: 2025-04-06

## 2025-03-10 RX ORDER — PROCHLORPERAZINE MALEATE 10 MG
10 TABLET ORAL EVERY 6 HOURS PRN
OUTPATIENT
Start: 2025-04-07

## 2025-03-10 RX ORDER — DIPHENHYDRAMINE HYDROCHLORIDE 50 MG/ML
50 INJECTION, SOLUTION INTRAMUSCULAR; INTRAVENOUS PRN
OUTPATIENT
Start: 2025-04-07

## 2025-03-10 RX ORDER — DEXAMETHASONE SODIUM PHOSPHATE 4 MG/ML
12 INJECTION, SOLUTION INTRA-ARTICULAR; INTRALESIONAL; INTRAMUSCULAR; INTRAVENOUS; SOFT TISSUE ONCE
OUTPATIENT
Start: 2025-04-07 | End: 2025-04-07

## 2025-03-10 RX ORDER — DEXTROSE MONOHYDRATE 50 MG/ML
INJECTION, SOLUTION INTRAVENOUS CONTINUOUS
OUTPATIENT
Start: 2025-04-07

## 2025-03-10 RX ORDER — 0.9 % SODIUM CHLORIDE 0.9 %
3 VIAL (ML) INJECTION PRN
OUTPATIENT
Start: 2025-04-07

## 2025-03-10 RX ORDER — PALONOSETRON 0.05 MG/ML
0.25 INJECTION, SOLUTION INTRAVENOUS ONCE
OUTPATIENT
Start: 2025-04-07 | End: 2025-04-07

## 2025-03-10 RX ORDER — 0.9 % SODIUM CHLORIDE 0.9 %
20 VIAL (ML) INJECTION PRN
OUTPATIENT
Start: 2025-04-09

## 2025-03-10 RX ORDER — 0.9 % SODIUM CHLORIDE 0.9 %
VIAL (ML) INJECTION PRN
OUTPATIENT
Start: 2025-04-06

## 2025-03-10 RX ORDER — ONDANSETRON 2 MG/ML
4 INJECTION INTRAMUSCULAR; INTRAVENOUS PRN
OUTPATIENT
Start: 2025-04-07

## 2025-03-10 RX ORDER — 0.9 % SODIUM CHLORIDE 0.9 %
10 VIAL (ML) INJECTION PRN
OUTPATIENT
Start: 2025-04-07

## 2025-03-10 RX ORDER — 0.9 % SODIUM CHLORIDE 0.9 %
3 VIAL (ML) INJECTION PRN
OUTPATIENT
Start: 2025-04-06

## 2025-03-10 RX ORDER — ONDANSETRON 8 MG/1
8 TABLET, ORALLY DISINTEGRATING ORAL PRN
OUTPATIENT
Start: 2025-04-07

## 2025-03-10 RX ORDER — 0.9 % SODIUM CHLORIDE 0.9 %
VIAL (ML) INJECTION PRN
OUTPATIENT
Start: 2025-04-07

## 2025-03-10 RX ORDER — EPINEPHRINE 1 MG/ML(1)
0.5 AMPUL (ML) INJECTION PRN
OUTPATIENT
Start: 2025-04-07

## 2025-03-10 ASSESSMENT — FIBROSIS 4 INDEX: FIB4 SCORE: 1.83

## 2025-03-10 ASSESSMENT — ENCOUNTER SYMPTOMS
BLURRED VISION: 0
HEARTBURN: 0
BLOOD IN STOOL: 0
ROS GI COMMENTS: RECTAL DISCOMFORT
TINGLING: 0
SPUTUM PRODUCTION: 0
SHORTNESS OF BREATH: 0
DIAPHORESIS: 0
SINUS PAIN: 0
WEIGHT LOSS: 0
MYALGIAS: 0
ABDOMINAL PAIN: 0
SORE THROAT: 0
CONSTIPATION: 0
INSOMNIA: 0
BRUISES/BLEEDS EASILY: 0
COUGH: 0
FEVER: 0
WHEEZING: 0
NAUSEA: 0
HEADACHES: 0
CHILLS: 0
NERVOUS/ANXIOUS: 0
DOUBLE VISION: 0
ORTHOPNEA: 0
DIARRHEA: 0
BACK PAIN: 0
PALPITATIONS: 0
WEAKNESS: 0
DIZZINESS: 0
VOMITING: 0

## 2025-03-10 ASSESSMENT — PAIN SCALES - GENERAL: PAINLEVEL_OUTOF10: NO PAIN

## 2025-03-10 NOTE — PROGRESS NOTES
Follow Up Note:  Hematology/Oncology      Primary Care:  Pcp Pt States None    Diagnosis: Rectal adenocarcinoma    Chief Complaint: On-treatment visit    Current Treatment: Capecitabine with concurrent XRT, followed by FOLFOX and then surgical evaluation    Prior Treatment: NA    Oncology History of Presenting Illness:  Martínez Lyons Jr. is a 59 y.o.  man who presents to the clinic for evaluation for systemic therapy for a new diagnosis of rectal adenocarcinoma. He had been having blood in his stool with irregular bowel habits for 1 year, and finally went to be evaluated. He had never had a colonoscopy before now. His scope revealed a mass in the rectum, and subsequent MRI rectal protocol revealed a cT3N0 disease. Flexible sigmoidoscopy with repeat biopsy revealed invasive moderately differentiated adenocarcinoma, pMMR. He was referred for evaluation accordingly.     Treatment History:   02/10/25: Start capecitabine with concurrent XRT    Interval History:  Patient is here for follow up visit. He is having rectal discomfort but otherwise is doing okay.     Allergies as of 03/10/2025    (No Known Allergies)         Current Outpatient Medications:     ondansetron (ZOFRAN) 4 MG Tab tablet, Take 1 Tablet by mouth every four hours as needed for Nausea/Vomiting., Disp: 20 Tablet, Rfl: 3    prochlorperazine (COMPAZINE) 10 MG Tab, Take 1 Tablet by mouth every 6 hours as needed for Nausea/Vomiting., Disp: 30 Tablet, Rfl: 3    capecitabine (XELODA) 500 MG tablet, Take 3 tabs of 500mg strength and 1 tab of 150mg strength (total of 1650 mg) by mouth 2 times a day (at least 12 hrs apart and 30 min after food) for 30 days (Mon-Friday with radiation)., Disp: 180 Tablet, Rfl: 0    capecitabine (XELODA) 150 MG tablet, Take 3 tabs of 500mg strength and 1 tab of 150mg strength (total of 1650 mg) by mouth 2 times a day (at least 12 hrs apart and 30 min after food) for 30 days (Mon-Friday with radiation)., Disp: 60 Tablet,  "Rfl: 0    acetaminophen (TYLENOL) 325 MG Tab, Take 650 mg by mouth every four hours as needed for Mild Pain., Disp: , Rfl:     losartan (COZAAR) 50 MG Tab, Take 1 Tablet by mouth every day. (Patient taking differently: Take 100 mg by mouth every day.), Disp: 60 Tablet, Rfl: 1      Review of Systems:  Review of Systems   Constitutional:  Negative for chills, diaphoresis, fever, malaise/fatigue and weight loss.   HENT:  Negative for hearing loss, nosebleeds, sinus pain and sore throat.    Eyes:  Negative for blurred vision and double vision.   Respiratory:  Negative for cough, sputum production, shortness of breath and wheezing.    Cardiovascular:  Negative for chest pain, palpitations, orthopnea and leg swelling.   Gastrointestinal:  Negative for abdominal pain, blood in stool, constipation, diarrhea, heartburn, melena, nausea and vomiting.        Rectal discomfort   Genitourinary:  Negative for dysuria, frequency, hematuria and urgency.   Musculoskeletal:  Negative for back pain, joint pain and myalgias.   Skin:  Negative for rash.   Neurological:  Negative for dizziness, tingling, weakness and headaches.   Endo/Heme/Allergies:  Does not bruise/bleed easily.   Psychiatric/Behavioral:  The patient is not nervous/anxious and does not have insomnia.          Physical Exam:  Vitals:    03/10/25 1043   BP: 120/62   Pulse: 75   Temp: 36.4 °C (97.5 °F)   TempSrc: Temporal   SpO2: 96%   Weight: 89.4 kg (197 lb)   Height: 1.803 m (5' 10.98\")       DESC; KARNOFSKY SCALE WITH ECOG EQUIVALENT: 100, Fully active, able to carry on all pre-disease performed without restriction (ECOG equivalent 0)    DISTRESS LEVEL: no apparent distress    Physical Exam  Vitals and nursing note reviewed.   Constitutional:       General: He is awake. He is not in acute distress.     Appearance: Normal appearance. He is normal weight. He is not ill-appearing, toxic-appearing or diaphoretic.   HENT:      Head: Normocephalic and atraumatic.      Nose: " Nose normal. No congestion.      Mouth/Throat:      Pharynx: Oropharynx is clear. No oropharyngeal exudate or posterior oropharyngeal erythema.   Eyes:      General: No scleral icterus.     Extraocular Movements: Extraocular movements intact.      Conjunctiva/sclera: Conjunctivae normal.      Pupils: Pupils are equal, round, and reactive to light.   Cardiovascular:      Rate and Rhythm: Normal rate and regular rhythm.      Pulses: Normal pulses.      Heart sounds: Normal heart sounds. No murmur heard.     No friction rub. No gallop.   Pulmonary:      Effort: Pulmonary effort is normal.      Breath sounds: Normal breath sounds. No decreased air movement. No wheezing, rhonchi or rales.   Abdominal:      General: Bowel sounds are normal. There is no distension.      Tenderness: There is no abdominal tenderness.   Musculoskeletal:         General: No deformity. Normal range of motion.      Cervical back: Normal range of motion and neck supple. No tenderness.      Right lower leg: No edema.      Left lower leg: No edema.   Lymphadenopathy:      Cervical: No cervical adenopathy.      Upper Body:      Right upper body: No axillary adenopathy.      Left upper body: No axillary adenopathy.      Lower Body: No right inguinal adenopathy. No left inguinal adenopathy.   Skin:     General: Skin is warm and dry.      Coloration: Skin is not jaundiced.      Findings: No erythema or rash.   Neurological:      General: No focal deficit present.      Mental Status: He is alert and oriented to person, place, and time.      Sensory: Sensation is intact.      Motor: Motor function is intact. No weakness.      Gait: Gait is intact.   Psychiatric:         Attention and Perception: Attention normal.         Mood and Affect: Mood normal.         Behavior: Behavior normal. Behavior is cooperative.         Thought Content: Thought content normal.         Judgment: Judgment normal.           Labs:  Hospital Outpatient Visit on 03/10/2025    Component Date Value Ref Range Status    Course ID 03/10/2025 [email or web address]   Final    Course Start Date 03/10/2025 01/31/2025   Final    Course First Treatment Date 03/10/2025 02/10/2025   Final    Course Last Treatment Date 03/10/2025 03/10/2025   Final    Course Elapsed Days 03/10/2025 28 @ 03/10/2025   Final    Course Intent 03/10/2025 Curative   Final    RP ID 03/10/2025 Rectum   Final    RP Dosage Given to Date (Gy) 03/10/2025 36  Gy Final    RP Session Dosage Given (Gy) 03/10/2025 1.8  Gy Final    Plan ID 03/10/2025 Rectum   Final    Plan Name 03/10/2025 Rectum   Final    Plan Fractions Treated to Date 03/10/2025 20 of 25   Final    Plan Prescribed Dose Per Fraction * 03/10/2025 1.8  Gy Final    Plan Total Prescribed Dose (cGy) 03/10/2025 4,500  cGy Final   Hospital Outpatient Visit on 03/07/2025   Component Date Value Ref Range Status    Course ID 03/07/2025 [email or web address]   Final    Course Start Date 03/07/2025 01/31/2025   Final    Course First Treatment Date 03/07/2025 02/10/2025   Final    Course Last Treatment Date 03/07/2025 03/07/2025   Final    Course Elapsed Days 03/07/2025 25 @ 03/07/2025   Final    Course Intent 03/07/2025 Curative   Final    RP ID 03/07/2025 Rectum   Final    RP Dosage Given to Date (Gy) 03/07/2025 34.2  Gy Final    RP Session Dosage Given (Gy) 03/07/2025 1.8  Gy Final    Plan ID 03/07/2025 Rectum   Final    Plan Name 03/07/2025 Rectum   Final    Plan Fractions Treated to Date 03/07/2025 19 of 25   Final    Plan Prescribed Dose Per Fraction * 03/07/2025 1.8  Gy Final    Plan Total Prescribed Dose (cGy) 03/07/2025 4,500  cGy Final   Hospital Outpatient Visit on 03/06/2025   Component Date Value Ref Range Status    Sodium 03/06/2025 141  135 - 145 mmol/L Final    Potassium 03/06/2025 4.6  3.6 - 5.5 mmol/L Final    Chloride 03/06/2025 104  96 - 112 mmol/L Final    Co2 03/06/2025 26  20 - 33 mmol/L Final    Anion Gap 03/06/2025 11.0  7.0 - 16.0 Final    Glucose 03/06/2025 100 (H)  65 - 99  mg/dL Final    Bun 03/06/2025 12  8 - 22 mg/dL Final    Creatinine 03/06/2025 1.11  0.50 - 1.40 mg/dL Final    Calcium 03/06/2025 10.0  8.5 - 10.5 mg/dL Final    Correct Calcium 03/06/2025 9.8  8.5 - 10.5 mg/dL Final    AST(SGOT) 03/06/2025 22  12 - 45 U/L Final    ALT(SGPT) 03/06/2025 13  2 - 50 U/L Final    Alkaline Phosphatase 03/06/2025 73  30 - 99 U/L Final    Total Bilirubin 03/06/2025 0.6  0.1 - 1.5 mg/dL Final    Albumin 03/06/2025 4.3  3.2 - 4.9 g/dL Final    Total Protein 03/06/2025 7.4  6.0 - 8.2 g/dL Final    Globulin 03/06/2025 3.1  1.9 - 3.5 g/dL Final    A-G Ratio 03/06/2025 1.4  g/dL Final    WBC 03/06/2025 4.3 (L)  4.8 - 10.8 K/uL Final    RBC 03/06/2025 4.34 (L)  4.70 - 6.10 M/uL Final    Hemoglobin 03/06/2025 12.6 (L)  14.0 - 18.0 g/dL Final    Hematocrit 03/06/2025 37.5 (L)  42.0 - 52.0 % Final    MCV 03/06/2025 86.4  81.4 - 97.8 fL Final    MCH 03/06/2025 29.0  27.0 - 33.0 pg Final    MCHC 03/06/2025 33.6  32.3 - 36.5 g/dL Final    RDW 03/06/2025 50.2 (H)  35.9 - 50.0 fL Final    Platelet Count 03/06/2025 197  164 - 446 K/uL Final    MPV 03/06/2025 8.9 (L)  9.0 - 12.9 fL Final    Neutrophils-Polys 03/06/2025 69.20  44.00 - 72.00 % Final    Lymphocytes 03/06/2025 13.80 (L)  22.00 - 41.00 % Final    Monocytes 03/06/2025 14.00 (H)  0.00 - 13.40 % Final    Eosinophils 03/06/2025 2.10  0.00 - 6.90 % Final    Basophils 03/06/2025 0.70  0.00 - 1.80 % Final    Immature Granulocytes 03/06/2025 0.20  0.00 - 0.90 % Final    Nucleated RBC 03/06/2025 0.00  0.00 - 0.20 /100 WBC Final    Neutrophils (Absolute) 03/06/2025 2.97  1.82 - 7.42 K/uL Final    Includes immature neutrophils, if present.    Lymphs (Absolute) 03/06/2025 0.59 (L)  1.00 - 4.80 K/uL Final    Monos (Absolute) 03/06/2025 0.60  0.00 - 0.85 K/uL Final    Eos (Absolute) 03/06/2025 0.09  0.00 - 0.51 K/uL Final    Baso (Absolute) 03/06/2025 0.03  0.00 - 0.12 K/uL Final    Immature Granulocytes (abs) 03/06/2025 0.01  0.00 - 0.11 K/uL Final    NRBC  (Absolute) 03/06/2025 0.00  K/uL Final    GFR (CKD-EPI) 03/06/2025 76  >60 mL/min/1.73 m 2 Final    Comment: Estimated Glomerular Filtration Rate is calculated using  race neutral CKD-EPI 2021 equation per NKF-ASN recommendations.     Hospital Outpatient Visit on 03/06/2025   Component Date Value Ref Range Status    Course ID 03/06/2025 [email or web address]   Final    Course Start Date 03/06/2025 01/31/2025   Final    Course First Treatment Date 03/06/2025 02/10/2025   Final    Course Last Treatment Date 03/06/2025 03/06/2025   Final    Course Elapsed Days 03/06/2025 24 @ 03/06/2025   Final    Course Intent 03/06/2025 Curative   Final    RP ID 03/06/2025 Rectum   Final    RP Dosage Given to Date (Gy) 03/06/2025 32.4  Gy Final    RP Session Dosage Given (Gy) 03/06/2025 1.8  Gy Final    Plan ID 03/06/2025 Rectum   Final    Plan Name 03/06/2025 Rectum   Final    Plan Fractions Treated to Date 03/06/2025 18 of 25   Final    Plan Prescribed Dose Per Fraction * 03/06/2025 1.8  Gy Final    Plan Total Prescribed Dose (cGy) 03/06/2025 4,500  cGy Final   Orders Only on 03/05/2025   Component Date Value Ref Range Status    Course ID 03/05/2025 [email or web address]   Final    Course Start Date 03/05/2025 01/31/2025   Final    Course First Treatment Date 03/05/2025 02/10/2025   Final    Course Last Treatment Date 03/05/2025 03/05/2025   Final    Course Elapsed Days 03/05/2025 23 @ 03/05/2025   Final    Course Intent 03/05/2025 Curative   Final    RP ID 03/05/2025 Rectum   Final    RP Dosage Given to Date (Gy) 03/05/2025 30.6  Gy Final    RP Session Dosage Given (Gy) 03/05/2025 1.8  Gy Final    Plan ID 03/05/2025 Rectum   Final    Plan Name 03/05/2025 Rectum   Final    Plan Fractions Treated to Date 03/05/2025 17 of 25   Final    Plan Prescribed Dose Per Fraction * 03/05/2025 1.8  Gy Final    Plan Total Prescribed Dose (cGy) 03/05/2025 4,500  cGy Final   Hospital Outpatient Visit on 03/04/2025   Component Date Value Ref Range Status    Course ID  03/04/2025 [email or web address]   Final    Course Start Date 03/04/2025 01/31/2025   Final    Course First Treatment Date 03/04/2025 02/10/2025   Final    Course Last Treatment Date 03/04/2025 03/04/2025   Final    Course Elapsed Days 03/04/2025 22 @ 03/04/2025   Final    Course Intent 03/04/2025 Curative   Final    RP ID 03/04/2025 Rectum   Final    RP Dosage Given to Date (Gy) 03/04/2025 28.8  Gy Final    RP Session Dosage Given (Gy) 03/04/2025 1.8  Gy Final    Plan ID 03/04/2025 Rectum   Final    Plan Name 03/04/2025 Rectum   Final    Plan Fractions Treated to Date 03/04/2025 16 of 25   Final    Plan Prescribed Dose Per Fraction * 03/04/2025 1.8  Gy Final    Plan Total Prescribed Dose (cGy) 03/04/2025 4,500  cGy Final       Imaging:     All listed images below have been independently reviewed by me. I agree with the findings as summarized below:    No results found.    Pathology:  FINAL DIAGNOSIS:     A. Rectal mass, biopsy:          Invasive moderately-differentiated adenocarcinoma (see comment).            Tumor cells demonstrate intact nuclear staining for MLH1, MSH2,           MSH6, and PMS2 by immunostains (MMR proficient).     Comment: Part B of this case has been reviewed by a second pathologist,   Dr. Flores, who concurs with the diagnosis of malignancy                                         Diagnosis performed by:                                       BOB CAMPBELL MD     Assessment & Plan:  1. Rectal cancer (HCC)  IR-CVC PORT PLACEMENT > AGE 5        This is a 59 year old  man with rectal adenocarcinoma, [id number] stage IIA disease, pMMR. He presents for evaluation.      Current Diagnosis and Staging: Rectal adenocarcinoma, [id number] stage IIA disease, pMMR    Update: The patient is doing well at this time. Continue with capecitabine with concurrent XRT.      Treatment Plan: Capecitabine with concurrent XRT, followed by FOLFOX chemotherapy, and then surgical evaluation     Treatment Citation: NCCN      Plan of Care:     Primary Therapy: Capecitabine with concurrent XRT to continue  Supportive Therapy: Antiemetics per protocol  Toxicity: Patient is getting antineoplastic therapy and needs monitoring of blood counts, hepatic function, and renal function due to potential for organ dysfunction.   Labs: CBC with diff, CMP, CEA monitoring. ctDNA monitoring  Imaging: Repeat MRI rectal protocol and CT c/a/p after completion of therapy  Treatment Planning: Patient will proceed with chemoRT utilizing capecitabine, followed by FOLFOX, and then surgical evaluation.   Consultations: Colorectal surgery (Dr. Soliz); Radiation oncology (Dr. Andrew)  Code Status: Full  Miscellaneous: NA  Return for Follow Up: 3-4 weeks for start of FOLFOX    Any questions and concerns raised by the patient were answered to the best of my ability. Thank you for allowing me to participate in the care for this patient. Please feel free to contact me for any questions or concerns.       Total time spent on chart review, clinic encounter, and documentation: 26 minutes.

## 2025-03-11 ENCOUNTER — APPOINTMENT (OUTPATIENT)
Dept: ADMISSIONS | Facility: MEDICAL CENTER | Age: 60
End: 2025-03-11
Attending: STUDENT IN AN ORGANIZED HEALTH CARE EDUCATION/TRAINING PROGRAM
Payer: MEDICARE

## 2025-03-11 ENCOUNTER — PATIENT OUTREACH (OUTPATIENT)
Dept: ONCOLOGY | Facility: MEDICAL CENTER | Age: 60
End: 2025-03-11
Payer: MEDICARE

## 2025-03-11 ENCOUNTER — HOSPITAL ENCOUNTER (OUTPATIENT)
Dept: RADIATION ONCOLOGY | Facility: MEDICAL CENTER | Age: 60
End: 2025-03-11
Payer: MEDICARE

## 2025-03-11 LAB

## 2025-03-11 PROCEDURE — 77014 PR CT GUIDANCE PLACEMENT RAD THERAPY FIELDS: CPT | Mod: 26 | Performed by: RADIOLOGY

## 2025-03-11 PROCEDURE — 77386 HCHG IMRT DELIVERY COMPLEX: CPT | Performed by: RADIOLOGY

## 2025-03-11 NOTE — PROGRESS NOTES
On March 11, 2025, , Mary Ball, received a phone call from pt. asking for gas assistance. AMY Ball informed pt. that [facility] does not have any gas cards to give patients at this time and the soonest we may have them is late April. Pt. stated that he does not believe he has enough gas money to leave on Friday to go back home to [place] and come back to [facility] next week for radiation. Pt. stated that each trip costs him about $75 and he does not have the money in his account at this time. AMY Ball sked pt. if he would fill out an application for the [facility] after his radiation appointment with Dr. Andrew tomorrow and he was agreeable. AMY Ball agreed to see pt. tomorrow (03/12/2025).

## 2025-03-12 ENCOUNTER — HOSPITAL ENCOUNTER (OUTPATIENT)
Dept: RADIATION ONCOLOGY | Facility: MEDICAL CENTER | Age: 60
End: 2025-03-12
Payer: MEDICARE

## 2025-03-12 ENCOUNTER — HOSPITAL ENCOUNTER (OUTPATIENT)
Dept: RADIATION ONCOLOGY | Facility: MEDICAL CENTER | Age: 60
End: 2025-03-12
Attending: RADIOLOGY
Payer: MEDICARE

## 2025-03-12 ENCOUNTER — PATIENT OUTREACH (OUTPATIENT)
Dept: ONCOLOGY | Facility: MEDICAL CENTER | Age: 60
End: 2025-03-12
Payer: MEDICARE

## 2025-03-12 VITALS
WEIGHT: 195.11 LBS | HEART RATE: 81 BPM | RESPIRATION RATE: 16 BRPM | OXYGEN SATURATION: 99 % | TEMPERATURE: 97.5 F | BODY MASS INDEX: 27.22 KG/M2 | DIASTOLIC BLOOD PRESSURE: 81 MMHG | SYSTOLIC BLOOD PRESSURE: 145 MMHG

## 2025-03-12 DIAGNOSIS — C20 RECTAL CANCER (HCC): ICD-10-CM

## 2025-03-12 LAB

## 2025-03-12 PROCEDURE — RXMED WILLOW AMBULATORY MEDICATION CHARGE: Performed by: RADIOLOGY

## 2025-03-12 PROCEDURE — 77014 PR CT GUIDANCE PLACEMENT RAD THERAPY FIELDS: CPT | Mod: 26 | Performed by: RADIOLOGY

## 2025-03-12 PROCEDURE — 77386 HCHG IMRT DELIVERY COMPLEX: CPT | Performed by: RADIOLOGY

## 2025-03-12 RX ORDER — LIDOCAINE 50 MG/G
0.5 OINTMENT TOPICAL 3 TIMES DAILY PRN
Qty: 50 G | Refills: 0 | Status: SHIPPED | OUTPATIENT
Start: 2025-03-12 | End: 2025-04-12

## 2025-03-12 RX ORDER — SILVER SULFADIAZINE 10 MG/G
CREAM TOPICAL
Qty: 400 G | Refills: 2 | Status: SHIPPED | OUTPATIENT
Start: 2025-03-12

## 2025-03-12 ASSESSMENT — FIBROSIS 4 INDEX: FIB4 SCORE: 1.83

## 2025-03-12 ASSESSMENT — PAIN SCALES - GENERAL: PAINLEVEL_OUTOF10: 5=MODERATE PAIN

## 2025-03-12 NOTE — ON TREATMENT VISIT
"ON TREATMENT  NOTE  RADIATION ONCOLOGY DEPARTMENT    Patient name:  Martínez Lyons Jr.    Primary Physician:  Pcp Not In Computer MRN: [id number]  CSN: [id number]   Referring physician:  Chriss Soliz M.D.   : 1965, 59 y.o.     ENCOUNTER DATE:  3/12/2025      DIAGNOSIS:  Rectal cancer (HCC)  Staging form: Colon and Rectum, AJCC 8th Edition  - Clinical stage from 2025: Stage IIA (cT3, cN0, cM0) - Signed by Marco Andrew M.D. on 2025  Total positive nodes: 0  Histologic grade (G): G2  Histologic grading system: 4 grade system      TREATMENT SUMMARY:  Course First Treatment Date 02/10/2025  Course Last Treatment Date 2025  Radiation Treatments       Active   Plans   Rectum   Most recent treatment: Dose planned: 180 cGy (fraction 22 of 25 on 3/12/2025)   Total: Dose planned: 4,500 cGy   Elapsed Days: 30 @ 2025           Historical   No historical radiation treatments to show.                 SUBJECTIVE:  +rectal irritaiton    VITAL SIGNS:      3/12/2025    11:50 AM 3/10/2025    10:43 AM 3/5/2025    11:54 AM 2025    11:33 AM 2025    10:47 AM 2025    11:38 AM 2025    12:07 PM   Vitals   SYSTOLIC 145 120 144 152 125 138 144   DIASTOLIC 81 62 85 82 80 67 79   Pulse 81 75 89 87 83 77 78   Temperature 36.4 °C (97.5 °F) 36.4 °C (97.5 °F) 36.5 °C (97.7 °F) 36.6 °C (97.9 °F) 36 °C (96.8 °F) 36.3 °C (97.3 °F) 36.5 °C (97.7 °F)   Respiration 16  17 16 15 18 18   Weight 195.11 197 195.55 192.68 195 195.99 194.01   Height  1.803 m (5' 10.98\")   1.803 m (5' 10.98\")     BMI 27.22 kg/m2 27.49 kg/m2 27.29 kg/m2 26.89 kg/m2 27.21 kg/m2 27.35 kg/m2 27.07 kg/m2   Pulse Oximetry 99 % 96 % 99 % 99 % 96 % 99 % 99 %     KPS: 100, Fully active, able to carry on all pre-disease performed without restriction (ECOG equivalent 0)  Encounter Vitals  Temperature: 36.4 °C (97.5 °F)  Blood Pressure: (!) 145/81  Pulse: 81  Respiration: 16  Pulse Oximetry: 99 %  Weight: 88.5 kg (195 lb 1.7 " oz)  Weight Source: Stand Up Scale  Pain Score: 5=Moderate Pain      3/12/2025    11:50 AM 3/10/2025    10:43 AM 3/5/2025    11:54 AM 2/26/2025    11:33 AM 2/25/2025    10:47 AM 2/19/2025    11:38 AM   Pain Assessment   Pain Score 5=MODERATE P NO PAIN 5=MODERATE P NO PAIN NO PAIN NO PAIN   Pain Loc RECTUM  RECTUM             PHYSICAL EXAM:  Physical Exam  Constitutional:       Appearance: Normal appearance.   Abdominal:      General: There is no distension.      Palpations: Abdomen is soft.   Skin:     Findings: No erythema.   Neurological:      Mental Status: He is alert.              3/12/2025    11:53 AM 3/5/2025    11:56 AM 2/26/2025    11:34 AM 2/19/2025    11:40 AM 2/12/2025    12:09 PM   Toxicity Assessment   Toxicity Assessment Male Pelvis Male Pelvis Male Pelvis Male Pelvis Male Pelvis   Fatigue (lethargy, malaise, asthenia) Increased fatigue over baseline, but not altering normal activities Increased fatigue over baseline, but not altering normal activities Increased fatigue over baseline, but not altering normal activities Increased fatigue over baseline, but not altering normal activities None   Radiation Dermatitis Moderate to brisk erythema or a patchy moist desquamation, mostly confined to skin folds and creases, with/without moderate edema Faint erythema or dry desquamation Faint erythema or dry desquamation None None   Anorexia Loss of appetite Loss of appetite Loss of appetite None None   Colitis None None None None None   Constipation None None Requiring stool softener or dietary modification None None   Dehydration None None None None None   Diarrhea w/o Colostomy None None None None None   Flatulence None None None None None   Nausea None None Able to eat None None   Proctitis None None None Increased stool frequency, occasional blood-streaked stools or rectal discomfort (including hemorrhoids) not requiring medication None   Vomiting None None None None None   RT - Pain due to RT None None None  None None   Tumor Pain (onset or exacerbation of tumor pain due to treatment) Mild pain not interfering with function Mild pain not interfering with function None None None   Dysuria (painful urination) None None None None None   Urinary Frequency Increase >2x normal but <hourly Increase in frequency up to 2x normal Normal Normal Normal   Urinary Urgency Present None None None None   Bladder Spasms Absent Absent Absent Absent Absent   Incontinence None None None None None   Urinary Retention Hesitency or dribbling, but no significant residual urine and/or retention occuring during the immediate postoperative period Normal Normal Normal Normal       CURRENT MEDICATIONS:    Current Outpatient Medications:     ondansetron (ZOFRAN) 4 MG Tab tablet, Take 1 Tablet by mouth every four hours as needed for Nausea/Vomiting., Disp: 20 Tablet, Rfl: 3    prochlorperazine (COMPAZINE) 10 MG Tab, Take 1 Tablet by mouth every 6 hours as needed for Nausea/Vomiting., Disp: 30 Tablet, Rfl: 3    capecitabine (XELODA) 500 MG tablet, Take 3 tabs of 500mg strength and 1 tab of 150mg strength (total of 1650 mg) by mouth 2 times a day (at least 12 hrs apart and 30 min after food) for 30 days (Mon-Friday with radiation)., Disp: 180 Tablet, Rfl: 0    capecitabine (XELODA) 150 MG tablet, Take 3 tabs of 500mg strength and 1 tab of 150mg strength (total of 1650 mg) by mouth 2 times a day (at least 12 hrs apart and 30 min after food) for 30 days (Mon-Friday with radiation)., Disp: 60 Tablet, Rfl: 0    acetaminophen (TYLENOL) 325 MG Tab, Take 650 mg by mouth every four hours as needed for Mild Pain., Disp: , Rfl:     losartan (COZAAR) 50 MG Tab, Take 1 Tablet by mouth every day. (Patient taking differently: Take 100 mg by mouth every day.), Disp: 60 Tablet, Rfl: 1    LABORATORY DATA:   Lab Results   Component Value Date/Time    SODIUM 141 03/06/2025 12:25 PM    POTASSIUM 4.6 03/06/2025 12:25 PM    CHLORIDE 104 03/06/2025 12:25 PM    CO2 26  03/06/2025 12:25 PM    GLUCOSE 100 (H) 03/06/2025 12:25 PM    BUN 12 03/06/2025 12:25 PM    CREATININE 1.11 03/06/2025 12:25 PM    CREATININE 1.4 05/20/2009 01:30 AM       Lab Results   Component Value Date/Time    WBC 4.3 (L) 03/06/2025 12:25 PM    RBC 4.34 (L) 03/06/2025 12:25 PM    HEMOGLOBIN 12.6 (L) 03/06/2025 12:25 PM    HEMATOCRIT 37.5 (L) 03/06/2025 12:25 PM    MCV 86.4 03/06/2025 12:25 PM    MCH 29.0 03/06/2025 12:25 PM    MCHC 33.6 03/06/2025 12:25 PM    PLATELETCT 197 03/06/2025 12:25 PM         RADIOLOGY DATA:  No results found.    IMPRESSION:  Cancer Staging   Rectal cancer (HCC)  Staging form: Colon and Rectum, AJCC 8th Edition  - Clinical stage from 1/24/2025: Stage IIA (cT3, cN0, cM0) - Signed by Marco Andrew M.D. on 1/24/2025      PLAN:  No change in treatment plan    Disposition:  Treatment plan and imaging reviewed. Questions answered. Continue therapy outlined.     Marco Andrew M.D.    No orders of the defined types were placed in this encounter.

## 2025-03-13 ENCOUNTER — HOSPITAL ENCOUNTER (OUTPATIENT)
Dept: RADIATION ONCOLOGY | Facility: MEDICAL CENTER | Age: 60
End: 2025-03-13
Payer: MEDICARE

## 2025-03-13 ENCOUNTER — PHARMACY VISIT (OUTPATIENT)
Dept: PHARMACY | Facility: MEDICAL CENTER | Age: 60
End: 2025-03-13
Payer: COMMERCIAL

## 2025-03-13 ENCOUNTER — PRE-ADMISSION TESTING (OUTPATIENT)
Dept: ADMISSIONS | Facility: MEDICAL CENTER | Age: 60
End: 2025-03-13
Attending: STUDENT IN AN ORGANIZED HEALTH CARE EDUCATION/TRAINING PROGRAM
Payer: MEDICARE

## 2025-03-13 LAB

## 2025-03-13 PROCEDURE — 77386 HCHG IMRT DELIVERY COMPLEX: CPT | Performed by: RADIOLOGY

## 2025-03-13 PROCEDURE — 77336 RADIATION PHYSICS CONSULT: CPT | Performed by: RADIOLOGY

## 2025-03-13 PROCEDURE — 77338 DESIGN MLC DEVICE FOR IMRT: CPT | Mod: 26 | Performed by: RADIOLOGY

## 2025-03-13 PROCEDURE — 77300 RADIATION THERAPY DOSE PLAN: CPT | Performed by: RADIOLOGY

## 2025-03-13 PROCEDURE — 77338 DESIGN MLC DEVICE FOR IMRT: CPT | Mod: XU | Performed by: RADIOLOGY

## 2025-03-13 PROCEDURE — 77014 PR CT GUIDANCE PLACEMENT RAD THERAPY FIELDS: CPT | Mod: 26 | Performed by: RADIOLOGY

## 2025-03-13 PROCEDURE — 77300 RADIATION THERAPY DOSE PLAN: CPT | Mod: 26 | Performed by: RADIOLOGY

## 2025-03-13 NOTE — PREPROCEDURE INSTRUCTIONS
No answer for 1st attempt to call pt for PAT appointment. Just rang and did not connect to a voicemail.

## 2025-03-14 ENCOUNTER — PATIENT OUTREACH (OUTPATIENT)
Dept: ONCOLOGY | Facility: MEDICAL CENTER | Age: 60
End: 2025-03-14
Payer: MEDICARE

## 2025-03-14 ENCOUNTER — HOSPITAL ENCOUNTER (OUTPATIENT)
Dept: LAB | Facility: MEDICAL CENTER | Age: 60
End: 2025-03-14
Attending: STUDENT IN AN ORGANIZED HEALTH CARE EDUCATION/TRAINING PROGRAM
Payer: MEDICARE

## 2025-03-14 ENCOUNTER — HOSPITAL ENCOUNTER (OUTPATIENT)
Dept: RADIATION ONCOLOGY | Facility: MEDICAL CENTER | Age: 60
End: 2025-03-14
Payer: MEDICARE

## 2025-03-14 DIAGNOSIS — C20 RECTAL CANCER (HCC): ICD-10-CM

## 2025-03-14 LAB
ALBUMIN SERPL BCP-MCNC: 4.1 G/DL (ref 3.2–4.9)
ALBUMIN/GLOB SERPL: 1.4 G/DL
ALP SERPL-CCNC: 67 U/L (ref 30–99)
ALT SERPL-CCNC: 15 U/L (ref 2–50)
ANION GAP SERPL CALC-SCNC: 10 MMOL/L (ref 7–16)
AST SERPL-CCNC: 24 U/L (ref 12–45)
BASOPHILS # BLD AUTO: 0.6 % (ref 0–1.8)
BASOPHILS # BLD: 0.03 K/UL (ref 0–0.12)
BILIRUB SERPL-MCNC: 0.7 MG/DL (ref 0.1–1.5)
BUN SERPL-MCNC: 13 MG/DL (ref 8–22)
CALCIUM ALBUM COR SERPL-MCNC: 9.5 MG/DL (ref 8.5–10.5)
CALCIUM SERPL-MCNC: 9.6 MG/DL (ref 8.5–10.5)
CHEMOTHERAPY INFUSION START DATE: NORMAL
CHEMOTHERAPY RECORDS: 1.8 GY
CHEMOTHERAPY RECORDS: 4500 CGY
CHEMOTHERAPY RECORDS: NORMAL
CHEMOTHERAPY RX CANCER: NORMAL
CHLORIDE SERPL-SCNC: 104 MMOL/L (ref 96–112)
CO2 SERPL-SCNC: 25 MMOL/L (ref 20–33)
CREAT SERPL-MCNC: 1.08 MG/DL (ref 0.5–1.4)
DATE 1ST CHEMO CANCER: NORMAL
EOSINOPHIL # BLD AUTO: 0.07 K/UL (ref 0–0.51)
EOSINOPHIL NFR BLD: 1.5 % (ref 0–6.9)
ERYTHROCYTE [DISTWIDTH] IN BLOOD BY AUTOMATED COUNT: 61.3 FL (ref 35.9–50)
GFR SERPLBLD CREATININE-BSD FMLA CKD-EPI: 79 ML/MIN/1.73 M 2
GLOBULIN SER CALC-MCNC: 3 G/DL (ref 1.9–3.5)
GLUCOSE SERPL-MCNC: 114 MG/DL (ref 65–99)
HCT VFR BLD AUTO: 37.1 % (ref 42–52)
HGB BLD-MCNC: 12 G/DL (ref 14–18)
IMM GRANULOCYTES # BLD AUTO: 0.02 K/UL (ref 0–0.11)
IMM GRANULOCYTES NFR BLD AUTO: 0.4 % (ref 0–0.9)
LYMPHOCYTES # BLD AUTO: 0.51 K/UL (ref 1–4.8)
LYMPHOCYTES NFR BLD: 10.6 % (ref 22–41)
MCH RBC QN AUTO: 29.4 PG (ref 27–33)
MCHC RBC AUTO-ENTMCNC: 32.3 G/DL (ref 32.3–36.5)
MCV RBC AUTO: 90.9 FL (ref 81.4–97.8)
MONOCYTES # BLD AUTO: 0.65 K/UL (ref 0–0.85)
MONOCYTES NFR BLD AUTO: 13.6 % (ref 0–13.4)
NEUTROPHILS # BLD AUTO: 3.51 K/UL (ref 1.82–7.42)
NEUTROPHILS NFR BLD: 73.3 % (ref 44–72)
NRBC # BLD AUTO: 0 K/UL
NRBC BLD-RTO: 0 /100 WBC (ref 0–0.2)
PLATELET # BLD AUTO: 226 K/UL (ref 164–446)
PMV BLD AUTO: 8.8 FL (ref 9–12.9)
POTASSIUM SERPL-SCNC: 4.2 MMOL/L (ref 3.6–5.5)
PROT SERPL-MCNC: 7.1 G/DL (ref 6–8.2)
RAD ONC ARIA COURSE LAST TREATMENT DATE: NORMAL
RAD ONC ARIA COURSE TREATMENT ELAPSED DAYS: NORMAL
RAD ONC ARIA REFERENCE POINT DOSAGE GIVEN TO DATE: 43.2 GY
RAD ONC ARIA REFERENCE POINT ID: NORMAL
RAD ONC ARIA REFERENCE POINT SESSION DOSAGE GIVEN: 1.8 GY
RBC # BLD AUTO: 4.08 M/UL (ref 4.7–6.1)
SODIUM SERPL-SCNC: 139 MMOL/L (ref 135–145)
WBC # BLD AUTO: 4.8 K/UL (ref 4.8–10.8)

## 2025-03-14 PROCEDURE — 85025 COMPLETE CBC W/AUTO DIFF WBC: CPT

## 2025-03-14 PROCEDURE — 77014 PR CT GUIDANCE PLACEMENT RAD THERAPY FIELDS: CPT | Mod: 26 | Performed by: RADIOLOGY

## 2025-03-14 PROCEDURE — 77386 HCHG IMRT DELIVERY COMPLEX: CPT | Performed by: RADIOLOGY

## 2025-03-14 PROCEDURE — 36415 COLL VENOUS BLD VENIPUNCTURE: CPT

## 2025-03-14 PROCEDURE — 80053 COMPREHEN METABOLIC PANEL: CPT

## 2025-03-14 NOTE — PROGRESS NOTES
On March 14, 2025, , Mary Ball, met with pt. and his partner, Tabatha, before their radiation appointment. AMY Ball presented pt. with a check for $1,000.00 from the [facility]. Pt. and Tabatha were very grateful for assistance. AMY Ball informed them that they can use that check on whatever bills they need to pay or necessities they need to purchase and asked them just to keep track of the bills to send to the [facility]. Pt. informed AMY Ball that his father is not doing well and is on hospice. AMY Ball provided active listening and emotional support. Pt. stated that he is headed back home to [place], after radiation to see his father. AMY Ball informed pt. that she will check in on him next week at his radiation.

## 2025-03-14 NOTE — PROGRESS NOTES
On March 14, 2025, , Mary Ball, attempted to call pt. via telephone but he did not respond. AMY Ball called his partner, Tabatha. AMY Ball informed her that she has something for the pt. from [facility] and to please come a few minutes early to his radiation appointment. Tabatha was agreeable.

## 2025-03-14 NOTE — PROGRESS NOTES
On March 12, 2025, , Mary Ball, met with pt. after radiation. Also present was pt.'s long term partner Traci. Pt. and  discussed his current financial situation. Pt. reported that he does not believe he will have enough money to get back from [place] to [place] on Monday to his radiation. AMY Ball and pt. completed a [facility] application for pt. AMY explained the application process. Pt. shared information about his health journey and AMY Ball provided emotional support and active listening. AMY Ball informed pt. that she will turn in the application and call pt. as soon as she gets more information.     On March 12, 2025, , Mary Ball, spoke to pt.'s partner, Tabatha and asked her to have Gage fill out a piece of the [employer] application that was missed at radiation tomorrow and the form will be at the . She agreed to help him fill out the form.

## 2025-03-17 ENCOUNTER — HOSPITAL ENCOUNTER (OUTPATIENT)
Dept: RADIATION ONCOLOGY | Facility: MEDICAL CENTER | Age: 60
End: 2025-03-17
Payer: MEDICARE

## 2025-03-17 LAB

## 2025-03-17 PROCEDURE — 77014 PR CT GUIDANCE PLACEMENT RAD THERAPY FIELDS: CPT | Mod: 26 | Performed by: RADIOLOGY

## 2025-03-17 PROCEDURE — 77427 RADIATION TX MANAGEMENT X5: CPT | Performed by: RADIOLOGY

## 2025-03-17 PROCEDURE — 77386 HCHG IMRT DELIVERY COMPLEX: CPT | Performed by: RADIOLOGY

## 2025-03-18 ENCOUNTER — TELEPHONE (OUTPATIENT)
Dept: HEMATOLOGY ONCOLOGY | Facility: MEDICAL CENTER | Age: 60
End: 2025-03-18
Payer: MEDICARE

## 2025-03-18 LAB
CHEMOTHERAPY INFUSION START DATE: NORMAL
CHEMOTHERAPY RECORDS: 1.8 GY
CHEMOTHERAPY RECORDS: 900 CGY
CHEMOTHERAPY RECORDS: NORMAL
CHEMOTHERAPY RX CANCER: NORMAL
DATE 1ST CHEMO CANCER: NORMAL
RAD ONC ARIA COURSE LAST TREATMENT DATE: NORMAL
RAD ONC ARIA COURSE TREATMENT ELAPSED DAYS: NORMAL
RAD ONC ARIA REFERENCE POINT DOSAGE GIVEN TO DATE: 1.8 GY
RAD ONC ARIA REFERENCE POINT ID: NORMAL
RAD ONC ARIA REFERENCE POINT SESSION DOSAGE GIVEN: 1.8 GY

## 2025-03-18 PROCEDURE — 77386 HCHG IMRT DELIVERY COMPLEX: CPT | Performed by: RADIOLOGY

## 2025-03-18 PROCEDURE — 77014 PR CT GUIDANCE PLACEMENT RAD THERAPY FIELDS: CPT | Mod: 26 | Performed by: RADIOLOGY

## 2025-03-18 NOTE — TELEPHONE ENCOUNTER
Spoke with the Pt and he verified that he has enough fo the current dosage of Capecitabine and does not need a refill.      Sent email to [id number] verifying that they can D/C the med and if the Pt goes on maintenance we will send a new script.

## 2025-03-19 ENCOUNTER — HOSPITAL ENCOUNTER (OUTPATIENT)
Dept: RADIATION ONCOLOGY | Facility: MEDICAL CENTER | Age: 60
End: 2025-03-19
Payer: MEDICARE

## 2025-03-19 ENCOUNTER — HOSPITAL ENCOUNTER (OUTPATIENT)
Dept: RADIATION ONCOLOGY | Facility: MEDICAL CENTER | Age: 60
End: 2025-03-19
Attending: RADIOLOGY
Payer: MEDICARE

## 2025-03-19 VITALS
DIASTOLIC BLOOD PRESSURE: 88 MMHG | HEART RATE: 90 BPM | TEMPERATURE: 97.3 F | OXYGEN SATURATION: 100 % | BODY MASS INDEX: 27.22 KG/M2 | SYSTOLIC BLOOD PRESSURE: 161 MMHG | RESPIRATION RATE: 16 BRPM | WEIGHT: 195.11 LBS

## 2025-03-19 LAB
CHEMOTHERAPY INFUSION START DATE: NORMAL
CHEMOTHERAPY RECORDS: 1.8 GY
CHEMOTHERAPY RECORDS: 900 CGY
CHEMOTHERAPY RECORDS: NORMAL
CHEMOTHERAPY RX CANCER: NORMAL
DATE 1ST CHEMO CANCER: NORMAL
RAD ONC ARIA COURSE LAST TREATMENT DATE: NORMAL
RAD ONC ARIA COURSE TREATMENT ELAPSED DAYS: NORMAL
RAD ONC ARIA REFERENCE POINT DOSAGE GIVEN TO DATE: 3.6 GY
RAD ONC ARIA REFERENCE POINT ID: NORMAL
RAD ONC ARIA REFERENCE POINT SESSION DOSAGE GIVEN: 1.8 GY

## 2025-03-19 PROCEDURE — 77386 HCHG IMRT DELIVERY COMPLEX: CPT | Performed by: RADIOLOGY

## 2025-03-19 PROCEDURE — 77014 PR CT GUIDANCE PLACEMENT RAD THERAPY FIELDS: CPT | Mod: 26 | Performed by: RADIOLOGY

## 2025-03-19 ASSESSMENT — FIBROSIS 4 INDEX: FIB4 SCORE: 1.62

## 2025-03-19 ASSESSMENT — PAIN SCALES - GENERAL: PAINLEVEL_OUTOF10: 6=MODERATE PAIN

## 2025-03-19 NOTE — ON TREATMENT VISIT
"ON TREATMENT  NOTE  RADIATION ONCOLOGY DEPARTMENT    Patient name:  Martínez Lyons Jr.    Primary Physician:  Pcp Not In Computer MRN: [id number]  CSN: [id number]   Referring physician:  Chriss Soliz M.D.   : 1965, 59 y.o.     ENCOUNTER DATE:  3/19/2025      DIAGNOSIS:  Rectal cancer (HCC)  Staging form: Colon and Rectum, AJCC 8th Edition  - Clinical stage from 2025: Stage IIA (cT3, cN0, cM0) - Signed by Marco Andrew M.D. on 2025  Total positive nodes: 0  Histologic grade (G): G2  Histologic grading system: 4 grade system      TREATMENT SUMMARY:  Course First Treatment Date 02/10/2025  Course Last Treatment Date 2025  Radiation Treatments       Active   Plans   Rectum   Most recent treatment: Dose planned: 180 cGy (fraction 25 of 25 on 3/17/2025)   Total: Dose planned: 4,500 cGy   Elapsed Days: 35 @ 2025      Rectum_Bst   Most recent treatment: Dose planned: 180 cGy (fraction 2 of 5 on 3/19/2025)   Total: Dose planned: 900 cGy   Elapsed Days: 37 @ 2025           Historical   No historical radiation treatments to show.                 SUBJECTIVE:  Well appearing    VITAL SIGNS:      3/19/2025    11:50 AM 3/12/2025    11:50 AM 3/10/2025    10:43 AM 3/5/2025    11:54 AM 2025    11:33 AM 2025    10:47 AM 2025    11:38 AM   Vitals   SYSTOLIC 161 145 120 144 152 125 138   DIASTOLIC 88 81 62 85 82 80 67   Pulse 90 81 75 89 87 83 77   Temperature 36.3 °C (97.3 °F) 36.4 °C (97.5 °F) 36.4 °C (97.5 °F) 36.5 °C (97.7 °F) 36.6 °C (97.9 °F) 36 °C (96.8 °F) 36.3 °C (97.3 °F)   Respiration 16 16  17 16 15 18   Weight 195.11 195.11 197 195.55 192.68 195 195.99   Height   1.803 m (5' 10.98\")   1.803 m (5' 10.98\")    BMI 27.22 kg/m2 27.22 kg/m2 27.49 kg/m2 27.29 kg/m2 26.89 kg/m2 27.21 kg/m2 27.35 kg/m2   Pulse Oximetry 100 % 99 % 96 % 99 % 99 % 96 % 99 %     KPS: 100, Fully active, able to carry on all pre-disease performed without restriction (ECOG equivalent " 0)  Encounter Vitals  Temperature: 36.3 °C (97.3 °F)  Blood Pressure: (!) 161/88  Pulse: 90  Respiration: 16  Pulse Oximetry: 100 %  Weight: 88.5 kg (195 lb 1.7 oz)  Weight Source: Stand Up Scale  Pain Score: 6=Moderate Pain      3/19/2025    11:50 AM 3/12/2025    11:50 AM 3/10/2025    10:43 AM 3/5/2025    11:54 AM 2/26/2025    11:33 AM 2/25/2025    10:47 AM   Pain Assessment   Pain Score 6=MODERATE P 5=MODERATE P NO PAIN 5=MODERATE P NO PAIN NO PAIN   Pain Loc RECTUM RECTUM  RECTUM            PHYSICAL EXAM:  Physical Exam         3/19/2025    11:52 AM 3/12/2025    11:53 AM 3/5/2025    11:56 AM 2/26/2025    11:34 AM 2/19/2025    11:40 AM 2/12/2025    12:09 PM   Toxicity Assessment   Toxicity Assessment Male Pelvis Male Pelvis Male Pelvis Male Pelvis Male Pelvis Male Pelvis   Fatigue (lethargy, malaise, asthenia) Increased fatigue over baseline, but not altering normal activities Increased fatigue over baseline, but not altering normal activities Increased fatigue over baseline, but not altering normal activities Increased fatigue over baseline, but not altering normal activities Increased fatigue over baseline, but not altering normal activities None   Radiation Dermatitis Moderate to brisk erythema or a patchy moist desquamation, mostly confined to skin folds and creases, with/without moderate edema Moderate to brisk erythema or a patchy moist desquamation, mostly confined to skin folds and creases, with/without moderate edema Faint erythema or dry desquamation Faint erythema or dry desquamation None None   Anorexia Loss of appetite Loss of appetite Loss of appetite Loss of appetite None None   Colitis None None None None None None   Constipation None None None Requiring stool softener or dietary modification None None   Dehydration None None None None None None   Diarrhea w/o Colostomy Increase of <4 stools/day over pre-treatment None None None None None   Flatulence None None None None None None   Nausea None  None None Able to eat None None   Proctitis None None None None Increased stool frequency, occasional blood-streaked stools or rectal discomfort (including hemorrhoids) not requiring medication None   Vomiting None None None None None None   RT - Pain due to RT None None None None None None   Tumor Pain (onset or exacerbation of tumor pain due to treatment) Mild pain not interfering with function Mild pain not interfering with function Mild pain not interfering with function None None None   Dysuria (painful urination) None None None None None None   Urinary Frequency Increase >2x normal but <hourly Increase >2x normal but <hourly Increase in frequency up to 2x normal Normal Normal Normal   Urinary Urgency Present Present None None None None   Bladder Spasms Absent Absent Absent Absent Absent Absent   Incontinence None None None None None None   Urinary Retention Hesitency or dribbling, but no significant residual urine and/or retention occuring during the immediate postoperative period Hesitency or dribbling, but no significant residual urine and/or retention occuring during the immediate postoperative period Normal Normal Normal Normal       CURRENT MEDICATIONS:    Current Outpatient Medications:     silver sulfADIAZINE (SILVADENE) 1 % Cream, Apply 1/8 inch layer to affected area twice a day, Disp: 400 g, Rfl: 2    lidocaine (XYLOCAINE) 5 % Ointment, Apply 0.5 g topically 3 times a day as needed (anal irriation) for up to 30 days., Disp: 50 g, Rfl: 0    ondansetron (ZOFRAN) 4 MG Tab tablet, Take 1 Tablet by mouth every four hours as needed for Nausea/Vomiting., Disp: 20 Tablet, Rfl: 3    prochlorperazine (COMPAZINE) 10 MG Tab, Take 1 Tablet by mouth every 6 hours as needed for Nausea/Vomiting., Disp: 30 Tablet, Rfl: 3    acetaminophen (TYLENOL) 325 MG Tab, Take 650 mg by mouth every four hours as needed for Mild Pain., Disp: , Rfl:     losartan (COZAAR) 50 MG Tab, Take 1 Tablet by mouth every day. (Patient taking  differently: Take 100 mg by mouth every day.), Disp: 60 Tablet, Rfl: 1    LABORATORY DATA:   Lab Results   Component Value Date/Time    SODIUM 139 03/14/2025 12:22 PM    POTASSIUM 4.2 03/14/2025 12:22 PM    CHLORIDE 104 03/14/2025 12:22 PM    CO2 25 03/14/2025 12:22 PM    GLUCOSE 114 (H) 03/14/2025 12:22 PM    BUN 13 03/14/2025 12:22 PM    CREATININE 1.08 03/14/2025 12:22 PM    CREATININE 1.4 05/20/2009 01:30 AM       Lab Results   Component Value Date/Time    WBC 4.8 03/14/2025 12:22 PM    RBC 4.08 (L) 03/14/2025 12:22 PM    HEMOGLOBIN 12.0 (L) 03/14/2025 12:22 PM    HEMATOCRIT 37.1 (L) 03/14/2025 12:22 PM    MCV 90.9 03/14/2025 12:22 PM    MCH 29.4 03/14/2025 12:22 PM    MCHC 32.3 03/14/2025 12:22 PM    PLATELETCT 226 03/14/2025 12:22 PM         RADIOLOGY DATA:  No results found.    IMPRESSION:  Cancer Staging   Rectal cancer (HCC)  Staging form: Colon and Rectum, AJCC 8th Edition  - Clinical stage from 1/24/2025: Stage IIA (cT3, cN0, cM0) - Signed by Marco Andrew M.D. on 1/24/2025      PLAN:  No change in treatment plan    Disposition:  Treatment plan and imaging reviewed. Questions answered. Continue therapy outlined.     Marco Andrew M.D.    No orders of the defined types were placed in this encounter.

## 2025-03-20 ENCOUNTER — HOSPITAL ENCOUNTER (OUTPATIENT)
Dept: LAB | Facility: MEDICAL CENTER | Age: 60
End: 2025-03-20
Attending: STUDENT IN AN ORGANIZED HEALTH CARE EDUCATION/TRAINING PROGRAM
Payer: MEDICARE

## 2025-03-20 ENCOUNTER — PRE-ADMISSION TESTING (OUTPATIENT)
Dept: ADMISSIONS | Facility: MEDICAL CENTER | Age: 60
End: 2025-03-20
Attending: STUDENT IN AN ORGANIZED HEALTH CARE EDUCATION/TRAINING PROGRAM
Payer: MEDICARE

## 2025-03-20 ENCOUNTER — HOSPITAL ENCOUNTER (OUTPATIENT)
Dept: RADIATION ONCOLOGY | Facility: MEDICAL CENTER | Age: 60
End: 2025-03-20
Payer: MEDICARE

## 2025-03-20 VITALS — BODY MASS INDEX: 27.21 KG/M2 | HEIGHT: 71 IN

## 2025-03-20 DIAGNOSIS — C20 RECTAL CANCER (HCC): ICD-10-CM

## 2025-03-20 LAB
ALBUMIN SERPL BCP-MCNC: 4.3 G/DL (ref 3.2–4.9)
ALBUMIN/GLOB SERPL: 1.3 G/DL
ALP SERPL-CCNC: 69 U/L (ref 30–99)
ALT SERPL-CCNC: 16 U/L (ref 2–50)
ANION GAP SERPL CALC-SCNC: 11 MMOL/L (ref 7–16)
AST SERPL-CCNC: 27 U/L (ref 12–45)
BASOPHILS # BLD AUTO: 0.8 % (ref 0–1.8)
BASOPHILS # BLD: 0.03 K/UL (ref 0–0.12)
BILIRUB SERPL-MCNC: 0.9 MG/DL (ref 0.1–1.5)
BUN SERPL-MCNC: 14 MG/DL (ref 8–22)
CALCIUM ALBUM COR SERPL-MCNC: 9.6 MG/DL (ref 8.5–10.5)
CALCIUM SERPL-MCNC: 9.8 MG/DL (ref 8.5–10.5)
CHEMOTHERAPY INFUSION START DATE: NORMAL
CHEMOTHERAPY RECORDS: 1.8 GY
CHEMOTHERAPY RECORDS: 900 CGY
CHEMOTHERAPY RECORDS: NORMAL
CHEMOTHERAPY RX CANCER: NORMAL
CHLORIDE SERPL-SCNC: 102 MMOL/L (ref 96–112)
CO2 SERPL-SCNC: 26 MMOL/L (ref 20–33)
CREAT SERPL-MCNC: 1.11 MG/DL (ref 0.5–1.4)
DATE 1ST CHEMO CANCER: NORMAL
EOSINOPHIL # BLD AUTO: 0.05 K/UL (ref 0–0.51)
EOSINOPHIL NFR BLD: 1.3 % (ref 0–6.9)
ERYTHROCYTE [DISTWIDTH] IN BLOOD BY AUTOMATED COUNT: 64.7 FL (ref 35.9–50)
GFR SERPLBLD CREATININE-BSD FMLA CKD-EPI: 76 ML/MIN/1.73 M 2
GLOBULIN SER CALC-MCNC: 3.2 G/DL (ref 1.9–3.5)
GLUCOSE SERPL-MCNC: 106 MG/DL (ref 65–99)
HCT VFR BLD AUTO: 37.6 % (ref 42–52)
HGB BLD-MCNC: 12.4 G/DL (ref 14–18)
IMM GRANULOCYTES # BLD AUTO: 0.02 K/UL (ref 0–0.11)
IMM GRANULOCYTES NFR BLD AUTO: 0.5 % (ref 0–0.9)
LYMPHOCYTES # BLD AUTO: 0.39 K/UL (ref 1–4.8)
LYMPHOCYTES NFR BLD: 10.1 % (ref 22–41)
MCH RBC QN AUTO: 29.7 PG (ref 27–33)
MCHC RBC AUTO-ENTMCNC: 33 G/DL (ref 32.3–36.5)
MCV RBC AUTO: 90.2 FL (ref 81.4–97.8)
MONOCYTES # BLD AUTO: 0.5 K/UL (ref 0–0.85)
MONOCYTES NFR BLD AUTO: 12.9 % (ref 0–13.4)
NEUTROPHILS # BLD AUTO: 2.89 K/UL (ref 1.82–7.42)
NEUTROPHILS NFR BLD: 74.4 % (ref 44–72)
NRBC # BLD AUTO: 0 K/UL
NRBC BLD-RTO: 0 /100 WBC (ref 0–0.2)
PLATELET # BLD AUTO: 231 K/UL (ref 164–446)
PMV BLD AUTO: 9 FL (ref 9–12.9)
POTASSIUM SERPL-SCNC: 4.1 MMOL/L (ref 3.6–5.5)
PROT SERPL-MCNC: 7.5 G/DL (ref 6–8.2)
RAD ONC ARIA COURSE LAST TREATMENT DATE: NORMAL
RAD ONC ARIA COURSE TREATMENT ELAPSED DAYS: NORMAL
RAD ONC ARIA REFERENCE POINT DOSAGE GIVEN TO DATE: 5.4 GY
RAD ONC ARIA REFERENCE POINT ID: NORMAL
RAD ONC ARIA REFERENCE POINT SESSION DOSAGE GIVEN: 1.8 GY
RBC # BLD AUTO: 4.17 M/UL (ref 4.7–6.1)
SODIUM SERPL-SCNC: 139 MMOL/L (ref 135–145)
WBC # BLD AUTO: 3.9 K/UL (ref 4.8–10.8)

## 2025-03-20 PROCEDURE — 77386 HCHG IMRT DELIVERY COMPLEX: CPT | Performed by: RADIOLOGY

## 2025-03-20 PROCEDURE — 36415 COLL VENOUS BLD VENIPUNCTURE: CPT

## 2025-03-20 PROCEDURE — 77336 RADIATION PHYSICS CONSULT: CPT | Performed by: RADIOLOGY

## 2025-03-20 PROCEDURE — 80053 COMPREHEN METABOLIC PANEL: CPT

## 2025-03-20 PROCEDURE — 85025 COMPLETE CBC W/AUTO DIFF WBC: CPT

## 2025-03-20 PROCEDURE — 77014 PR CT GUIDANCE PLACEMENT RAD THERAPY FIELDS: CPT | Mod: 26 | Performed by: RADIOLOGY

## 2025-03-20 RX ORDER — LOSARTAN POTASSIUM AND HYDROCHLOROTHIAZIDE 12.5; 1 MG/1; MG/1
1 TABLET ORAL
COMMUNITY
Start: 2025-03-04

## 2025-03-20 NOTE — PREPROCEDURE INSTRUCTIONS
Pt preadmitted via phone, fasting instructions given,map emailed. Questions answered. Placed on fall risk precautions per score on screening tool.

## 2025-03-21 ENCOUNTER — HOSPITAL ENCOUNTER (OUTPATIENT)
Dept: RADIATION ONCOLOGY | Facility: MEDICAL CENTER | Age: 60
End: 2025-03-21
Payer: MEDICARE

## 2025-03-21 LAB
CHEMOTHERAPY INFUSION START DATE: NORMAL
CHEMOTHERAPY RECORDS: 1.8 GY
CHEMOTHERAPY RECORDS: 900 CGY
CHEMOTHERAPY RECORDS: NORMAL
CHEMOTHERAPY RX CANCER: NORMAL
DATE 1ST CHEMO CANCER: NORMAL
RAD ONC ARIA COURSE LAST TREATMENT DATE: NORMAL
RAD ONC ARIA COURSE TREATMENT ELAPSED DAYS: NORMAL
RAD ONC ARIA REFERENCE POINT DOSAGE GIVEN TO DATE: 7.2 GY
RAD ONC ARIA REFERENCE POINT ID: NORMAL
RAD ONC ARIA REFERENCE POINT SESSION DOSAGE GIVEN: 1.8 GY

## 2025-03-21 PROCEDURE — 77014 PR CT GUIDANCE PLACEMENT RAD THERAPY FIELDS: CPT | Mod: 26 | Performed by: RADIOLOGY

## 2025-03-21 PROCEDURE — 77386 HCHG IMRT DELIVERY COMPLEX: CPT | Performed by: RADIOLOGY

## 2025-03-24 ENCOUNTER — HOSPITAL ENCOUNTER (OUTPATIENT)
Dept: HEMATOLOGY ONCOLOGY | Facility: MEDICAL CENTER | Age: 60
End: 2025-03-24
Attending: STUDENT IN AN ORGANIZED HEALTH CARE EDUCATION/TRAINING PROGRAM
Payer: MEDICARE

## 2025-03-24 ENCOUNTER — HOSPITAL ENCOUNTER (OUTPATIENT)
Dept: RADIATION ONCOLOGY | Facility: MEDICAL CENTER | Age: 60
End: 2025-03-24

## 2025-03-24 VITALS
DIASTOLIC BLOOD PRESSURE: 74 MMHG | WEIGHT: 191.2 LBS | TEMPERATURE: 97.3 F | OXYGEN SATURATION: 98 % | HEART RATE: 74 BPM | HEIGHT: 71 IN | BODY MASS INDEX: 26.77 KG/M2 | SYSTOLIC BLOOD PRESSURE: 118 MMHG

## 2025-03-24 DIAGNOSIS — C20 RECTAL CANCER (HCC): ICD-10-CM

## 2025-03-24 LAB
CHEMOTHERAPY INFUSION START DATE: NORMAL
CHEMOTHERAPY INFUSION START DATE: NORMAL
CHEMOTHERAPY INFUSION STOP DATE: NORMAL
CHEMOTHERAPY RECORDS: 1.8 GY
CHEMOTHERAPY RECORDS: 4500 CGY
CHEMOTHERAPY RECORDS: 900 CGY
CHEMOTHERAPY RECORDS: 900 CGY
CHEMOTHERAPY RECORDS: NORMAL
CHEMOTHERAPY RX CANCER: NORMAL
CHEMOTHERAPY RX CANCER: NORMAL
DATE 1ST CHEMO CANCER: NORMAL
DATE 1ST CHEMO CANCER: NORMAL
RAD ONC ARIA COURSE LAST TREATMENT DATE: NORMAL
RAD ONC ARIA COURSE LAST TREATMENT DATE: NORMAL
RAD ONC ARIA COURSE TREATMENT ELAPSED DAYS: NORMAL
RAD ONC ARIA COURSE TREATMENT ELAPSED DAYS: NORMAL
RAD ONC ARIA REFERENCE POINT DOSAGE GIVEN TO DATE: 45 GY
RAD ONC ARIA REFERENCE POINT DOSAGE GIVEN TO DATE: 9 GY
RAD ONC ARIA REFERENCE POINT DOSAGE GIVEN TO DATE: 9 GY
RAD ONC ARIA REFERENCE POINT ID: NORMAL
RAD ONC ARIA REFERENCE POINT SESSION DOSAGE GIVEN: 1.8 GY

## 2025-03-24 PROCEDURE — 99214 OFFICE O/P EST MOD 30 MIN: CPT | Performed by: STUDENT IN AN ORGANIZED HEALTH CARE EDUCATION/TRAINING PROGRAM

## 2025-03-24 PROCEDURE — 99212 OFFICE O/P EST SF 10 MIN: CPT | Performed by: STUDENT IN AN ORGANIZED HEALTH CARE EDUCATION/TRAINING PROGRAM

## 2025-03-24 PROCEDURE — 77014 PR CT GUIDANCE PLACEMENT RAD THERAPY FIELDS: CPT | Mod: 26 | Performed by: RADIOLOGY

## 2025-03-24 PROCEDURE — 77386 HCHG IMRT DELIVERY COMPLEX: CPT | Performed by: RADIOLOGY

## 2025-03-24 PROCEDURE — 77427 RADIATION TX MANAGEMENT X5: CPT | Performed by: RADIOLOGY

## 2025-03-24 ASSESSMENT — ENCOUNTER SYMPTOMS
BACK PAIN: 0
NAUSEA: 0
WEIGHT LOSS: 0
SPUTUM PRODUCTION: 0
NERVOUS/ANXIOUS: 0
SHORTNESS OF BREATH: 0
SINUS PAIN: 0
ROS GI COMMENTS: RECTAL DISCOMFORT
WEAKNESS: 0
ABDOMINAL PAIN: 0
DIARRHEA: 0
FEVER: 0
ORTHOPNEA: 0
BRUISES/BLEEDS EASILY: 0
DOUBLE VISION: 0
HEARTBURN: 0
DIZZINESS: 0
CHILLS: 0
TINGLING: 0
COUGH: 0
DIAPHORESIS: 0
VOMITING: 0
PALPITATIONS: 0
INSOMNIA: 0
CONSTIPATION: 0
BLOOD IN STOOL: 0
WHEEZING: 0
HEADACHES: 0
SORE THROAT: 0
MYALGIAS: 0
BLURRED VISION: 0

## 2025-03-24 ASSESSMENT — PAIN SCALES - GENERAL: PAINLEVEL_OUTOF10: 5=MODERATE PAIN

## 2025-03-24 ASSESSMENT — FIBROSIS 4 INDEX: FIB4 SCORE: 1.72

## 2025-03-24 NOTE — RADIATION COMPLETION NOTES
END OF TREATMENT SUMMARY    Patient name:  Martínez Lyons Jr.    Primary Physician:  Pcp Not In Computer MRN: [id number]  CSN: [id number]   Referring physician:  No ref. provider found  : 1965, 59 y.o.       TREATMENT SUMMARY:        Course First Treatment Date 02/10/2025    Course Last Treatment Date 2025   Course Elapsed Days 42 @ 2025   Course Intent Curative     Radiation Therapy Episodes       Active Episodes       Radiation Therapy: IMRT (2/10/2025)                   Radiation Treatments         Plan Last Treated On Elapsed Days Fractions Treated Prescribed Fraction Dose (cGy) Prescribed Total Dose (cGy)    Rectum 3/17/2025 35 @ 2025 25 of 25 180 4,500    Rectum_Bst 3/24/2025 42 @ 2025 5 of 5 180 900                  Reference Point Last Treated On Elapsed Days Most Recent Session Dose (cGy) Total Dose (cGy)    Rectum 3/17/2025 35 @ 2025 180 4,500    Rectum Boost 3/24/2025 42 @ 2025 180 900                                     STAGE:   Rectal cancer (HCC)  Staging form: Colon and Rectum, AJCC 8th Edition  - Clinical stage from 2025: Stage IIA (cT3, cN0, cM0) - Signed by Marco Andrew M.D. on 2025  Total positive nodes: 0  Histologic grade (G): G2  Histologic grading system: 4 grade system       TREATMENT INDICATION:   ***     CONCURRENT SYSTEMIC TREATMENT:   ***     RT COURSE DISCONTINUED EARLY:   No     PATIENT EXPERIENCE:       3/19/2025    11:52 AM 3/12/2025    11:53 AM 3/5/2025    11:56 AM 2025    11:34 AM 2025    11:40 AM 2025    12:09 PM   Toxicity Assessment   Toxicity Assessment Male Pelvis Male Pelvis Male Pelvis Male Pelvis Male Pelvis Male Pelvis   Fatigue (lethargy, malaise, asthenia) Increased fatigue over baseline, but not altering normal activities Increased fatigue over baseline, but not altering normal activities Increased fatigue over baseline, but not altering normal activities Increased fatigue over baseline, but not  altering normal activities Increased fatigue over baseline, but not altering normal activities None   Radiation Dermatitis Moderate to brisk erythema or a patchy moist desquamation, mostly confined to skin folds and creases, with/without moderate edema Moderate to brisk erythema or a patchy moist desquamation, mostly confined to skin folds and creases, with/without moderate edema Faint erythema or dry desquamation Faint erythema or dry desquamation None None   Anorexia Loss of appetite Loss of appetite Loss of appetite Loss of appetite None None   Colitis None None None None None None   Constipation None None None Requiring stool softener or dietary modification None None   Dehydration None None None None None None   Diarrhea w/o Colostomy Increase of <4 stools/day over pre-treatment None None None None None   Flatulence None None None None None None   Nausea None None None Able to eat None None   Proctitis None None None None Increased stool frequency, occasional blood-streaked stools or rectal discomfort (including hemorrhoids) not requiring medication None   Vomiting None None None None None None   RT - Pain due to RT None None None None None None   Tumor Pain (onset or exacerbation of tumor pain due to treatment) Mild pain not interfering with function Mild pain not interfering with function Mild pain not interfering with function None None None   Dysuria (painful urination) None None None None None None   Urinary Frequency Increase >2x normal but <hourly Increase >2x normal but <hourly Increase in frequency up to 2x normal Normal Normal Normal   Urinary Urgency Present Present None None None None   Bladder Spasms Absent Absent Absent Absent Absent Absent   Incontinence None None None None None None   Urinary Retention Hesitency or dribbling, but no significant residual urine and/or retention occuring during the immediate postoperative period Hesitency or dribbling, but no significant residual urine and/or  "retention occuring during the immediate postoperative period Normal Normal Normal Normal        FOLLOW-UP PLAN:   {avpfollowuplist:01609::\"8 Weeks\"}     COMMENT:          ANATOMIC TARGET SUMMARY    ANATOMIC TARGET MODALITY TECHNIQUE   ***   {RAD ONC MODALITY:66759} {RAD ONC Technique:06202::\"IMRT\"}            COMMENT:         DIAGRAMS:      DOSE VOLUME HISTOGRAMS:            "

## 2025-03-24 NOTE — PROGRESS NOTES
Follow Up Note:  Hematology/Oncology      Primary Care:  Pcp Pt States None    Diagnosis: Rectal adenocarcinoma    Chief Complaint: On-treatment visit    Current Treatment: Capecitabine with concurrent XRT, followed by FOLFOX and then surgical evaluation    Prior Treatment: NA    Oncology History of Presenting Illness:  Martínez Lyons Jr. is a 59 y.o.  man who presents to the clinic for evaluation for systemic therapy for a new diagnosis of rectal adenocarcinoma. He had been having blood in his stool with irregular bowel habits for 1 year, and finally went to be evaluated. He had never had a colonoscopy before now. His scope revealed a mass in the rectum, and subsequent MRI rectal protocol revealed a cT3N0 disease. Flexible sigmoidoscopy with repeat biopsy revealed invasive moderately differentiated adenocarcinoma, pMMR. He was referred for evaluation accordingly.     Treatment History:   02/10/25: Start capecitabine with concurrent XRT    Interval History:  Patient is here for follow up visit. He is glad to be about done with radiation therapy. He is doing about the same. He is ready for chemotherapy.      Allergies as of 03/24/2025    (No Known Allergies)         Current Outpatient Medications:     losartan-hydrochlorothiazide (HYZAAR) 100-12.5 MG per tablet, Take 1 Tablet by mouth every day., Disp: , Rfl:     Capecitabine (XELODA PO), Take 1,600 mg by mouth. Takes on RT days, Disp: , Rfl:     silver sulfADIAZINE (SILVADENE) 1 % Cream, Apply 1/8 inch layer to affected area twice a day, Disp: 400 g, Rfl: 2    lidocaine (XYLOCAINE) 5 % Ointment, Apply 0.5 g topically 3 times a day as needed (anal irriation) for up to 30 days., Disp: 50 g, Rfl: 0    ondansetron (ZOFRAN) 4 MG Tab tablet, Take 1 Tablet by mouth every four hours as needed for Nausea/Vomiting., Disp: 20 Tablet, Rfl: 3    prochlorperazine (COMPAZINE) 10 MG Tab, Take 1 Tablet by mouth every 6 hours as needed for Nausea/Vomiting., Disp: 30  "Tablet, Rfl: 3    acetaminophen (TYLENOL) 325 MG Tab, Take 650 mg by mouth every four hours as needed for Mild Pain., Disp: , Rfl:     losartan (COZAAR) 50 MG Tab, Take 1 Tablet by mouth every day. (Patient not taking: Reported on 3/24/2025), Disp: 60 Tablet, Rfl: 1      Review of Systems:  Review of Systems   Constitutional:  Negative for chills, diaphoresis, fever, malaise/fatigue and weight loss.   HENT:  Negative for hearing loss, nosebleeds, sinus pain and sore throat.    Eyes:  Negative for blurred vision and double vision.   Respiratory:  Negative for cough, sputum production, shortness of breath and wheezing.    Cardiovascular:  Negative for chest pain, palpitations, orthopnea and leg swelling.   Gastrointestinal:  Negative for abdominal pain, blood in stool, constipation, diarrhea, heartburn, melena, nausea and vomiting.        Rectal discomfort   Genitourinary:  Negative for dysuria, frequency, hematuria and urgency.   Musculoskeletal:  Negative for back pain, joint pain and myalgias.   Skin:  Negative for rash.   Neurological:  Negative for dizziness, tingling, weakness and headaches.   Endo/Heme/Allergies:  Does not bruise/bleed easily.   Psychiatric/Behavioral:  The patient is not nervous/anxious and does not have insomnia.          Physical Exam:  Vitals:    03/24/25 1039   BP: 118/74   Pulse: 74   Temp: 36.3 °C (97.3 °F)   TempSrc: Temporal   SpO2: 98%   Weight: 86.7 kg (191 lb 3.2 oz)   Height: 1.803 m (5' 10.98\")       DESC; KARNOFSKY SCALE WITH ECOG EQUIVALENT: 100, Fully active, able to carry on all pre-disease performed without restriction (ECOG equivalent 0)    DISTRESS LEVEL: no apparent distress    Physical Exam  Vitals and nursing note reviewed.   Constitutional:       General: He is awake. He is not in acute distress.     Appearance: Normal appearance. He is normal weight. He is not ill-appearing, toxic-appearing or diaphoretic.   HENT:      Head: Normocephalic and atraumatic.      Nose: " Nose normal. No congestion.      Mouth/Throat:      Pharynx: Oropharynx is clear. No oropharyngeal exudate or posterior oropharyngeal erythema.   Eyes:      General: No scleral icterus.     Extraocular Movements: Extraocular movements intact.      Conjunctiva/sclera: Conjunctivae normal.      Pupils: Pupils are equal, round, and reactive to light.   Cardiovascular:      Rate and Rhythm: Normal rate and regular rhythm.      Pulses: Normal pulses.      Heart sounds: Normal heart sounds. No murmur heard.     No friction rub. No gallop.   Pulmonary:      Effort: Pulmonary effort is normal.      Breath sounds: Normal breath sounds. No decreased air movement. No wheezing, rhonchi or rales.   Abdominal:      General: Bowel sounds are normal. There is no distension.      Tenderness: There is no abdominal tenderness.   Musculoskeletal:         General: No deformity. Normal range of motion.      Cervical back: Normal range of motion and neck supple. No tenderness.      Right lower leg: No edema.      Left lower leg: No edema.   Lymphadenopathy:      Cervical: No cervical adenopathy.      Upper Body:      Right upper body: No axillary adenopathy.      Left upper body: No axillary adenopathy.      Lower Body: No right inguinal adenopathy. No left inguinal adenopathy.   Skin:     General: Skin is warm and dry.      Coloration: Skin is not jaundiced.      Findings: No erythema or rash.   Neurological:      General: No focal deficit present.      Mental Status: He is alert and oriented to person, place, and time.      Sensory: Sensation is intact.      Motor: Motor function is intact. No weakness.      Gait: Gait is intact.   Psychiatric:         Attention and Perception: Attention normal.         Mood and Affect: Mood normal.         Behavior: Behavior normal. Behavior is cooperative.         Thought Content: Thought content normal.         Judgment: Judgment normal.           Labs:  Hospital Outpatient Visit on 03/24/2025    Component Date Value Ref Range Status    Course ID 03/24/2025 C1_Rectum   Final    Course Start Date 03/24/2025 01/31/2025   Final    Course First Treatment Date 03/24/2025 02/10/2025   Final    Course Last Treatment Date 03/24/2025 03/24/2025   Final    Course Elapsed Days 03/24/2025 42 @ 03/24/2025   Final    Course Intent 03/24/2025 Curative   Final    RP ID 03/24/2025 Rectum Boost   Final    RP Dosage Given to Date (Gy) 03/24/2025 9  Gy Final    RP Session Dosage Given (Gy) 03/24/2025 1.8  Gy Final    Plan ID 03/24/2025 Rectum_Bst   Final    Plan Name 03/24/2025 Rectum_Bst   Final    Plan Fractions Treated to Date 03/24/2025 5 of 5   Final    Plan Prescribed Dose Per Fraction * 03/24/2025 1.8  Gy Final    Plan Total Prescribed Dose (cGy) 03/24/2025 900  cGy Final   Hospital Outpatient Visit on 03/21/2025   Component Date Value Ref Range Status    Course ID 03/21/2025 C1_Rectum   Final    Course Start Date 03/21/2025 01/31/2025   Final    Course First Treatment Date 03/21/2025 02/10/2025   Final    Course Last Treatment Date 03/21/2025 03/21/2025   Final    Course Elapsed Days 03/21/2025 39 @ 03/21/2025   Final    Course Intent 03/21/2025 Curative   Final    RP ID 03/21/2025 Rectum Boost   Final    RP Dosage Given to Date (Gy) 03/21/2025 7.2  Gy Final    RP Session Dosage Given (Gy) 03/21/2025 1.8  Gy Final    Plan ID 03/21/2025 Rectum_Bst   Final    Plan Name 03/21/2025 Rectum_Bst   Final    Plan Fractions Treated to Date 03/21/2025 4 of 5   Final    Plan Prescribed Dose Per Fraction * 03/21/2025 1.8  Gy Final    Plan Total Prescribed Dose (cGy) 03/21/2025 900  cGy Final   Hospital Outpatient Visit on 03/20/2025   Component Date Value Ref Range Status    Sodium 03/20/2025 139  135 - 145 mmol/L Final    Potassium 03/20/2025 4.1  3.6 - 5.5 mmol/L Final    Chloride 03/20/2025 102  96 - 112 mmol/L Final    Co2 03/20/2025 26  20 - 33 mmol/L Final    Anion Gap 03/20/2025 11.0  7.0 - 16.0 Final    Glucose 03/20/2025  106 (H)  65 - 99 mg/dL Final    Bun 03/20/2025 14  8 - 22 mg/dL Final    Creatinine 03/20/2025 1.11  0.50 - 1.40 mg/dL Final    Calcium 03/20/2025 9.8  8.5 - 10.5 mg/dL Final    Correct Calcium 03/20/2025 9.6  8.5 - 10.5 mg/dL Final    AST(SGOT) 03/20/2025 27  12 - 45 U/L Final    ALT(SGPT) 03/20/2025 16  2 - 50 U/L Final    Alkaline Phosphatase 03/20/2025 69  30 - 99 U/L Final    Total Bilirubin 03/20/2025 0.9  0.1 - 1.5 mg/dL Final    Albumin 03/20/2025 4.3  3.2 - 4.9 g/dL Final    Total Protein 03/20/2025 7.5  6.0 - 8.2 g/dL Final    Globulin 03/20/2025 3.2  1.9 - 3.5 g/dL Final    A-G Ratio 03/20/2025 1.3  g/dL Final    WBC 03/20/2025 3.9 (L)  4.8 - 10.8 K/uL Final    RBC 03/20/2025 4.17 (L)  4.70 - 6.10 M/uL Final    Hemoglobin 03/20/2025 12.4 (L)  14.0 - 18.0 g/dL Final    Hematocrit 03/20/2025 37.6 (L)  42.0 - 52.0 % Final    MCV 03/20/2025 90.2  81.4 - 97.8 fL Final    MCH 03/20/2025 29.7  27.0 - 33.0 pg Final    MCHC 03/20/2025 33.0  32.3 - 36.5 g/dL Final    RDW 03/20/2025 64.7 (H)  35.9 - 50.0 fL Final    Platelet Count 03/20/2025 231  164 - 446 K/uL Final    MPV 03/20/2025 9.0  9.0 - 12.9 fL Final    Neutrophils-Polys 03/20/2025 74.40 (H)  44.00 - 72.00 % Final    Lymphocytes 03/20/2025 10.10 (L)  22.00 - 41.00 % Final    Monocytes 03/20/2025 12.90  0.00 - 13.40 % Final    Eosinophils 03/20/2025 1.30  0.00 - 6.90 % Final    Basophils 03/20/2025 0.80  0.00 - 1.80 % Final    Immature Granulocytes 03/20/2025 0.50  0.00 - 0.90 % Final    Nucleated RBC 03/20/2025 0.00  0.00 - 0.20 /100 WBC Final    Neutrophils (Absolute) 03/20/2025 2.89  1.82 - 7.42 K/uL Final    Includes immature neutrophils, if present.    Lymphs (Absolute) 03/20/2025 0.39 (L)  1.00 - 4.80 K/uL Final    Monos (Absolute) 03/20/2025 0.50  0.00 - 0.85 K/uL Final    Eos (Absolute) 03/20/2025 0.05  0.00 - 0.51 K/uL Final    Baso (Absolute) 03/20/2025 0.03  0.00 - 0.12 K/uL Final    Immature Granulocytes (abs) 03/20/2025 0.02  0.00 - 0.11 K/uL  Final    NRBC (Absolute) 03/20/2025 0.00  K/uL Final    GFR (CKD-EPI) 03/20/2025 76  >60 mL/min/1.73 m 2 Final    Comment: Estimated Glomerular Filtration Rate is calculated using  race neutral CKD-EPI 2021 equation per NKF-ASN recommendations.     Hospital Outpatient Visit on 03/20/2025   Component Date Value Ref Range Status    Course ID 03/20/2025 C1_Rectum   Final    Course Start Date 03/20/2025 01/31/2025   Final    Course First Treatment Date 03/20/2025 02/10/2025   Final    Course Last Treatment Date 03/20/2025 03/20/2025   Final    Course Elapsed Days 03/20/2025 38 @ 03/20/2025   Final    Course Intent 03/20/2025 Curative   Final    RP ID 03/20/2025 Rectum Boost   Final    RP Dosage Given to Date (Gy) 03/20/2025 5.4  Gy Final    RP Session Dosage Given (Gy) 03/20/2025 1.8  Gy Final    Plan ID 03/20/2025 Rectum_Bst   Final    Plan Name 03/20/2025 Rectum_Bst   Final    Plan Fractions Treated to Date 03/20/2025 3 of 5   Final    Plan Prescribed Dose Per Fraction * 03/20/2025 1.8  Gy Final    Plan Total Prescribed Dose (cGy) 03/20/2025 900  cGy Final   Orders Only on 03/19/2025   Component Date Value Ref Range Status    Course ID 03/19/2025 C1_Rectum   Final    Course Start Date 03/19/2025 01/31/2025   Final    Course First Treatment Date 03/19/2025 02/10/2025   Final    Course Last Treatment Date 03/19/2025 03/19/2025   Final    Course Elapsed Days 03/19/2025 37 @ 03/19/2025   Final    Course Intent 03/19/2025 Curative   Final    RP ID 03/19/2025 Rectum Boost   Final    RP Dosage Given to Date (Gy) 03/19/2025 3.6  Gy Final    RP Session Dosage Given (Gy) 03/19/2025 1.8  Gy Final    Plan ID 03/19/2025 Rectum_Bst   Final    Plan Name 03/19/2025 Rectum_Bst   Final    Plan Fractions Treated to Date 03/19/2025 2 of 5   Final    Plan Prescribed Dose Per Fraction * 03/19/2025 1.8  Gy Final    Plan Total Prescribed Dose (cGy) 03/19/2025 900  cGy Final   Orders Only on 03/18/2025   Component Date Value Ref Range Status     Course ID 03/18/2025 C1_Rectum   Final    Course Start Date 03/18/2025 01/31/2025   Final    Course First Treatment Date 03/18/2025 02/10/2025   Final    Course Last Treatment Date 03/18/2025 03/18/2025   Final    Course Elapsed Days 03/18/2025 36 @ 03/18/2025   Final    Course Intent 03/18/2025 Curative   Final    RP ID 03/18/2025 Rectum Boost   Final    RP Dosage Given to Date (Gy) 03/18/2025 1.8  Gy Final    RP Session Dosage Given (Gy) 03/18/2025 1.8  Gy Final    Plan ID 03/18/2025 Rectum_Bst   Final    Plan Name 03/18/2025 Rectum_Bst   Final    Plan Fractions Treated to Date 03/18/2025 1 of 5   Final    Plan Prescribed Dose Per Fraction * 03/18/2025 1.8  Gy Final    Plan Total Prescribed Dose (cGy) 03/18/2025 900  cGy Final       Imaging:     All listed images below have been independently reviewed by me. I agree with the findings as summarized below:    No results found.    Pathology:  FINAL DIAGNOSIS:     A. Rectal mass, biopsy:          Invasive moderately-differentiated adenocarcinoma (see comment).            Tumor cells demonstrate intact nuclear staining for MLH1, MSH2,           MSH6, and PMS2 by immunostains (MMR proficient).     Comment: Part B of this case has been reviewed by a second pathologist,   Dr. Flores, who concurs with the diagnosis of malignancy                                         Diagnosis performed by:                                       BOB CAMPBELL MD     Assessment & Plan:  1. Rectal cancer (HCC)          This is a 59 year old  man with rectal adenocarcinoma, qQ1P2O7 stage IIA disease, pMMR. He presents for evaluation.      Current Diagnosis and Staging: Rectal adenocarcinoma, [id number] stage IIA disease, pMMR    Update: The patient is doing well at this time. Continue with capecitabine with concurrent XRT. Will start FOLFOX around 04/07/25.      Treatment Plan: Capecitabine with concurrent XRT, followed by FOLFOX chemotherapy, and then surgical evaluation      Treatment Citation: NCCN     Plan of Care:     Primary Therapy: Capecitabine with concurrent XRT to continue. Start FOLFOX around 04/7/25  Supportive Therapy: Antiemetics per protocol  Toxicity: Patient is getting antineoplastic therapy and needs monitoring of blood counts, hepatic function, and renal function due to potential for organ dysfunction.   Labs: CBC with diff, CMP, CEA monitoring. ctDNA monitoring  Imaging: Repeat MRI rectal protocol and CT c/a/p after completion of therapy  Treatment Planning: Patient will proceed with chemoRT utilizing capecitabine, followed by FOLFOX, and then surgical evaluation.   Consultations: Colorectal surgery (Dr. Soliz); Radiation oncology (Dr. Andrew)  Code Status: Full  Miscellaneous: NA  Return for Follow Up: 2 weeks for start of FOLFOX    Any questions and concerns raised by the patient were answered to the best of my ability. Thank you for allowing me to participate in the care for this patient. Please feel free to contact me for any questions or concerns.       Total time spent on chart review, clinic encounter, and documentation: 23 minutes.

## 2025-03-25 ENCOUNTER — APPOINTMENT (OUTPATIENT)
Dept: RADIOLOGY | Facility: MEDICAL CENTER | Age: 60
End: 2025-03-25
Attending: STUDENT IN AN ORGANIZED HEALTH CARE EDUCATION/TRAINING PROGRAM
Payer: MEDICARE

## 2025-03-25 ENCOUNTER — HOSPITAL ENCOUNTER (OUTPATIENT)
Facility: MEDICAL CENTER | Age: 60
End: 2025-03-25
Attending: STUDENT IN AN ORGANIZED HEALTH CARE EDUCATION/TRAINING PROGRAM | Admitting: STUDENT IN AN ORGANIZED HEALTH CARE EDUCATION/TRAINING PROGRAM
Payer: MEDICARE

## 2025-03-25 VITALS
BODY MASS INDEX: 26.85 KG/M2 | HEIGHT: 71 IN | RESPIRATION RATE: 17 BRPM | TEMPERATURE: 97.7 F | OXYGEN SATURATION: 97 % | DIASTOLIC BLOOD PRESSURE: 84 MMHG | HEART RATE: 73 BPM | SYSTOLIC BLOOD PRESSURE: 152 MMHG | WEIGHT: 191.8 LBS

## 2025-03-25 DIAGNOSIS — C20 RECTAL CANCER (HCC): ICD-10-CM

## 2025-03-25 PROCEDURE — 160002 HCHG RECOVERY MINUTES (STAT)

## 2025-03-25 PROCEDURE — C1894 INTRO/SHEATH, NON-LASER: HCPCS

## 2025-03-25 PROCEDURE — 700101 HCHG RX REV CODE 250

## 2025-03-25 PROCEDURE — 160015 HCHG STAT PREOP MINUTES

## 2025-03-25 PROCEDURE — 700111 HCHG RX REV CODE 636 W/ 250 OVERRIDE (IP): Mod: JZ

## 2025-03-25 PROCEDURE — 700111 HCHG RX REV CODE 636 W/ 250 OVERRIDE (IP): Mod: JZ | Performed by: STUDENT IN AN ORGANIZED HEALTH CARE EDUCATION/TRAINING PROGRAM

## 2025-03-25 RX ORDER — LIDOCAINE HYDROCHLORIDE AND EPINEPHRINE 10; 10 MG/ML; UG/ML
INJECTION, SOLUTION INFILTRATION; PERINEURAL
Status: COMPLETED
Start: 2025-03-25 | End: 2025-03-25

## 2025-03-25 RX ORDER — SODIUM CHLORIDE 9 MG/ML
500 INJECTION, SOLUTION INTRAVENOUS
Status: DISCONTINUED | OUTPATIENT
Start: 2025-03-25 | End: 2025-03-25 | Stop reason: HOSPADM

## 2025-03-25 RX ORDER — HEPARIN SODIUM 200 [USP'U]/100ML
INJECTION, SOLUTION INTRAVENOUS
Status: DISCONTINUED
Start: 2025-03-25 | End: 2025-03-25 | Stop reason: HOSPADM

## 2025-03-25 RX ORDER — MIDAZOLAM HYDROCHLORIDE 1 MG/ML
.5-2 INJECTION INTRAMUSCULAR; INTRAVENOUS PRN
Status: DISCONTINUED | OUTPATIENT
Start: 2025-03-25 | End: 2025-03-25 | Stop reason: HOSPADM

## 2025-03-25 RX ORDER — MIDAZOLAM HYDROCHLORIDE 1 MG/ML
INJECTION INTRAMUSCULAR; INTRAVENOUS
Status: COMPLETED
Start: 2025-03-25 | End: 2025-03-25

## 2025-03-25 RX ORDER — ONDANSETRON 2 MG/ML
4 INJECTION INTRAMUSCULAR; INTRAVENOUS PRN
Status: DISCONTINUED | OUTPATIENT
Start: 2025-03-25 | End: 2025-03-25 | Stop reason: HOSPADM

## 2025-03-25 RX ADMIN — MIDAZOLAM HYDROCHLORIDE 1 MG: 1 INJECTION, SOLUTION INTRAMUSCULAR; INTRAVENOUS at 15:22

## 2025-03-25 RX ADMIN — FENTANYL CITRATE 50 MCG: 50 INJECTION, SOLUTION INTRAMUSCULAR; INTRAVENOUS at 15:22

## 2025-03-25 RX ADMIN — FENTANYL CITRATE 50 MCG: 50 INJECTION, SOLUTION INTRAMUSCULAR; INTRAVENOUS at 15:27

## 2025-03-25 RX ADMIN — LIDOCAINE HYDROCHLORIDE,EPINEPHRINE BITARTRATE 20 ML: 10; .01 INJECTION, SOLUTION INFILTRATION; PERINEURAL at 15:23

## 2025-03-25 RX ADMIN — MIDAZOLAM HYDROCHLORIDE 1 MG: 1 INJECTION, SOLUTION INTRAMUSCULAR; INTRAVENOUS at 15:27

## 2025-03-25 ASSESSMENT — PAIN SCALES - GENERAL
PAINLEVEL: 5=MODERATE PAIN
PAINLEVEL: 2=MINIMAL-SLIGHT
PAINLEVEL: 5=MODERATE PAIN
PAINLEVEL: 5=MODERATE PAIN
PAINLEVEL: 2=MINIMAL-SLIGHT
PAINLEVEL: 5=MODERATE PAIN
PAINLEVEL: 5=MODERATE PAIN

## 2025-03-25 ASSESSMENT — PAIN DESCRIPTION - PAIN TYPE: TYPE: CHRONIC PAIN

## 2025-03-25 ASSESSMENT — FIBROSIS 4 INDEX: FIB4 SCORE: 1.72

## 2025-03-25 NOTE — DISCHARGE INSTRUCTIONS
Care After Implanted Port Insertion     The following information offers guidance on how to care for yourself after your procedure. If you have problems or questions, contact your health care provider.     What can I expect after the procedure?     After the procedure, it is common to have:   Discomfort at the port insertion site.   Bruising on the skin over the port. This should improve over 3-4 days.     Follow these instructions at home:     Port care   After your port is placed, you will get a 's information card. The card has information about your port. Keep this card with you at all times.   Take care of the port as told by your health care provider. Ask your health care provider if you or a family member can get training for taking care of the port at home.   Make sure to remember what type of port you have.     Incision care   Wash your hands with soap and water for at least 20 seconds before and after you change your bandage (dressing). If soap and water are not available, use hand .   Leave the initial dressing on for 48-72 hours.   Do not shower while the bandage is in place, only take a sponge bath. If the dressing gets wet or soiled remove it and replace as needed. Once the bandage is removed you may shower, but do not scrub the incision site until it is fully healed. Let warm soapy water run over the site then pat dry.    Leave stitches (sutures), skin glue, or adhesive strips in place. These skin closures may need to stay in place for 2 weeks or longer. If adhesive strip edges start to loosen and curl up, you may trim the loose edges. Do not remove adhesive strips completely unless your health care provider tells you to do that.     Check your port insertion site every day for signs of infection. Check for:   Redness, swelling, or pain.   Fluid or blood.   Warmth.   Pus or a bad smell.     Activity   Return to your normal activities as told by your health care provider. Ask your  health care provider what activities are safe for you.   Avoid lifting anything over 10lbs, any vigorous use of your arms, and any exercises that require your arms above your shoulders or behind your back for 1 week.      General instructions   Take over-the-counter and prescription medicines only as told by your health care provider.   Do not take baths, swim, or use a hot tub until the site is fully healed . You may take a shower once the dressing is removed in 48-72 hours.   Wear a medical alert bracelet in case of an emergency. This will tell any health care providers that you have a port.   Keep all follow-up visits. This is important.     Contact a health care provider if:   You cannot flush your port with saline as directed, or you cannot draw blood from the port.   You have a fever or chills.   You have redness, swelling, or pain around your port insertion site.   You have fluid or blood coming from your port insertion site.   Your port insertion site feels warm to the touch.   You have pus or a bad smell coming from the port insertion site.     Get help right away if:   You have chest pain or shortness of breath.   You have bleeding from your port that you cannot control.   These symptoms may be an emergency. Get help right away. Call 911.       What to Expect Post Sedation    Rest and take it easy for the first 24 hours.  A responsible adult is recommended to remain with you during that time.  It is normal to feel sleepy.  We encourage you to not do anything that requires balance, judgment or coordination.    FOR 24 HOURS DO NOT:  Drive, operate machinery or run household appliances.  Drink beer or alcoholic beverages.  Make important decisions or sign legal documents.    To avoid nausea, slowly advance diet as tolerated, avoiding spicy or greasy foods for the first day.  Add more substantial food to your diet according to your provider's instructions.  INCREASE FLUIDS AND FIBER TO AVOID  CONSTIPATION.    MILD FLU-LIKE SYMPTOMS ARE NORMAL.  YOU MAY EXPERIENCE GENERALIZED MUSCLE ACHES, THROAT IRRITATION, HEADACHE AND/OR SOME NAUSEA.  If any questions arise, call your provider.  If your provider is not available, please feel free to call the [facility] at [phone].    MEDICATIONS: Resume taking daily medication.  Take prescribed pain medication with food.  If no medication is prescribed, you may take non-aspirin pain medication if needed.  PAIN MEDICATION CAN BE VERY CONSTIPATING.  Take a stool softener or laxative such as senokot, pericolace, or milk of magnesia if needed.        Diet    Resume your normal diet as tolerated.  A diet low in cholesterol, fat, and sodium is recommended for good health.       BOWEL FUNCTION:  If you are having problems, use what you normally would or call your provider for suggestions. It also helps to stay regular by including fiber in your diet (for example: bran or fruits and vegetables) and drink plenty of liquids (water, juice, etc.).

## 2025-03-25 NOTE — OR SURGEON
Immediate Post- Operative Note        Findings: Port      Procedure(s): Port placement      Estimated Blood Loss: Less than 5 ml        Complications: None            3/25/2025     3:38 PM     Chriss Dooley M.D.

## 2025-03-25 NOTE — PROGRESS NOTES
Pt presents to IR. Patient was consented by MD at bedside, confirmed by this RN and consent at bedside. Pt transferred to IR 1 table in supine position. Patient underwent a right sided port placement by Dr. Dooley. Procedure site was marked by MD and verified using imaging guidance. Pt placed on monitor, prepped and draped in a sterile fashion. Vitals were taken every 5 minutes and remained stable during procedure (see doc flow sheet for results). CO2 waveform capnography was monitored and remained WNL throughout procedure. Right port site and IJ site dressed with dermabond, steri-strips, gauze, and tegaderm; dressings clean, dry, and intact. Port aspirates and flushes; blocked with saline per hospital policy. Report called to JAVAN Aquino. Pt transported by stretcher with RN to PPU 5.     PowerPort ClearVUE Implantable Port  Single lumen, 8F  REF: [id number]  LOT: [id number]  EXP: 2026-02-28

## 2025-03-25 NOTE — PROGRESS NOTES
Pt arrived at 1550. RN received report from IR RN. R chest incision site CDI, covered with guaze and tegaderm. Pt tolerated PO fluids and food.   1601: Pt's significant other, Tabatha, at bedside.  1624: RN went over d/c instructions. All questions/concerns answered.   1628: Pt d/c. Pt ambulated without assistance. RN removed IV and ID band

## 2025-04-02 NOTE — PROGRESS NOTES
"Pharmacy Chemotherapy Calculations    Dx: Rectal adenocarcinoma  Cycle 1  Previous treatment = capecitabine + XRT started 2/2025    Protocol: mFOLFOX6  Oxaliplatin 85 g/m2 IV over 2 hours concurrent with  Leucovorin 400 mg/m2 IV over 2 hours followed by  Fluorouracil 400 mg/m2 IV push followed by  Fluorouracil 2400 mg/m2 CIVI over 46 hours   14-day cycle until DP/UT  NCCN Guidelines for Rectal Cancer V.1.2024.  Efrain BAKER. et al. BMC Cancer. 2007:7:91.9  Alan SL, et al. Br J Cancer. 2002:87(4):393-9.  George AP, et al. NICHOLAS. 2017:317(23):1796-7510.    Allergies:  Patient has no known allergies.       /79   Pulse 97   Temp 36.3 °C (97.4 °F) (Temporal)   Resp 18   Ht 1.803 m (5' 11\")   Wt 86 kg (189 lb 9.5 oz)   SpO2 98%   BMI 26.44 kg/m²  Body surface area is 2.08 meters squared.    Labs 4/7/25:  ANC~ 3890 Plt = 208k   Hgb = 11.6     SCr = 0.99 mg/dL CrCl ~ 96 mL/min   AST/ALT/AP = 21/13/67 TBili = 0.5     Oxaliplatin 85 mg/m² x 2.080 m² = 176.8 mg   <10% difference, OK to treat with final dose = 180 mg    Leucovorin 400 mg/m² x 2.08 m² = 832 mg   <10% difference, OK to treat with final dose = 850 mg    Fluorouracil 400 mg/m² x 2.08 m² = 832 mg   <10% difference, OK to treat with final dose = 850 mg    Fluorouracil 2400 mg/m² x 2.08 m² = 4992 mg  <10% difference, OK to treat with final dose = 5350 mg CIVI over 46 hours at 2.3 mL/hr    Joao Petty, PharmD  "

## 2025-04-03 ENCOUNTER — TELEPHONE (OUTPATIENT)
Dept: RADIATION ONCOLOGY | Facility: MEDICAL CENTER | Age: 60
End: 2025-04-03
Payer: MEDICARE

## 2025-04-03 NOTE — TELEPHONE ENCOUNTER
"Phone call conversation with patient reporting 1 week of intense burning with BM's as well as the presence of \"blood clots\" when using a wet wipe. Patient reports no dizziness or lightheadedness reports monitoring his BP regularly at home without any noticeable changes. He states he is \"not hemorrhaging\" and wants to confirm that these symptoms are \"normal\", he mentions that the bleeding is similar to how he presented in the beginning. Dr. Emery recommends forwarding the message to Dr. Andrew when he returns to office tomorrow 04/04/2025 so long as patient isn't symptomatic.   "

## 2025-04-07 ENCOUNTER — OUTPATIENT INFUSION SERVICES (OUTPATIENT)
Dept: ONCOLOGY | Facility: MEDICAL CENTER | Age: 60
End: 2025-04-07
Attending: STUDENT IN AN ORGANIZED HEALTH CARE EDUCATION/TRAINING PROGRAM
Payer: MEDICARE

## 2025-04-07 VITALS
WEIGHT: 189.6 LBS | RESPIRATION RATE: 18 BRPM | TEMPERATURE: 97.4 F | BODY MASS INDEX: 26.54 KG/M2 | HEIGHT: 71 IN | DIASTOLIC BLOOD PRESSURE: 79 MMHG | SYSTOLIC BLOOD PRESSURE: 127 MMHG | OXYGEN SATURATION: 98 % | HEART RATE: 97 BPM

## 2025-04-07 DIAGNOSIS — C20 RECTAL CANCER (HCC): ICD-10-CM

## 2025-04-07 LAB
ALBUMIN SERPL BCP-MCNC: 4.1 G/DL (ref 3.2–4.9)
ALBUMIN/GLOB SERPL: 1.4 G/DL
ALP SERPL-CCNC: 67 U/L (ref 30–99)
ALT SERPL-CCNC: 13 U/L (ref 2–50)
ANION GAP SERPL CALC-SCNC: 10 MMOL/L (ref 7–16)
AST SERPL-CCNC: 21 U/L (ref 12–45)
BASOPHILS # BLD AUTO: 0.6 % (ref 0–1.8)
BASOPHILS # BLD: 0.03 K/UL (ref 0–0.12)
BILIRUB SERPL-MCNC: 0.5 MG/DL (ref 0.1–1.5)
BUN SERPL-MCNC: 12 MG/DL (ref 8–22)
CALCIUM ALBUM COR SERPL-MCNC: 9.4 MG/DL (ref 8.5–10.5)
CALCIUM SERPL-MCNC: 9.5 MG/DL (ref 8.5–10.5)
CEA SERPL-MCNC: 2 NG/ML (ref 0–3)
CHLORIDE SERPL-SCNC: 106 MMOL/L (ref 96–112)
CO2 SERPL-SCNC: 26 MMOL/L (ref 20–33)
CREAT SERPL-MCNC: 0.99 MG/DL (ref 0.5–1.4)
EOSINOPHIL # BLD AUTO: 0.06 K/UL (ref 0–0.51)
EOSINOPHIL NFR BLD: 1.1 % (ref 0–6.9)
ERYTHROCYTE [DISTWIDTH] IN BLOOD BY AUTOMATED COUNT: 64.7 FL (ref 35.9–50)
GFR SERPLBLD CREATININE-BSD FMLA CKD-EPI: 87 ML/MIN/1.73 M 2
GLOBULIN SER CALC-MCNC: 3 G/DL (ref 1.9–3.5)
GLUCOSE SERPL-MCNC: 115 MG/DL (ref 65–99)
HCT VFR BLD AUTO: 34.8 % (ref 42–52)
HGB BLD-MCNC: 11.6 G/DL (ref 14–18)
IMM GRANULOCYTES # BLD AUTO: 0.03 K/UL (ref 0–0.11)
IMM GRANULOCYTES NFR BLD AUTO: 0.6 % (ref 0–0.9)
LYMPHOCYTES # BLD AUTO: 0.76 K/UL (ref 1–4.8)
LYMPHOCYTES NFR BLD: 14.2 % (ref 22–41)
MCH RBC QN AUTO: 30.3 PG (ref 27–33)
MCHC RBC AUTO-ENTMCNC: 33.3 G/DL (ref 32.3–36.5)
MCV RBC AUTO: 90.9 FL (ref 81.4–97.8)
MONOCYTES # BLD AUTO: 0.59 K/UL (ref 0–0.85)
MONOCYTES NFR BLD AUTO: 11 % (ref 0–13.4)
NEUTROPHILS # BLD AUTO: 3.89 K/UL (ref 1.82–7.42)
NEUTROPHILS NFR BLD: 72.5 % (ref 44–72)
NRBC # BLD AUTO: 0 K/UL
NRBC BLD-RTO: 0 /100 WBC (ref 0–0.2)
OUTPT INFUS CBC COMMENT OICOM: ABNORMAL
PLATELET # BLD AUTO: 208 K/UL (ref 164–446)
PMV BLD AUTO: 8.4 FL (ref 9–12.9)
POTASSIUM SERPL-SCNC: 3.7 MMOL/L (ref 3.6–5.5)
PROT SERPL-MCNC: 7.1 G/DL (ref 6–8.2)
RBC # BLD AUTO: 3.83 M/UL (ref 4.7–6.1)
SODIUM SERPL-SCNC: 142 MMOL/L (ref 135–145)
WBC # BLD AUTO: 5.4 K/UL (ref 4.8–10.8)

## 2025-04-07 PROCEDURE — 96375 TX/PRO/DX INJ NEW DRUG ADDON: CPT

## 2025-04-07 PROCEDURE — 80053 COMPREHEN METABOLIC PANEL: CPT

## 2025-04-07 PROCEDURE — 700101 HCHG RX REV CODE 250: Performed by: STUDENT IN AN ORGANIZED HEALTH CARE EDUCATION/TRAINING PROGRAM

## 2025-04-07 PROCEDURE — 96411 CHEMO IV PUSH ADDL DRUG: CPT

## 2025-04-07 PROCEDURE — 700105 HCHG RX REV CODE 258: Performed by: STUDENT IN AN ORGANIZED HEALTH CARE EDUCATION/TRAINING PROGRAM

## 2025-04-07 PROCEDURE — 96413 CHEMO IV INFUSION 1 HR: CPT

## 2025-04-07 PROCEDURE — G0498 CHEMO EXTEND IV INFUS W/PUMP: HCPCS

## 2025-04-07 PROCEDURE — 85025 COMPLETE CBC W/AUTO DIFF WBC: CPT

## 2025-04-07 PROCEDURE — 82378 CARCINOEMBRYONIC ANTIGEN: CPT

## 2025-04-07 PROCEDURE — 96415 CHEMO IV INFUSION ADDL HR: CPT

## 2025-04-07 PROCEDURE — 96367 TX/PROPH/DG ADDL SEQ IV INF: CPT

## 2025-04-07 PROCEDURE — A4212 NON CORING NEEDLE OR STYLET: HCPCS

## 2025-04-07 PROCEDURE — 700111 HCHG RX REV CODE 636 W/ 250 OVERRIDE (IP): Performed by: STUDENT IN AN ORGANIZED HEALTH CARE EDUCATION/TRAINING PROGRAM

## 2025-04-07 RX ORDER — EPINEPHRINE 1 MG/ML(1)
0.5 AMPUL (ML) INJECTION PRN
Status: DISCONTINUED | OUTPATIENT
Start: 2025-04-07 | End: 2025-04-07 | Stop reason: HOSPADM

## 2025-04-07 RX ORDER — DIPHENHYDRAMINE HYDROCHLORIDE 50 MG/ML
50 INJECTION, SOLUTION INTRAMUSCULAR; INTRAVENOUS PRN
Status: DISCONTINUED | OUTPATIENT
Start: 2025-04-07 | End: 2025-04-07 | Stop reason: HOSPADM

## 2025-04-07 RX ORDER — PALONOSETRON 0.05 MG/ML
0.25 INJECTION, SOLUTION INTRAVENOUS ONCE
Status: COMPLETED | OUTPATIENT
Start: 2025-04-07 | End: 2025-04-07

## 2025-04-07 RX ORDER — DEXTROSE MONOHYDRATE 50 MG/ML
INJECTION, SOLUTION INTRAVENOUS CONTINUOUS
Status: DISCONTINUED | OUTPATIENT
Start: 2025-04-07 | End: 2025-04-07 | Stop reason: HOSPADM

## 2025-04-07 RX ORDER — METHYLPREDNISOLONE SODIUM SUCCINATE 125 MG/2ML
125 INJECTION, POWDER, LYOPHILIZED, FOR SOLUTION INTRAMUSCULAR; INTRAVENOUS PRN
Status: DISCONTINUED | OUTPATIENT
Start: 2025-04-07 | End: 2025-04-07 | Stop reason: HOSPADM

## 2025-04-07 RX ORDER — DEXAMETHASONE SODIUM PHOSPHATE 4 MG/ML
12 INJECTION, SOLUTION INTRA-ARTICULAR; INTRALESIONAL; INTRAMUSCULAR; INTRAVENOUS; SOFT TISSUE ONCE
Status: COMPLETED | OUTPATIENT
Start: 2025-04-07 | End: 2025-04-07

## 2025-04-07 RX ADMIN — LEUCOVORIN CALCIUM 850 MG: 500 INJECTION, POWDER, LYOPHILIZED, FOR SOLUTION INTRAMUSCULAR; INTRAVENOUS at 16:29

## 2025-04-07 RX ADMIN — PALONOSETRON HYDROCHLORIDE 0.25 MG: 0.25 INJECTION INTRAVENOUS at 15:14

## 2025-04-07 RX ADMIN — FOSAPREPITANT 150 MG: 150 INJECTION, POWDER, LYOPHILIZED, FOR SOLUTION INTRAVENOUS at 15:32

## 2025-04-07 RX ADMIN — OXALIPLATIN 180 MG: 5 INJECTION, SOLUTION INTRAVENOUS at 16:27

## 2025-04-07 RX ADMIN — LIDOCAINE HYDROCHLORIDE 0.5 ML: 10 SOLUTION INTRAVENOUS at 13:51

## 2025-04-07 RX ADMIN — FLUOROURACIL 850 MG: 50 INJECTION, SOLUTION INTRAVENOUS at 18:51

## 2025-04-07 RX ADMIN — DEXAMETHASONE SODIUM PHOSPHATE 12 MG: 4 INJECTION INTRA-ARTICULAR; INTRALESIONAL; INTRAMUSCULAR; INTRAVENOUS; SOFT TISSUE at 15:21

## 2025-04-07 RX ADMIN — DEXTROSE MONOHYDRATE: 50 INJECTION, SOLUTION INTRAVENOUS at 15:21

## 2025-04-07 RX ADMIN — FLUOROURACIL 5350 MG: 50 INJECTION, SOLUTION INTRAVENOUS at 18:57

## 2025-04-07 ASSESSMENT — FIBROSIS 4 INDEX: FIB4 SCORE: 1.75

## 2025-04-07 NOTE — PROGRESS NOTES
"Pharmacy Chemotherapy Calculation:    Dx: Rectal adenocarcinoma, stage IIA, pMMR          Protocol: [id number]     *Dosing Reference*  OXALIplatin 85 mg/m2 IV over 2 hours on Day 1 infused concurrently with   Leucovorin 400 mg/m2 IV over 2 hours on Day 1   Fluorouracil 400 mg/m2 IV push followed by  Fluorouracil 2400 mg/m2 CIVI over 46-48 hours   Repeat every 14 days x 4-12 cycles or perioperative therapy (neoadjuvant or adjuvant) or until disease progression or unacceptable toxicity (advanced or metastatic)    NCCN Guidelines for Rectal Cancer V.1.2024.  De Yenifer SYLVESTER et al.J Clin Oncol.2000;18(16):2938-47.  Alan SL, et al.Br J Cancer.2002;87(4):393-9.  Licha-Prashanth F, et al. Tiana Oncol.2000;11(11);1477-83.  Abdi Tlauren al. N Engl J Med.2004;350(23):2343-51.    Allergies:  Patient has no known allergies.     /79   Pulse 97   Temp 36.3 °C (97.4 °F) (Temporal)   Resp 18   Ht 1.803 m (5' 11\")   Wt 86 kg (189 lb 9.5 oz)   SpO2 98%   BMI 26.44 kg/m²  Body surface area is 2.08 meters squared.    Labs 4/7/25:  ANC~ 3890 Plt = 208 k   Hgb = 11.6     SCr = 0.99 mg/dL CrCl ~ 96 mL/min   AST/ALT/AP = WNL TBili = 0.5  K+ = 3.7     Drug Order   (Drug name, dose, route, IV Fluid & volume, frequency, number of doses) Cycle 1, Day 1      Previous treatment: capecitabine + XRT     Medication = OXALIplatin  Base Dose = 85 mg/m2  Calc Dose: Base Dose x 2.08 m² = 176.4 mg  Final Dose = 180 mg  Route = IV  Fluid & Volume = D5W 250 mL  Admin Duration = Over 2 hours     Infuse concurrently with LCV      <10% difference, okay to treat with final dose     Medication = Leucovorin  Base Dose = 400 mg/m2   Calc Dose: Base Dose x 2.08 m² = 832 mg  Final Dose = 850 mg  Route = IV  Fluid & Volume = D5W 250 mL  Admin Duration = Over 2 hours      Infuse concurrently with OXALIplatin      <10% difference, okay to treat with final dose     Medication = Fluorouracil  Base Dose = 400 mg/m2  Calc Dose:Base Dose x 2.08 m² = 832 " mg  Final Dose = 850 mg  Route = IV  Fluid & Volume = 50 mg/mL; 17 mL  Admin Duration = Over 5 min           <10% difference, okay to treat with final dose     Medication = Fluorouracil  Base Dose = 2400 mg/m2  Calc Dose: Base Dose x 2.08 m² = 4992 mg  Final Dose = 5350 mg  Route = CIVI via CADD pump  Fluid & Volume = 50mg/mL; 107 mL +3 mL overfill   Admin Duration = Over 46 hrs @ 2.3  ml/min          <10% difference, okay to treat with final dose       By my signature below, I confirm this process was performed independently with the BSA and all final chemotherapy dosing calculations congruent. I have reviewed the above chemotherapy order and that my calculation of the final dose and BSA (when applicable) corroborate those calculations of the  pharmacist. Discrepancies of 10% or greater in the written dose have been addressed and documented within the EPIC Progress notes.    Sabra Puentes, PharmD, BCOP

## 2025-04-07 NOTE — PROGRESS NOTES
Chemotherapy Verification - SECONDARY RN       Height = 180.3 cm  Weight = 86 kg  BSA = 2.08 m2       Medication: oxaliplatin  Dose: 85 mg/m2  Calculated Dose: 176.8 mg                             (In mg/m2, AUC, mg/kg)     Medication: 5FU bolus inj  Dose: 400 mg/m2  Calculated Dose: 832 mg                             (In mg/m2, AUC, mg/kg)    Medication: 5FU CADD pump  Dose: 2400 mg/m2  Calculated Dose: 4992 mg                             (In mg/m2, AUC, mg/kg)      I confirm that this process was performed independently.

## 2025-04-07 NOTE — PROGRESS NOTES
Chemotherapy Verification - PRIMARY RN      Height = 180.3 cm  Weight = 86 kg  BSA = 2.08 m^2       Medication: oxaliplatin  Dose: 85 mg/m^2  Calculated Dose: 176.8 mg                             (In mg/m2, AUC, mg/kg)     Medication: fluroruacil  Dose: 400 mg/m^2  Calculated Dose: 832 mg                             (In mg/m2, AUC, mg/kg)    Medication: fluorouracil  Dose: 2400 mg/m^2  Calculated Dose: 4992 mg over 46 hours via CADD pump                             (In mg/m2, AUC, mg/kg)    I confirm this process was performed independently with the BSA and all final chemotherapy dosing calculations congruent.  Any discrepancies of 10% or greater have been addressed with the chemotherapy pharmacist. The resolution of the discrepancy has been documented in the EPIC progress notes.

## 2025-04-08 ENCOUNTER — PATIENT OUTREACH (OUTPATIENT)
Dept: ONCOLOGY | Facility: MEDICAL CENTER | Age: 60
End: 2025-04-08
Payer: MEDICARE

## 2025-04-08 NOTE — PROGRESS NOTES
On April 8, 2025, , Mary Ball, called pt. to check in on his well-being. AMY Ball reached pt.s'  partner Tabatha, who reported that pt. was out on a walk. AMY Ball informed Tabatha that AMY Ball saw that pt.'s father had passed away and AMY Ball wanted to offer condolences. Tabatha was thankful for the call and stated that they are “hanging in there”. AMY Ball provided active listening and support as Tabatha discussed their family situation.     AMY Ball inquired how pt.'s chemo appointment went yesterday. Tabatha stated that pt. is doing well, but it was a long day for them. Tabatha stated that they brought pt.'s service dog for the day as well. Pt. is unable to stay in lodging at the [facility] due to pt.'s service dog and pt. drives in for the day for any appointment. Tabatha reported that pt. has 7 cycles of chemo left.     AMY Ball informed Tabatha that the gas assistance applications are now available. AMY Ball informed Tabatha that she can leave the application for pt. to fill out when pt.'s comes for his appointment on 04/09/2025 at 5:00PM at Infusion. AMY Ball instructed Tabatha to leave the filled out form at the Infusion desk and AMY Ball will  the following day and submit it.     AMY Ball provided contact information again to Tabatha and she stated that she would inform pt. of the call. AMY Ball stated she would be back in touch after submitting the gas application.

## 2025-04-08 NOTE — PROGRESS NOTES
Martínez is here for Day 1, Cycle 1 mFOLFOX. Lidocaine used prior to accessing port. Port accessed using sterile technique; brisk blood return noted. Labs drawn as ordered. Labs reviewed and within parameters to proceed with treatment. Pre-medications given per MAR. Oxaliplatin and Luecovorin given per MAR concurrently. 5FU bolus given per MAR. Martínez then connected to 5FU CADD pump. Pump set to RUN mode; all connections open. Next appointment scheduled. Discharged to self care; no apparent distress noted.

## 2025-04-09 ENCOUNTER — OUTPATIENT INFUSION SERVICES (OUTPATIENT)
Dept: ONCOLOGY | Facility: MEDICAL CENTER | Age: 60
End: 2025-04-09
Attending: STUDENT IN AN ORGANIZED HEALTH CARE EDUCATION/TRAINING PROGRAM
Payer: MEDICARE

## 2025-04-09 VITALS
OXYGEN SATURATION: 96 % | HEART RATE: 69 BPM | SYSTOLIC BLOOD PRESSURE: 153 MMHG | DIASTOLIC BLOOD PRESSURE: 74 MMHG | TEMPERATURE: 97.4 F | RESPIRATION RATE: 16 BRPM

## 2025-04-09 DIAGNOSIS — C20 RECTAL CANCER (HCC): ICD-10-CM

## 2025-04-09 PROCEDURE — 96523 IRRIG DRUG DELIVERY DEVICE: CPT

## 2025-04-10 NOTE — PROGRESS NOTES
Pt presented to [facility] for D3 C1 5FU pump disconnect. POC discussed and pt verbalized understanding, reports constipation after his first treatment. Pt educated on following chemo education class instructions for constipation symptoms, when to contact provider if ineffective and/or if he has any other concerns. Pt acknowledged understanding.     Pump in STOP mode and pump reads 106.8 ml given. Pump disconnected and port flushed per policy. Brisk blood return noted, line flushed per policy.  Port de-accessed with needle intact. Site covered with sterile gauze/paper tape. Future chemo appts printed for pt and pt discharged ambulatory in NAD..

## 2025-04-11 ENCOUNTER — PATIENT OUTREACH (OUTPATIENT)
Dept: ONCOLOGY | Facility: MEDICAL CENTER | Age: 60
End: 2025-04-11
Payer: MEDICARE

## 2025-04-11 NOTE — PROGRESS NOTES
On April 11, 2025, , Mary Ball, submitted [facility] gas card application to [employer].

## 2025-04-13 RX ORDER — DEXTROSE MONOHYDRATE 50 MG/ML
INJECTION, SOLUTION INTRAVENOUS CONTINUOUS
Status: CANCELLED | OUTPATIENT
Start: 2025-04-21

## 2025-04-13 RX ORDER — PALONOSETRON 0.05 MG/ML
0.25 INJECTION, SOLUTION INTRAVENOUS ONCE
Status: CANCELLED | OUTPATIENT
Start: 2025-04-21 | End: 2025-04-21

## 2025-04-13 RX ORDER — 0.9 % SODIUM CHLORIDE 0.9 %
10 VIAL (ML) INJECTION PRN
Status: CANCELLED | OUTPATIENT
Start: 2025-04-21

## 2025-04-13 RX ORDER — 0.9 % SODIUM CHLORIDE 0.9 %
10 VIAL (ML) INJECTION PRN
Status: CANCELLED | OUTPATIENT
Start: 2025-04-20

## 2025-04-13 RX ORDER — DEXAMETHASONE SODIUM PHOSPHATE 4 MG/ML
12 INJECTION, SOLUTION INTRA-ARTICULAR; INTRALESIONAL; INTRAMUSCULAR; INTRAVENOUS; SOFT TISSUE ONCE
Status: CANCELLED | OUTPATIENT
Start: 2025-04-21 | End: 2025-04-21

## 2025-04-13 RX ORDER — METHYLPREDNISOLONE SODIUM SUCCINATE 125 MG/2ML
125 INJECTION, POWDER, LYOPHILIZED, FOR SOLUTION INTRAMUSCULAR; INTRAVENOUS PRN
Status: CANCELLED | OUTPATIENT
Start: 2025-04-21

## 2025-04-13 RX ORDER — PROCHLORPERAZINE MALEATE 10 MG
10 TABLET ORAL EVERY 6 HOURS PRN
Status: CANCELLED | OUTPATIENT
Start: 2025-04-21

## 2025-04-13 RX ORDER — 0.9 % SODIUM CHLORIDE 0.9 %
VIAL (ML) INJECTION PRN
Status: CANCELLED | OUTPATIENT
Start: 2025-04-21

## 2025-04-13 RX ORDER — DIPHENHYDRAMINE HYDROCHLORIDE 50 MG/ML
50 INJECTION, SOLUTION INTRAMUSCULAR; INTRAVENOUS PRN
Status: CANCELLED | OUTPATIENT
Start: 2025-04-21

## 2025-04-13 RX ORDER — ONDANSETRON 8 MG/1
8 TABLET, ORALLY DISINTEGRATING ORAL PRN
Status: CANCELLED | OUTPATIENT
Start: 2025-04-21

## 2025-04-13 RX ORDER — ONDANSETRON 2 MG/ML
4 INJECTION INTRAMUSCULAR; INTRAVENOUS PRN
Status: CANCELLED | OUTPATIENT
Start: 2025-04-21

## 2025-04-13 RX ORDER — 0.9 % SODIUM CHLORIDE 0.9 %
20 VIAL (ML) INJECTION PRN
Status: CANCELLED | OUTPATIENT
Start: 2025-04-23

## 2025-04-13 RX ORDER — 0.9 % SODIUM CHLORIDE 0.9 %
3 VIAL (ML) INJECTION PRN
Status: CANCELLED | OUTPATIENT
Start: 2025-04-20

## 2025-04-13 RX ORDER — 0.9 % SODIUM CHLORIDE 0.9 %
3 VIAL (ML) INJECTION PRN
Status: CANCELLED | OUTPATIENT
Start: 2025-04-21

## 2025-04-13 RX ORDER — 0.9 % SODIUM CHLORIDE 0.9 %
VIAL (ML) INJECTION PRN
Status: CANCELLED | OUTPATIENT
Start: 2025-04-20

## 2025-04-13 RX ORDER — EPINEPHRINE 1 MG/ML(1)
0.5 AMPUL (ML) INJECTION PRN
Status: CANCELLED | OUTPATIENT
Start: 2025-04-21

## 2025-04-14 ENCOUNTER — PATIENT OUTREACH (OUTPATIENT)
Dept: ONCOLOGY | Facility: MEDICAL CENTER | Age: 60
End: 2025-04-14
Payer: MEDICARE

## 2025-04-14 ENCOUNTER — TELEPHONE (OUTPATIENT)
Dept: ONCOLOGY | Facility: MEDICAL CENTER | Age: 60
End: 2025-04-14
Payer: MEDICARE

## 2025-04-14 NOTE — TELEPHONE ENCOUNTER
Nutrition Services: Brief Update / Telephone Encounter     Martínez Lyons Jr. is a 60 y.o. male with diagnosis of Rectal cancer     Wt Readings from Last 7 Encounters:   04/07/25 86 kg (189 lb 9.5 oz)   03/19/25 88.5 kg (195 lb 1.7 oz)   03/12/25 88.5 kg (195 lb 1.7 oz)   03/05/25 88.7 kg (195 lb 8.8 oz)   03/25/25 87 kg (191 lb 12.8 oz)   03/24/25 86.7 kg (191 lb 3.2 oz)   03/10/25 89.4 kg (197 lb)     Weight Change: potential 2.5 kg loss within past ~ 3 weeks. This is a 2.9% loss. This holds potential for significance.     Pertinent Recent Lab work (4/7/25): reviewed    RD able to call pt for check-in as started chemotherapy of FOLFOX last week. Pt answers, states is managing fairly overall. Mentions has a decline in energy and appetite, though is still eating and moving around the house. Mentions his dog helps keep him active for their walk. Asks what can help with constipation outside of miralax. Mentions had rectal pain after radiation, though this has improved. Denies any additional concerns or questions at this time.     Plan/Recommend:  RD discussed including high fiber foods, such as fruits, vegetables, beans, and whole grains may help with more regular bowel regimen. Discussed maintaining adequate hydration and including prunes or prune juice to ensure regular transit. Discussed sometimes increasing fiber, particularly insoluble fiber, does not always result in a laxative effect, whereas the addition of prunes may assist with this.   Encouraged continuation of physical activity as tolerated to delay onset of fatigue and maintain LBM.   RD sent [employer] message to ensure pt still has contact information for any follow-up questions or concerns.     Pt verbalizes understanding and is receptive to information provided.   RD available PRN and will make further recommendations as indicated within policy.    [phone]

## 2025-04-14 NOTE — PROGRESS NOTES
On April 14, 2025, Oncology Social Worker Sylvie Dubois processed pt.'s [facility] Patient Transportation [employer] application.  Pt. will receive $200 in gas card to assist with transportation.  OSW Silvino left gas card at [facility] for pt. to .

## 2025-04-17 NOTE — PROGRESS NOTES
"Follow Up Note:  Hematology/Oncology    Current Diagnosis and Staging: rectal adenocarcinoma, [id number] stage IIA disease, pMMR.   Date of Diagnosis: dec 2024    Chief Complaint: Patient seen today for evaluation prior to cycle 2 for continued monitoring of symptoms and side effects of cancer treatments.     Care Team:   Pcp Not In Computer  Dr Nesbitt    Oncology History of Presenting Illness:   Per Dr Nesbitt \"Martínez Lyons Jr. is a 59 y.o.  man who presents to the clinic for evaluation for systemic therapy for a new diagnosis of rectal adenocarcinoma. He had been having blood in his stool with irregular bowel habits for 1 year, and finally went to be evaluated. He had never had a colonoscopy before now. His scope revealed a mass in the rectum, and subsequent MRI rectal protocol revealed a cT3N0 disease. Flexible sigmoidoscopy with repeat biopsy revealed invasive moderately differentiated adenocarcinoma, pMMR. He was referred for evaluation accordingly.\"  He initiated Capecitabine with concurrent XR on 2/10/25 , followed by FOLFOX and then surgical evaluation     Current Treatment: FOLFOX6  Treatment History:   02/10/25-03/24/25: IMRT + xeloda  04/07/25: C1 FOLFOX6  04/21/25: C2 FOLFOX6 (scheduled)    Interval History Mamadou MONROE:  Martínez returns to clinic today for a pretreatment appointment, accompanied by his wife.  He tolerated cycle 1 fairly well.  He does report cold sensitivity x4-6d post Tx (tingling in fingertips bilaterally, cold sensation with fluids) which is mild (occasionally moderate). He has baseline neuropathy in both hands which he describes as \"tough skin and poor sensation in general\" but no impairment in dexterity or strength.  He also notes some constipation for a day or 2 (improved with MiraLAX and dietary modification), peeling/dry skin on palms of his hands mild (using Aquaphor).  He travels from [facility], near [place] and is wondering if he can disconnect his CADD pump at home. " He considered staying at the [facility] but would not be able to bring his dog which is a barrier for him.     Allergies as of 04/21/2025    (No Known Allergies)       Current Outpatient Medications:     losartan-hydrochlorothiazide (HYZAAR) 100-12.5 MG per tablet, Take 1 Tablet by mouth every day., Disp: , Rfl:     Capecitabine (XELODA PO), Take 1,600 mg by mouth. Takes on RT days (Patient not taking: Reported on 4/7/2025), Disp: , Rfl:     silver sulfADIAZINE (SILVADENE) 1 % Cream, Apply 1/8 inch layer to affected area twice a day, Disp: 400 g, Rfl: 2    ondansetron (ZOFRAN) 4 MG Tab tablet, Take 1 Tablet by mouth every four hours as needed for Nausea/Vomiting., Disp: 20 Tablet, Rfl: 3    prochlorperazine (COMPAZINE) 10 MG Tab, Take 1 Tablet by mouth every 6 hours as needed for Nausea/Vomiting., Disp: 30 Tablet, Rfl: 3    acetaminophen (TYLENOL) 325 MG Tab, Take 650 mg by mouth every four hours as needed for Mild Pain., Disp: , Rfl:     losartan (COZAAR) 50 MG Tab, Take 1 Tablet by mouth every day. (Patient not taking: Reported on 3/24/2025), Disp: 60 Tablet, Rfl: 1  Review of Systems:  Review of Systems   Constitutional:  Positive for malaise/fatigue (mild, able to walk his dog few times per day 30 min instead of 90). Negative for chills, diaphoresis, fever and weight loss.   HENT:  Negative for tinnitus.    Eyes:  Negative for blurred vision and double vision.   Respiratory:  Negative for cough and shortness of breath.    Cardiovascular:  Negative for chest pain.   Gastrointestinal:  Positive for constipation (mild). Negative for abdominal pain, blood in stool, diarrhea, heartburn, melena, nausea and vomiting.   Genitourinary:  Negative for dysuria and hematuria.   Musculoskeletal:  Negative for myalgias.   Skin:  Negative for rash.        Dry, peeling skin on hands     Neurological:  Positive for tingling. Negative for dizziness, tremors, sensory change, focal weakness and headaches.   Endo/Heme/Allergies:  Does not  "bruise/bleed easily.     Physical Exam:  Vitals:    04/21/25 1127   BP: (!) 152/83   Pulse: 62   Resp: 18   Temp: 36.8 °C (98.3 °F)   SpO2: 97%   Weight: 86.2 kg (190 lb)   Height: 1.803 m (5' 10.98\")     Karnofsky Performance Status: 100  Physical Exam  Constitutional:       Appearance: Normal appearance.   Cardiovascular:      Rate and Rhythm: Normal rate and regular rhythm.      Heart sounds: Normal heart sounds.   Pulmonary:      Effort: Pulmonary effort is normal.      Breath sounds: Normal breath sounds.   Abdominal:      General: Abdomen is flat. Bowel sounds are normal.      Palpations: Abdomen is soft.   Skin:     Comments: Mild dry skin/peeling on palms of hands bilaterally, no signs of infx, erythema or break in skin     Neurological:      Mental Status: He is alert and oriented to person, place, and time.      Sensory: Sensory deficit (sensation diminished throughout palms of both hands) present.      Motor: No weakness, tremor or atrophy.      Coordination: Coordination normal. Rapid alternating movements normal.   Psychiatric:         Behavior: Behavior normal.       Labs:   Latest Reference Range & Units 04/07/25 14:00   WBC 4.8 - 10.8 K/uL 5.4   RBC 4.70 - 6.10 M/uL 3.83 (L)   Hemoglobin 14.0 - 18.0 g/dL 11.6 (L)   Hematocrit 42.0 - 52.0 % 34.8 (L)   MCV 81.4 - 97.8 fL 90.9   MCH 27.0 - 33.0 pg 30.3   MCHC 32.3 - 36.5 g/dL 33.3   RDW 35.9 - 50.0 fL 64.7 (H)   Platelet Count 164 - 446 K/uL 208   MPV 9.0 - 12.9 fL 8.4 (L)   Neutrophils-Polys 44.00 - 72.00 % 72.50 (H)   Neutrophils (Absolute) 1.82 - 7.42 K/uL 3.89   Lymphocytes 22.00 - 41.00 % 14.20 (L)   Lymphs (Absolute) 1.00 - 4.80 K/uL 0.76 (L)   Monocytes 0.00 - 13.40 % 11.00   Monos (Absolute) 0.00 - 0.85 K/uL 0.59   Eosinophils 0.00 - 6.90 % 1.10   Eos (Absolute) 0.00 - 0.51 K/uL 0.06   Basophils 0.00 - 1.80 % 0.60   Baso (Absolute) 0.00 - 0.12 K/uL 0.03   Immature Granulocytes 0.00 - 0.90 % 0.60   Immature Granulocytes (abs) 0.00 - 0.11 K/uL " 0.03   Nucleated RBC 0.00 - 0.20 /100 WBC 0.00   NRBC (Absolute) K/uL 0.00   Outpt Infus CBC Comment  see below   Sodium 135 - 145 mmol/L 142   Potassium 3.6 - 5.5 mmol/L 3.7   Chloride 96 - 112 mmol/L 106   Co2 20 - 33 mmol/L 26   Anion Gap 7.0 - 16.0  10.0   Glucose 65 - 99 mg/dL 115 (H)   Bun 8 - 22 mg/dL 12   Creatinine 0.50 - 1.40 mg/dL 0.99   GFR (CKD-EPI) >60 mL/min/1.73 m 2 87   Calcium 8.5 - 10.5 mg/dL 9.5   Correct Calcium 8.5 - 10.5 mg/dL 9.4   AST(SGOT) 12 - 45 U/L 21   ALT(SGPT) 2 - 50 U/L 13   Alkaline Phosphatase 30 - 99 U/L 67   Total Bilirubin 0.1 - 1.5 mg/dL 0.5   Albumin 3.2 - 4.9 g/dL 4.1   Total Protein 6.0 - 8.2 g/dL 7.1   Globulin 1.9 - 3.5 g/dL 3.0   A-G Ratio g/dL 1.4   Carcinoembryonic Antigen 0.0 - 3.0 ng/mL 2.0     Imaging: no recent      Assessment & Plan:  1. Rectal cancer (HCC)        2. Encounter for long-term current use of high risk medication        3. Encounter for chemotherapy management            Rectal adenocarcinoma, [id number] stage IIA disease, pMMR  .Dx Dec 2024, s/p chemoRT, currently on FOLFOX  Stability of condition: stable    Treatment Plan: FOLFOX6  Patient is meeting criteria to proceed with Cycle 2 of 8, based on the clinical and laboratory information available to me at this time.   Labs drawn in clinic today    Encouraged hydration, senna, miralax prn constipation  Encouraged unscented hand lotion for peeling  Pt will monitor his cold sensitivity closely & notify us if it progresses, pt has baseline neuropathy/diminished sensation in hands    2. Encounter for High Risk Medication Use  Toxicity: Patient is getting antineoplastic therapy and needs monitoring of blood counts, hepatic function, and renal function due to potential for organ dysfunction. Appropriate lab work has been ordered per treatment plan.   - CBC w/ diff, CMP, CEA  ctDNA      Future Imaging: Repeat MRI rectal protocol and CT c/a/p after completion of therapy   Return for Follow Up: 2 weeks for  prechemo/RN port lab draw        Any questions and concerns raised by the patient were answered to the best of my ability. Thank you for allowing me to participate in the care for this patient. Please feel free to contact me for any questions or concerns.   Total time spent on chart review, clinic encounter, documentation, coordination of care: 30 minutes.   Please note that this dictation was created using voice recognition software. I have made every reasonable attempt to correct obvious errors, but I expect that there are errors of grammar and possibly content that I did not discover before finalizing the note.

## 2025-04-21 ENCOUNTER — HOSPITAL ENCOUNTER (OUTPATIENT)
Facility: MEDICAL CENTER | Age: 60
End: 2025-04-21
Attending: STUDENT IN AN ORGANIZED HEALTH CARE EDUCATION/TRAINING PROGRAM
Payer: MEDICARE

## 2025-04-21 ENCOUNTER — HOSPITAL ENCOUNTER (OUTPATIENT)
Dept: HEMATOLOGY ONCOLOGY | Facility: MEDICAL CENTER | Age: 60
End: 2025-04-21
Attending: PHYSICIAN ASSISTANT
Payer: MEDICARE

## 2025-04-21 ENCOUNTER — OUTPATIENT INFUSION SERVICES (OUTPATIENT)
Dept: ONCOLOGY | Facility: MEDICAL CENTER | Age: 60
End: 2025-04-21
Attending: STUDENT IN AN ORGANIZED HEALTH CARE EDUCATION/TRAINING PROGRAM
Payer: MEDICARE

## 2025-04-21 ENCOUNTER — HOSPITAL ENCOUNTER (OUTPATIENT)
Dept: HEMATOLOGY ONCOLOGY | Facility: MEDICAL CENTER | Age: 60
End: 2025-04-21
Attending: STUDENT IN AN ORGANIZED HEALTH CARE EDUCATION/TRAINING PROGRAM
Payer: MEDICARE

## 2025-04-21 VITALS
SYSTOLIC BLOOD PRESSURE: 137 MMHG | TEMPERATURE: 97.3 F | RESPIRATION RATE: 18 BRPM | HEIGHT: 71 IN | OXYGEN SATURATION: 96 % | HEART RATE: 85 BPM | BODY MASS INDEX: 26.67 KG/M2 | DIASTOLIC BLOOD PRESSURE: 77 MMHG | WEIGHT: 190.48 LBS

## 2025-04-21 VITALS
TEMPERATURE: 98.3 F | DIASTOLIC BLOOD PRESSURE: 83 MMHG | HEIGHT: 71 IN | WEIGHT: 190 LBS | BODY MASS INDEX: 26.6 KG/M2 | SYSTOLIC BLOOD PRESSURE: 152 MMHG | RESPIRATION RATE: 18 BRPM | HEART RATE: 62 BPM | OXYGEN SATURATION: 97 %

## 2025-04-21 VITALS
WEIGHT: 190 LBS | RESPIRATION RATE: 18 BRPM | BODY MASS INDEX: 26.5 KG/M2 | DIASTOLIC BLOOD PRESSURE: 83 MMHG | HEART RATE: 62 BPM | SYSTOLIC BLOOD PRESSURE: 152 MMHG | TEMPERATURE: 98.3 F

## 2025-04-21 DIAGNOSIS — C20 RECTAL CANCER (HCC): ICD-10-CM

## 2025-04-21 DIAGNOSIS — Z79.899 ENCOUNTER FOR LONG-TERM CURRENT USE OF HIGH RISK MEDICATION: ICD-10-CM

## 2025-04-21 DIAGNOSIS — K62.89 RECTAL MASS: ICD-10-CM

## 2025-04-21 DIAGNOSIS — Z51.11 ENCOUNTER FOR CHEMOTHERAPY MANAGEMENT: ICD-10-CM

## 2025-04-21 LAB
ALBUMIN SERPL BCP-MCNC: 3.9 G/DL (ref 3.2–4.9)
ALBUMIN/GLOB SERPL: 1.3 G/DL
ALP SERPL-CCNC: 78 U/L (ref 30–99)
ALT SERPL-CCNC: 15 U/L (ref 2–50)
ANION GAP SERPL CALC-SCNC: 9 MMOL/L (ref 7–16)
AST SERPL-CCNC: 22 U/L (ref 12–45)
BASOPHILS # BLD AUTO: 0.9 % (ref 0–1.8)
BASOPHILS # BLD: 0.04 K/UL (ref 0–0.12)
BILIRUB SERPL-MCNC: 0.4 MG/DL (ref 0.1–1.5)
BUN SERPL-MCNC: 13 MG/DL (ref 8–22)
CALCIUM ALBUM COR SERPL-MCNC: 9.3 MG/DL (ref 8.5–10.5)
CALCIUM SERPL-MCNC: 9.2 MG/DL (ref 8.5–10.5)
CEA SERPL-MCNC: 2 NG/ML (ref 0–3)
CHLORIDE SERPL-SCNC: 105 MMOL/L (ref 96–112)
CO2 SERPL-SCNC: 24 MMOL/L (ref 20–33)
CREAT SERPL-MCNC: 1 MG/DL (ref 0.5–1.4)
EOSINOPHIL # BLD AUTO: 0.07 K/UL (ref 0–0.51)
EOSINOPHIL NFR BLD: 1.6 % (ref 0–6.9)
ERYTHROCYTE [DISTWIDTH] IN BLOOD BY AUTOMATED COUNT: 57.8 FL (ref 35.9–50)
GFR SERPLBLD CREATININE-BSD FMLA CKD-EPI: 86 ML/MIN/1.73 M 2
GLOBULIN SER CALC-MCNC: 3.1 G/DL (ref 1.9–3.5)
GLUCOSE SERPL-MCNC: 113 MG/DL (ref 65–99)
HCT VFR BLD AUTO: 33.2 % (ref 42–52)
HGB BLD-MCNC: 11.3 G/DL (ref 14–18)
IMM GRANULOCYTES # BLD AUTO: 0.02 K/UL (ref 0–0.11)
IMM GRANULOCYTES NFR BLD AUTO: 0.4 % (ref 0–0.9)
LYMPHOCYTES # BLD AUTO: 0.64 K/UL (ref 1–4.8)
LYMPHOCYTES NFR BLD: 14.3 % (ref 22–41)
MCH RBC QN AUTO: 30.5 PG (ref 27–33)
MCHC RBC AUTO-ENTMCNC: 34 G/DL (ref 32.3–36.5)
MCV RBC AUTO: 89.7 FL (ref 81.4–97.8)
MONOCYTES # BLD AUTO: 0.69 K/UL (ref 0–0.85)
MONOCYTES NFR BLD AUTO: 15.4 % (ref 0–13.4)
NEUTROPHILS # BLD AUTO: 3.03 K/UL (ref 1.82–7.42)
NEUTROPHILS NFR BLD: 67.4 % (ref 44–72)
NRBC # BLD AUTO: 0 K/UL
NRBC BLD-RTO: 0 /100 WBC (ref 0–0.2)
PLATELET # BLD AUTO: 186 K/UL (ref 164–446)
PMV BLD AUTO: 8.5 FL (ref 9–12.9)
POTASSIUM SERPL-SCNC: 3.8 MMOL/L (ref 3.6–5.5)
PROT SERPL-MCNC: 7 G/DL (ref 6–8.2)
RBC # BLD AUTO: 3.7 M/UL (ref 4.7–6.1)
SODIUM SERPL-SCNC: 138 MMOL/L (ref 135–145)
WBC # BLD AUTO: 4.5 K/UL (ref 4.8–10.8)

## 2025-04-21 PROCEDURE — 96411 CHEMO IV PUSH ADDL DRUG: CPT

## 2025-04-21 PROCEDURE — 80053 COMPREHEN METABOLIC PANEL: CPT

## 2025-04-21 PROCEDURE — A4212 NON CORING NEEDLE OR STYLET: HCPCS

## 2025-04-21 PROCEDURE — 96413 CHEMO IV INFUSION 1 HR: CPT

## 2025-04-21 PROCEDURE — G0498 CHEMO EXTEND IV INFUS W/PUMP: HCPCS

## 2025-04-21 PROCEDURE — 700111 HCHG RX REV CODE 636 W/ 250 OVERRIDE (IP): Performed by: NURSE PRACTITIONER

## 2025-04-21 PROCEDURE — 82378 CARCINOEMBRYONIC ANTIGEN: CPT

## 2025-04-21 PROCEDURE — 96375 TX/PRO/DX INJ NEW DRUG ADDON: CPT

## 2025-04-21 PROCEDURE — 99212 OFFICE O/P EST SF 10 MIN: CPT | Performed by: PHYSICIAN ASSISTANT

## 2025-04-21 PROCEDURE — 36591 DRAW BLOOD OFF VENOUS DEVICE: CPT

## 2025-04-21 PROCEDURE — 85025 COMPLETE CBC W/AUTO DIFF WBC: CPT

## 2025-04-21 PROCEDURE — 96367 TX/PROPH/DG ADDL SEQ IV INF: CPT

## 2025-04-21 PROCEDURE — 99214 OFFICE O/P EST MOD 30 MIN: CPT | Performed by: PHYSICIAN ASSISTANT

## 2025-04-21 PROCEDURE — 700105 HCHG RX REV CODE 258: Performed by: NURSE PRACTITIONER

## 2025-04-21 PROCEDURE — 96415 CHEMO IV INFUSION ADDL HR: CPT

## 2025-04-21 RX ORDER — METHYLPREDNISOLONE SODIUM SUCCINATE 125 MG/2ML
125 INJECTION, POWDER, LYOPHILIZED, FOR SOLUTION INTRAMUSCULAR; INTRAVENOUS PRN
Status: DISCONTINUED | OUTPATIENT
Start: 2025-04-21 | End: 2025-04-21 | Stop reason: HOSPADM

## 2025-04-21 RX ORDER — DIPHENHYDRAMINE HYDROCHLORIDE 50 MG/ML
50 INJECTION, SOLUTION INTRAMUSCULAR; INTRAVENOUS PRN
Status: DISCONTINUED | OUTPATIENT
Start: 2025-04-21 | End: 2025-04-21 | Stop reason: HOSPADM

## 2025-04-21 RX ORDER — DEXAMETHASONE SODIUM PHOSPHATE 4 MG/ML
12 INJECTION, SOLUTION INTRA-ARTICULAR; INTRALESIONAL; INTRAMUSCULAR; INTRAVENOUS; SOFT TISSUE ONCE
Status: COMPLETED | OUTPATIENT
Start: 2025-04-21 | End: 2025-04-21

## 2025-04-21 RX ORDER — PALONOSETRON 0.05 MG/ML
0.25 INJECTION, SOLUTION INTRAVENOUS ONCE
Status: COMPLETED | OUTPATIENT
Start: 2025-04-21 | End: 2025-04-21

## 2025-04-21 RX ORDER — EPINEPHRINE 1 MG/ML(1)
0.5 AMPUL (ML) INJECTION PRN
Status: DISCONTINUED | OUTPATIENT
Start: 2025-04-21 | End: 2025-04-21 | Stop reason: HOSPADM

## 2025-04-21 RX ADMIN — DEXAMETHASONE SODIUM PHOSPHATE 12 MG: 4 INJECTION INTRA-ARTICULAR; INTRALESIONAL; INTRAMUSCULAR; INTRAVENOUS; SOFT TISSUE at 14:03

## 2025-04-21 RX ADMIN — OXALIPLATIN 180 MG: 5 INJECTION, SOLUTION INTRAVENOUS at 14:54

## 2025-04-21 RX ADMIN — PALONOSETRON HYDROCHLORIDE 0.25 MG: 0.25 INJECTION INTRAVENOUS at 14:01

## 2025-04-21 RX ADMIN — FLUOROURACIL 850 MG: 50 INJECTION, SOLUTION INTRAVENOUS at 17:35

## 2025-04-21 RX ADMIN — FOSAPREPITANT 150 MG: 150 INJECTION, POWDER, LYOPHILIZED, FOR SOLUTION INTRAVENOUS at 14:08

## 2025-04-21 RX ADMIN — FLUOROURACIL 5350 MG: 50 INJECTION, SOLUTION INTRAVENOUS at 17:36

## 2025-04-21 RX ADMIN — LEUCOVORIN CALCIUM 850 MG: 500 INJECTION, POWDER, LYOPHILIZED, FOR SOLUTION INTRAMUSCULAR; INTRAVENOUS at 14:54

## 2025-04-21 ASSESSMENT — ENCOUNTER SYMPTOMS
TREMORS: 0
TINGLING: 1
WEIGHT LOSS: 0
DIARRHEA: 0
SHORTNESS OF BREATH: 0
SENSORY CHANGE: 0
DIAPHORESIS: 0
FEVER: 0
FOCAL WEAKNESS: 0
DIZZINESS: 0
BLURRED VISION: 0
CHILLS: 0
CONSTIPATION: 1
VOMITING: 0
BRUISES/BLEEDS EASILY: 0
HEADACHES: 0
ABDOMINAL PAIN: 0
NAUSEA: 0
COUGH: 0
BLOOD IN STOOL: 0
HEARTBURN: 0
MYALGIAS: 0
DOUBLE VISION: 0

## 2025-04-21 ASSESSMENT — FIBROSIS 4 INDEX
FIB4 SCORE: 1.68
FIB4 SCORE: 1.83
FIB4 SCORE: 1.68

## 2025-04-21 ASSESSMENT — PAIN SCALES - GENERAL
PAINLEVEL_OUTOF10: 4=SLIGHT-MODERATE PAIN
PAINLEVEL_OUTOF10: 6=MODERATE PAIN

## 2025-04-21 NOTE — PROGRESS NOTES
"Pharmacy Chemotherapy Calculation:    Dx: Rectal adenocarcinoma, stage IIA, pMMR          Protocol: [id number]     *Dosing Reference*  OXALIplatin 85 mg/m2 IV over 2 hours on Day 1 infused concurrently with   Leucovorin 400 mg/m2 IV over 2 hours on Day 1   Fluorouracil 400 mg/m2 IV push followed by  Fluorouracil 2400 mg/m2 CIVI over 46-48 hours   Repeat every 14 days x 4-12 cycles or perioperative therapy (neoadjuvant or adjuvant) or until disease progression or unacceptable toxicity (advanced or metastatic)    NCCN Guidelines for Rectal Cancer V.1.2024.  De Yenifer SYLVESTER et al.J Clin Oncol.2000;18(16):2938-47.  Alan SL, et al.Br J Cancer.2002;87(4):393-9.  Licha-Prashanth F, et al. Tiana Oncol.2000;11(11);1477-83.  Abdi T et al. N Engl J Med.2004;350(23):2343-59.    Allergies:  Patient has no known allergies.     /77   Pulse 85   Temp 36.3 °C (97.3 °F) (Temporal)   Resp 18   Ht 1.803 m (5' 10.98\")   Wt 86.4 kg (190 lb 7.6 oz)   SpO2 96%   BMI 26.58 kg/m²  Body surface area is 2.08 meters squared.    Labs 4/21/25:  ANC~ 3030 Plt = 186k   Hgb = 11.3     SCr = 1 mg/dL     CrCl ~ 96 mL/min   AST/ALT/AP = WNL TBili = 0.4  K+ = 3.8     Drug Order   (Drug name, dose, route, IV Fluid & volume, frequency, number of doses) Cycle 2, Day 1      Previous treatment:C1D1 4/7/25; s/p capecitabine + XRT     Medication = OXALIplatin  Base Dose = 85 mg/m2  Calc Dose: Base Dose x 2.08 m² = 176.8 mg  Final Dose = 180 mg  Route = IV  Fluid & Volume = D5W 250 mL  Admin Duration = Over 2 hours     Infuse concurrently with LCV      <10% difference, okay to treat with final dose     Medication = Leucovorin  Base Dose = 400 mg/m2   Calc Dose: Base Dose x 2.08 m² = 832 mg  Final Dose = 850 mg  Route = IV  Fluid & Volume = D5W 250 mL  Admin Duration = Over 2 hours      Infuse concurrently with OXALIplatin      <10% difference, okay to treat with final dose     Medication = Fluorouracil  Base Dose = 400 mg/m2  Calc Dose:Base Dose " x 2.08 m² = 832 mg  Final Dose = 850 mg  Route = IV  Fluid & Volume = 50 mg/mL; 17 mL  Admin Duration = Over 5 min           <10% difference, okay to treat with final dose     Medication = Fluorouracil  Base Dose = 2400 mg/m2  Calc Dose: Base Dose x 2.08 m² = 4992 mg  Final Dose = 5350 mg  Route = CIVI via CADD pump  Fluid & Volume = 50mg/mL; 107 mL +3 mL overfill   Admin Duration = Over 46 hrs @ 2.3  ml/min          <10% difference, okay to treat with final dose       By my signature below, I confirm this process was performed independently with the BSA and all final chemotherapy dosing calculations congruent. I have reviewed the above chemotherapy order and that my calculation of the final dose and BSA (when applicable) corroborate those calculations of the  pharmacist. Discrepancies of 10% or greater in the written dose have been addressed and documented within the EPIC Progress notes.    Sabra Puentes, PharmD, BCOP

## 2025-04-21 NOTE — PROGRESS NOTES
"Pharmacy Chemotherapy Calculations    Dx: Rectal adenocarcinoma  Cycle 2  Previous treatment =C1 4/7/25    Protocol: mFOLFOX6  Oxaliplatin 85 g/m2 IV over 2 hours concurrent with  Leucovorin 400 mg/m2 IV over 2 hours followed by  Fluorouracil 400 mg/m2 IV push followed by  Fluorouracil 2400 mg/m2 CIVI over 46 hours   14-day cycle until DP/UT  NCCN Guidelines for Rectal Cancer V.1.2024.  Efrain BAKER. et al. BMC Cancer. 2007:7:91.9  Alan SL, et al. Br J Cancer. 2002:87(4):393-9.  George AP, et al. NICHOLAS. 2017:317(62):7885-8402.    Allergies:  Patient has no known allergies.       /77   Pulse 85   Temp 36.3 °C (97.3 °F) (Temporal)   Resp 18   Ht 1.803 m (5' 10.98\")   Wt 86.4 kg (190 lb 7.6 oz)   SpO2 96%   BMI 26.58 kg/m²  Body surface area is 2.08 meters squared.    All lab results 4/21/25 within treatment parameters.     Oxaliplatin 85 mg/m² x 2.08 m² = 176.8 mg   <10% difference, OK to treat with final dose = 180 mg    Leucovorin 400 mg/m² x 2.08 m² = 832 mg   <10% difference, OK to treat with final dose = 850 mg    Fluorouracil 400 mg/m² x 2.08 m² = 832 mg   <10% difference, OK to treat with final dose = 850 mg    Fluorouracil 2400 mg/m² x 2.08 m² = 4992 mg  <10% difference, OK to treat with final dose = 5350 mg CIVI over 46 hours at 2.3 mL/hr    Dank Sandoval, PharmD  "

## 2025-04-21 NOTE — PROGRESS NOTES
Chemotherapy Verification - SECONDARY RN       Height = 180.3 cm  Weight = 86.4 kg  BSA = 2.08 m2       Medication: oxaliplatin  Dose: 85 mg/m2  Calculated Dose: 176.8 mg                             (In mg/m2, AUC, mg/kg)     Medication: 5FU bolus inj.  Dose: 400 mg/m2  Calculated Dose: 832 mg                             (In mg/m2, AUC, mg/kg)    Medication: 5FU CADD pump  Dose: 2400 mg/m2  Calculated Dose: 4992 mg                             (In mg/m2, AUC, mg/kg)      I confirm that this process was performed independently.

## 2025-04-21 NOTE — PROGRESS NOTES
Chemotherapy Verification - PRIMARY RN      Height = 180.3cm  Weight = 86.4 kg  BSA = 2.08 m2       Medication: oxaliplatin  Dose: 85mg/m2  Calculated Dose: 176.8mg                             (In mg/m2, AUC, mg/kg)     Medication: leucovorin  Dose: 400mg/m2  Calculated Dose: 832 mg                             (In mg/m2, AUC, mg/kg)    Medication: fluorouracil bolus  Dose: 400mg/m2  Calculated Dose: 832mg                             (In mg/m2, AUC, mg/kg)    Medication: fluorouracil CADD pump  Dose: 2400mg/m2  Calculated Dose: 4992 mg over 46 hr.                             (In mg/m2, AUC, mg/kg)        I confirm this process was performed independently with the BSA and all final chemotherapy dosing calculations congruent.  Any discrepancies of 10% or greater have been addressed with the chemotherapy pharmacist. The resolution of the discrepancy has been documented in the EPIC progress notes.

## 2025-04-21 NOTE — PROGRESS NOTES
Pt arrives to medical oncology for port accesswith labs.  Right chest port accessed in sterile manner. Brisk blood return obtained. Labs collected.  Port flushed per [facility] policy with 20ml NS. Port remains accessed for treatment scheduled today with Infusion Services.  Patient remains in room for follow up with Jenna Cedillo PA-C.

## 2025-04-22 NOTE — PROGRESS NOTES
Patient arrived to [facility] for Day 1 Cycle 2 of mFOLFOX, no complaints at this time. Port access in 's office and labs were collected.   Port flushed with brisk blood return noted. Lab results reviewed, results within parameters for treatment today. Pre-medications given per MAR, Oxaliplatin and luecovorin administered concurrently per MAR all well tolerated, port flushed with brisk blood return noted. 5FU bolus given per MAR, 5FU CADD pump connected with all clamps open and pump running. Next appointment confirmed, patient left home in stable condition.

## 2025-04-23 ENCOUNTER — OUTPATIENT INFUSION SERVICES (OUTPATIENT)
Dept: ONCOLOGY | Facility: MEDICAL CENTER | Age: 60
End: 2025-04-23
Attending: STUDENT IN AN ORGANIZED HEALTH CARE EDUCATION/TRAINING PROGRAM
Payer: MEDICARE

## 2025-04-23 VITALS
SYSTOLIC BLOOD PRESSURE: 140 MMHG | TEMPERATURE: 96.9 F | RESPIRATION RATE: 18 BRPM | OXYGEN SATURATION: 97 % | DIASTOLIC BLOOD PRESSURE: 84 MMHG | HEART RATE: 86 BPM

## 2025-04-23 DIAGNOSIS — C20 RECTAL CANCER (HCC): ICD-10-CM

## 2025-04-23 PROCEDURE — 96523 IRRIG DRUG DELIVERY DEVICE: CPT

## 2025-04-23 ASSESSMENT — PAIN DESCRIPTION - PAIN TYPE: TYPE: CHRONIC PAIN

## 2025-04-24 NOTE — PROGRESS NOTES
Here for 5 FU pump disconnect after 46 hrs of continuous infusion. See chemotherapy flow sheet for volume / dose administration. Port had positive blood return after, saline flushed per [facility] policy, de-accessed, needle intact, insertion site covered with sterile gauze and paper tape. Pt has future appointments. Pt discharged to home in good condition.

## 2025-04-29 ENCOUNTER — HOSPITAL ENCOUNTER (OUTPATIENT)
Dept: RADIATION ONCOLOGY | Facility: MEDICAL CENTER | Age: 60
End: 2025-04-29
Attending: RADIOLOGY
Payer: MEDICARE

## 2025-04-29 DIAGNOSIS — C20 RECTAL CANCER (HCC): ICD-10-CM

## 2025-04-29 NOTE — PROGRESS NOTES
Patient doing well after chemoradiation and appropriately has had 2 cycles of FOLFOX with 6 more ago we will plan to see him back in mid to late August after restaging MRI and CAT scan to review prior to surgery.

## 2025-05-01 ASSESSMENT — ENCOUNTER SYMPTOMS
DIARRHEA: 0
VOMITING: 0
SENSORY CHANGE: 0
FOCAL WEAKNESS: 0
CHILLS: 0
DOUBLE VISION: 0
DIZZINESS: 0
HEADACHES: 0
TINGLING: 1
ABDOMINAL PAIN: 0
BLOOD IN STOOL: 0
NAUSEA: 0
HEARTBURN: 0
COUGH: 0
SHORTNESS OF BREATH: 0
WEIGHT LOSS: 0
BRUISES/BLEEDS EASILY: 0
FEVER: 0
BLURRED VISION: 0
TREMORS: 0
MYALGIAS: 0
DIAPHORESIS: 0

## 2025-05-01 NOTE — PROGRESS NOTES
"Follow Up Note:  Hematology/Oncology    Current Diagnosis and Staging: rectal adenocarcinoma, [id number] stage IIA disease, pMMR.   Date of Diagnosis: dec 2024    Chief Complaint: Patient seen today for evaluation prior to cycle 3 for continued monitoring of symptoms and side effects of cancer treatments.     Care Team:   Pcp Not In Computer  Dr Nesbitt    Oncology History of Presenting Illness:   Per Dr Nesbitt \"Martínez Lyons Jr. is a 59 y.o.  man who presents to the clinic for evaluation for systemic therapy for a new diagnosis of rectal adenocarcinoma. He had been having blood in his stool with irregular bowel habits for 1 year, and finally went to be evaluated. He had never had a colonoscopy before now. His scope revealed a mass in the rectum, and subsequent MRI rectal protocol revealed a cT3N0 disease. Flexible sigmoidoscopy with repeat biopsy revealed invasive moderately differentiated adenocarcinoma, pMMR. He was referred for evaluation accordingly.\"  He initiated Capecitabine with concurrent XR on 2/10/25 , followed by FOLFOX and then surgical evaluation     Current Treatment: FOLFOX6  Treatment History:   02/10/25-03/24/25: IMRT + xeloda  04/07/25: C1 FOLFOX6  04/21/25: C2 FOLFOX6   05/05/25: C3 FOLFOX6 (scheduled)    Interval History Mamadou MONROE:  Martínez returns to clinic today for a pretreatment appointment. He reports stable cold sensitivity in fingers & mouth (lasts ~1wk post Tx), and persistent fatigue. He is otherwise tolerating his regimen well. No abnormal bleeding or easy bruising.    Allergies as of 05/05/2025    (No Known Allergies)       Current Outpatient Medications:     losartan-hydrochlorothiazide (HYZAAR) 100-12.5 MG per tablet, Take 1 Tablet by mouth every day., Disp: , Rfl:     Capecitabine (XELODA PO), Take 1,600 mg by mouth. Takes on RT days (Patient not taking: Reported on 4/21/2025), Disp: , Rfl:     silver sulfADIAZINE (SILVADENE) 1 % Cream, Apply 1/8 inch layer to affected " "area twice a day (Patient not taking: Reported on 4/21/2025), Disp: 400 g, Rfl: 2    ondansetron (ZOFRAN) 4 MG Tab tablet, Take 1 Tablet by mouth every four hours as needed for Nausea/Vomiting., Disp: 20 Tablet, Rfl: 3    prochlorperazine (COMPAZINE) 10 MG Tab, Take 1 Tablet by mouth every 6 hours as needed for Nausea/Vomiting., Disp: 30 Tablet, Rfl: 3    acetaminophen (TYLENOL) 325 MG Tab, Take 650 mg by mouth every four hours as needed for Mild Pain., Disp: , Rfl:     losartan (COZAAR) 50 MG Tab, Take 1 Tablet by mouth every day. (Patient not taking: Reported on 3/24/2025), Disp: 60 Tablet, Rfl: 1  Review of Systems:  Review of Systems   Constitutional:  Positive for malaise/fatigue (mild, able to walk his dog few times per day 30 min instead of 90). Negative for chills, diaphoresis, fever and weight loss.   HENT:  Negative for tinnitus.    Eyes:  Negative for blurred vision and double vision.   Respiratory:  Negative for cough and shortness of breath.    Cardiovascular:  Negative for chest pain.   Gastrointestinal:  Negative for abdominal pain, blood in stool, constipation (mild), diarrhea, heartburn, melena, nausea and vomiting.   Genitourinary:  Negative for dysuria and hematuria.   Musculoskeletal:  Negative for myalgias.   Skin:  Negative for rash.        Dry, peeling skin on hands     Neurological:  Positive for tingling. Negative for dizziness, tremors, sensory change, focal weakness and headaches.   Endo/Heme/Allergies:  Does not bruise/bleed easily.     Physical Exam:  Vitals:    05/05/25 1318   BP: (!) 150/80   BP Location: Right arm   Patient Position: Sitting   BP Cuff Size: Adult   Pulse: 60   Resp: 18   Temp: 36.8 °C (98.2 °F)   TempSrc: Temporal   SpO2: 96%   Weight: 86.6 kg (191 lb)   Height: 1.805 m (5' 11.06\")       Karnofsky Performance Status: 100  Physical Exam  Constitutional:       Appearance: Normal appearance.   Cardiovascular:      Rate and Rhythm: Normal rate and regular rhythm.      " Heart sounds: Normal heart sounds.   Pulmonary:      Effort: Pulmonary effort is normal.      Breath sounds: Normal breath sounds.   Abdominal:      General: Abdomen is flat. Bowel sounds are normal.      Palpations: Abdomen is soft.   Skin:     Comments: Mild dry skin/peeling on palms of hands bilaterally, no signs of infx, erythema or break in skin     Neurological:      Mental Status: He is alert and oriented to person, place, and time.      Sensory: No sensory deficit (sensation diminished throughout palms of both hands).      Motor: No weakness, tremor or atrophy.      Coordination: Coordination normal. Rapid alternating movements normal.   Psychiatric:         Behavior: Behavior normal.       Labs:   Latest Reference Range & Units 05/05/25 11:35   WBC 4.8 - 10.8 K/uL 3.3 (L)   RBC 4.70 - 6.10 M/uL 3.68 (L)   Hemoglobin 14.0 - 18.0 g/dL 11.7 (L)   Hematocrit 42.0 - 52.0 % 33.0 (L)   MCV 81.4 - 97.8 fL 89.7   MCH 27.0 - 33.0 pg 31.8   MCHC 32.3 - 36.5 g/dL 35.5   RDW 35.9 - 50.0 fL 56.2 (H)   Platelet Count 164 - 446 K/uL 145 (L)   MPV 9.0 - 12.9 fL 8.9 (L)   Neutrophils-Polys 44.00 - 72.00 % 64.20   Neutrophils (Absolute) 1.82 - 7.42 K/uL 2.12   Lymphocytes 22.00 - 41.00 % 17.30 (L)   Lymphs (Absolute) 1.00 - 4.80 K/uL 0.57 (L)   Monocytes 0.00 - 13.40 % 16.40 (H)   Monos (Absolute) 0.00 - 0.85 K/uL 0.54   Eosinophils 0.00 - 6.90 % 0.90   Eos (Absolute) 0.00 - 0.51 K/uL 0.03   Basophils 0.00 - 1.80 % 0.90   Baso (Absolute) 0.00 - 0.12 K/uL 0.03   Immature Granulocytes 0.00 - 0.90 % 0.30   Immature Granulocytes (abs) 0.00 - 0.11 K/uL 0.01   Nucleated RBC 0.00 - 0.20 /100 WBC 0.00   NRBC (Absolute) K/uL 0.00   Sodium 135 - 145 mmol/L 139   Potassium 3.6 - 5.5 mmol/L 3.9   Chloride 96 - 112 mmol/L 105   Co2 20 - 33 mmol/L 26   Anion Gap 7.0 - 16.0  8.0   Glucose 65 - 99 mg/dL 96   Bun 8 - 22 mg/dL 16   Creatinine 0.50 - 1.40 mg/dL 0.98   GFR (CKD-EPI) >60 mL/min/1.73 m 2 88   Calcium 8.5 - 10.5 mg/dL 9.2   Correct  Calcium 8.5 - 10.5 mg/dL 9.3   AST(SGOT) 12 - 45 U/L 23   ALT(SGPT) 2 - 50 U/L 21   Alkaline Phosphatase 30 - 99 U/L 77   Total Bilirubin 0.1 - 1.5 mg/dL 0.5   Albumin 3.2 - 4.9 g/dL 3.9   Total Protein 6.0 - 8.2 g/dL 6.4   Globulin 1.9 - 3.5 g/dL 2.5   A-G Ratio g/dL 1.6     Imaging: last CT CAP 12/27/24      Assessment & Plan:  1. Rectal cancer (HCC)        2. Encounter for long-term current use of high risk medication        3. Encounter for chemotherapy management        4. Oncology follow-up encounter            Rectal adenocarcinoma, [id number] stage IIA disease, pMMR  .Dx Dec 2024, s/p chemoRT, currently on FOLFOX  Stability of condition: stable    Treatment Plan: FOLFOX6  Patient is meeting criteria to proceed with Cycle 3 of 8, based on the clinical and laboratory information available to me at this time.   Encouraged unscented hand lotion for peeling  Pt will monitor his cold sensitivity closely & notify us if it progresses, pt has baseline neuropathy/diminished sensation in hands    2. Encounter for High Risk Medication Use  Toxicity: Patient is getting antineoplastic therapy and needs monitoring of blood counts, hepatic function, and renal function due to potential for organ dysfunction. Appropriate lab work has been ordered per treatment plan.   - CBC w/ diff, CMP, CEA    Future Imaging: Repeat MRI rectal protocol and CT c/a/p after completion of therapy   Return for Follow Up: 2 weeks for prechemo/RN port lab draw, next apt with Dr Nesbitt    Any questions and concerns raised by the patient were answered to the best of my ability. Thank you for allowing me to participate in the care for this patient. Please feel free to contact me for any questions or concerns.   Total time spent on chart review, clinic encounter, documentation, coordination of care: 30 minutes.   Please note that this dictation was created using voice recognition software. I have made every reasonable attempt to correct obvious errors, but  I expect that there are errors of grammar and possibly content that I did not discover before finalizing the note.

## 2025-05-02 RX ORDER — EPINEPHRINE 1 MG/ML(1)
0.5 AMPUL (ML) INJECTION PRN
Status: CANCELLED | OUTPATIENT
Start: 2025-05-05

## 2025-05-02 RX ORDER — METHYLPREDNISOLONE SODIUM SUCCINATE 125 MG/2ML
125 INJECTION, POWDER, LYOPHILIZED, FOR SOLUTION INTRAMUSCULAR; INTRAVENOUS PRN
Status: CANCELLED | OUTPATIENT
Start: 2025-05-05

## 2025-05-02 RX ORDER — 0.9 % SODIUM CHLORIDE 0.9 %
3 VIAL (ML) INJECTION PRN
Status: CANCELLED | OUTPATIENT
Start: 2025-05-05

## 2025-05-02 RX ORDER — DIPHENHYDRAMINE HYDROCHLORIDE 50 MG/ML
50 INJECTION, SOLUTION INTRAMUSCULAR; INTRAVENOUS PRN
Status: CANCELLED | OUTPATIENT
Start: 2025-05-05

## 2025-05-02 RX ORDER — 0.9 % SODIUM CHLORIDE 0.9 %
3 VIAL (ML) INJECTION PRN
Status: CANCELLED | OUTPATIENT
Start: 2025-05-04

## 2025-05-02 RX ORDER — 0.9 % SODIUM CHLORIDE 0.9 %
10 VIAL (ML) INJECTION PRN
Status: CANCELLED | OUTPATIENT
Start: 2025-05-05

## 2025-05-02 RX ORDER — 0.9 % SODIUM CHLORIDE 0.9 %
VIAL (ML) INJECTION PRN
Status: CANCELLED | OUTPATIENT
Start: 2025-05-05

## 2025-05-02 RX ORDER — ONDANSETRON 8 MG/1
8 TABLET, ORALLY DISINTEGRATING ORAL PRN
Status: CANCELLED | OUTPATIENT
Start: 2025-05-05

## 2025-05-02 RX ORDER — PALONOSETRON 0.05 MG/ML
0.25 INJECTION, SOLUTION INTRAVENOUS ONCE
Status: CANCELLED | OUTPATIENT
Start: 2025-05-05 | End: 2025-05-05

## 2025-05-02 RX ORDER — 0.9 % SODIUM CHLORIDE 0.9 %
20 VIAL (ML) INJECTION PRN
Status: CANCELLED | OUTPATIENT
Start: 2025-05-07

## 2025-05-02 RX ORDER — DEXTROSE MONOHYDRATE 50 MG/ML
INJECTION, SOLUTION INTRAVENOUS CONTINUOUS
Status: CANCELLED | OUTPATIENT
Start: 2025-05-05

## 2025-05-02 RX ORDER — ONDANSETRON 2 MG/ML
4 INJECTION INTRAMUSCULAR; INTRAVENOUS PRN
Status: CANCELLED | OUTPATIENT
Start: 2025-05-05

## 2025-05-02 RX ORDER — 0.9 % SODIUM CHLORIDE 0.9 %
10 VIAL (ML) INJECTION PRN
Status: CANCELLED | OUTPATIENT
Start: 2025-05-04

## 2025-05-02 RX ORDER — DEXAMETHASONE SODIUM PHOSPHATE 4 MG/ML
12 INJECTION, SOLUTION INTRA-ARTICULAR; INTRALESIONAL; INTRAMUSCULAR; INTRAVENOUS; SOFT TISSUE ONCE
Status: CANCELLED | OUTPATIENT
Start: 2025-05-05 | End: 2025-05-05

## 2025-05-02 RX ORDER — 0.9 % SODIUM CHLORIDE 0.9 %
VIAL (ML) INJECTION PRN
Status: CANCELLED | OUTPATIENT
Start: 2025-05-04

## 2025-05-02 RX ORDER — PROCHLORPERAZINE MALEATE 10 MG
10 TABLET ORAL EVERY 6 HOURS PRN
Status: CANCELLED | OUTPATIENT
Start: 2025-05-05

## 2025-05-04 NOTE — PROGRESS NOTES
"Pharmacy Chemotherapy Calculations    Dx: Rectal adenocarcinoma  Cycle 3  Previous treatment =C2 4/21/25    Protocol: mFOLFOX6  Oxaliplatin 85 g/m2 IV over 2 hours concurrent with  Leucovorin 400 mg/m2 IV over 2 hours followed by  Fluorouracil 400 mg/m2 IV push followed by  Fluorouracil 2400 mg/m2 CIVI over 46 hours   14-day cycle until DP/UT  NCCN Guidelines for Rectal Cancer V.1.2024.  Efrain BAKER. et al. BMC Cancer. 2007:7:91.9  Alan SL, et al. Br J Cancer. 2002:87(4):393-9.  George AP, et al. NICHOLAS. 2017:317(23):3808-7421.    Allergies:  Patient has no known allergies.       BP (!) 144/82   Pulse 70   Temp 36 °C (96.8 °F) (Temporal)   Resp 16   Ht 1.803 m (5' 10.98\")   Wt 86.9 kg (191 lb 9.3 oz)   SpO2 96%   BMI 26.73 kg/m²  Body surface area is 2.09 meters squared.    All lab results 5/5/25 within treatment parameters.     Oxaliplatin 85 mg/m² x 2.09 m² = 178 mg   <10% difference, OK to treat with final dose = 180 mg    Leucovorin 400 mg/m² x 2.09 m² = 836 mg   <10% difference, OK to treat with final dose = 850 mg    Fluorouracil 400 mg/m² x 2.09 m² = 836 mg   <10% difference, OK to treat with final dose = 850 mg    Fluorouracil 2400 mg/m² x 2.09 m² = 5016 mg  <10% difference, OK to treat with final dose = 5350 mg CIVI over 46 hours at 2.3 mL/hr    Dank Sandoval, PharmD  "

## 2025-05-05 ENCOUNTER — HOSPITAL ENCOUNTER (OUTPATIENT)
Facility: MEDICAL CENTER | Age: 60
End: 2025-05-05
Attending: STUDENT IN AN ORGANIZED HEALTH CARE EDUCATION/TRAINING PROGRAM
Payer: MEDICARE

## 2025-05-05 ENCOUNTER — HOSPITAL ENCOUNTER (OUTPATIENT)
Dept: HEMATOLOGY ONCOLOGY | Facility: MEDICAL CENTER | Age: 60
End: 2025-05-05
Attending: STUDENT IN AN ORGANIZED HEALTH CARE EDUCATION/TRAINING PROGRAM
Payer: MEDICARE

## 2025-05-05 ENCOUNTER — OUTPATIENT INFUSION SERVICES (OUTPATIENT)
Dept: ONCOLOGY | Facility: MEDICAL CENTER | Age: 60
End: 2025-05-05
Attending: STUDENT IN AN ORGANIZED HEALTH CARE EDUCATION/TRAINING PROGRAM
Payer: MEDICARE

## 2025-05-05 ENCOUNTER — HOSPITAL ENCOUNTER (OUTPATIENT)
Dept: HEMATOLOGY ONCOLOGY | Facility: MEDICAL CENTER | Age: 60
End: 2025-05-05
Attending: PHYSICIAN ASSISTANT
Payer: MEDICARE

## 2025-05-05 ENCOUNTER — DOCUMENTATION (OUTPATIENT)
Dept: ONCOLOGY | Facility: MEDICAL CENTER | Age: 60
End: 2025-05-05

## 2025-05-05 VITALS
BODY MASS INDEX: 26.74 KG/M2 | OXYGEN SATURATION: 96 % | RESPIRATION RATE: 18 BRPM | HEIGHT: 71 IN | HEART RATE: 60 BPM | TEMPERATURE: 98.2 F | SYSTOLIC BLOOD PRESSURE: 150 MMHG | DIASTOLIC BLOOD PRESSURE: 80 MMHG | WEIGHT: 191 LBS

## 2025-05-05 VITALS
HEIGHT: 71 IN | SYSTOLIC BLOOD PRESSURE: 144 MMHG | DIASTOLIC BLOOD PRESSURE: 82 MMHG | WEIGHT: 191.58 LBS | HEART RATE: 70 BPM | TEMPERATURE: 96.8 F | BODY MASS INDEX: 26.82 KG/M2 | RESPIRATION RATE: 16 BRPM | OXYGEN SATURATION: 96 %

## 2025-05-05 VITALS
WEIGHT: 191.2 LBS | TEMPERATURE: 98.2 F | BODY MASS INDEX: 26.68 KG/M2 | OXYGEN SATURATION: 96 % | DIASTOLIC BLOOD PRESSURE: 82 MMHG | SYSTOLIC BLOOD PRESSURE: 153 MMHG | HEART RATE: 60 BPM | RESPIRATION RATE: 18 BRPM

## 2025-05-05 DIAGNOSIS — Z09 ONCOLOGY FOLLOW-UP ENCOUNTER: ICD-10-CM

## 2025-05-05 DIAGNOSIS — Z51.11 ENCOUNTER FOR CHEMOTHERAPY MANAGEMENT: ICD-10-CM

## 2025-05-05 DIAGNOSIS — C20 RECTAL CANCER (HCC): ICD-10-CM

## 2025-05-05 DIAGNOSIS — K62.89 RECTAL MASS: ICD-10-CM

## 2025-05-05 DIAGNOSIS — Z79.899 ENCOUNTER FOR LONG-TERM CURRENT USE OF HIGH RISK MEDICATION: ICD-10-CM

## 2025-05-05 DIAGNOSIS — F41.9 ANXIETY: ICD-10-CM

## 2025-05-05 LAB
ALBUMIN SERPL BCP-MCNC: 3.9 G/DL (ref 3.2–4.9)
ALBUMIN/GLOB SERPL: 1.6 G/DL
ALP SERPL-CCNC: 77 U/L (ref 30–99)
ALT SERPL-CCNC: 21 U/L (ref 2–50)
ANION GAP SERPL CALC-SCNC: 8 MMOL/L (ref 7–16)
AST SERPL-CCNC: 23 U/L (ref 12–45)
BASOPHILS # BLD AUTO: 0.9 % (ref 0–1.8)
BASOPHILS # BLD: 0.03 K/UL (ref 0–0.12)
BILIRUB SERPL-MCNC: 0.5 MG/DL (ref 0.1–1.5)
BUN SERPL-MCNC: 16 MG/DL (ref 8–22)
CALCIUM ALBUM COR SERPL-MCNC: 9.3 MG/DL (ref 8.5–10.5)
CALCIUM SERPL-MCNC: 9.2 MG/DL (ref 8.5–10.5)
CEA SERPL-MCNC: 1.8 NG/ML (ref 0–3)
CHLORIDE SERPL-SCNC: 105 MMOL/L (ref 96–112)
CO2 SERPL-SCNC: 26 MMOL/L (ref 20–33)
CREAT SERPL-MCNC: 0.98 MG/DL (ref 0.5–1.4)
EOSINOPHIL # BLD AUTO: 0.03 K/UL (ref 0–0.51)
EOSINOPHIL NFR BLD: 0.9 % (ref 0–6.9)
ERYTHROCYTE [DISTWIDTH] IN BLOOD BY AUTOMATED COUNT: 56.2 FL (ref 35.9–50)
GFR SERPLBLD CREATININE-BSD FMLA CKD-EPI: 88 ML/MIN/1.73 M 2
GLOBULIN SER CALC-MCNC: 2.5 G/DL (ref 1.9–3.5)
GLUCOSE SERPL-MCNC: 96 MG/DL (ref 65–99)
HCT VFR BLD AUTO: 33 % (ref 42–52)
HGB BLD-MCNC: 11.7 G/DL (ref 14–18)
IMM GRANULOCYTES # BLD AUTO: 0.01 K/UL (ref 0–0.11)
IMM GRANULOCYTES NFR BLD AUTO: 0.3 % (ref 0–0.9)
LYMPHOCYTES # BLD AUTO: 0.57 K/UL (ref 1–4.8)
LYMPHOCYTES NFR BLD: 17.3 % (ref 22–41)
MCH RBC QN AUTO: 31.8 PG (ref 27–33)
MCHC RBC AUTO-ENTMCNC: 35.5 G/DL (ref 32.3–36.5)
MCV RBC AUTO: 89.7 FL (ref 81.4–97.8)
MONOCYTES # BLD AUTO: 0.54 K/UL (ref 0–0.85)
MONOCYTES NFR BLD AUTO: 16.4 % (ref 0–13.4)
NEUTROPHILS # BLD AUTO: 2.12 K/UL (ref 1.82–7.42)
NEUTROPHILS NFR BLD: 64.2 % (ref 44–72)
NRBC # BLD AUTO: 0 K/UL
NRBC BLD-RTO: 0 /100 WBC (ref 0–0.2)
PLATELET # BLD AUTO: 145 K/UL (ref 164–446)
PMV BLD AUTO: 8.9 FL (ref 9–12.9)
POTASSIUM SERPL-SCNC: 3.9 MMOL/L (ref 3.6–5.5)
PROT SERPL-MCNC: 6.4 G/DL (ref 6–8.2)
RBC # BLD AUTO: 3.68 M/UL (ref 4.7–6.1)
SODIUM SERPL-SCNC: 139 MMOL/L (ref 135–145)
WBC # BLD AUTO: 3.3 K/UL (ref 4.8–10.8)

## 2025-05-05 PROCEDURE — 96411 CHEMO IV PUSH ADDL DRUG: CPT

## 2025-05-05 PROCEDURE — 700111 HCHG RX REV CODE 636 W/ 250 OVERRIDE (IP): Performed by: NURSE PRACTITIONER

## 2025-05-05 PROCEDURE — 80053 COMPREHEN METABOLIC PANEL: CPT

## 2025-05-05 PROCEDURE — 96415 CHEMO IV INFUSION ADDL HR: CPT

## 2025-05-05 PROCEDURE — 96413 CHEMO IV INFUSION 1 HR: CPT

## 2025-05-05 PROCEDURE — 96375 TX/PRO/DX INJ NEW DRUG ADDON: CPT

## 2025-05-05 PROCEDURE — 96367 TX/PROPH/DG ADDL SEQ IV INF: CPT

## 2025-05-05 PROCEDURE — A4212 NON CORING NEEDLE OR STYLET: HCPCS

## 2025-05-05 PROCEDURE — 99212 OFFICE O/P EST SF 10 MIN: CPT | Performed by: PHYSICIAN ASSISTANT

## 2025-05-05 PROCEDURE — 82378 CARCINOEMBRYONIC ANTIGEN: CPT

## 2025-05-05 PROCEDURE — G0498 CHEMO EXTEND IV INFUS W/PUMP: HCPCS

## 2025-05-05 PROCEDURE — 85025 COMPLETE CBC W/AUTO DIFF WBC: CPT

## 2025-05-05 PROCEDURE — 99214 OFFICE O/P EST MOD 30 MIN: CPT | Performed by: PHYSICIAN ASSISTANT

## 2025-05-05 PROCEDURE — 700105 HCHG RX REV CODE 258: Performed by: NURSE PRACTITIONER

## 2025-05-05 PROCEDURE — 36591 DRAW BLOOD OFF VENOUS DEVICE: CPT

## 2025-05-05 RX ORDER — PALONOSETRON 0.05 MG/ML
0.25 INJECTION, SOLUTION INTRAVENOUS ONCE
Status: COMPLETED | OUTPATIENT
Start: 2025-05-05 | End: 2025-05-05

## 2025-05-05 RX ORDER — DEXAMETHASONE SODIUM PHOSPHATE 4 MG/ML
12 INJECTION, SOLUTION INTRA-ARTICULAR; INTRALESIONAL; INTRAMUSCULAR; INTRAVENOUS; SOFT TISSUE ONCE
Status: COMPLETED | OUTPATIENT
Start: 2025-05-05 | End: 2025-05-05

## 2025-05-05 RX ADMIN — FOSAPREPITANT 150 MG: 150 INJECTION, POWDER, LYOPHILIZED, FOR SOLUTION INTRAVENOUS at 13:53

## 2025-05-05 RX ADMIN — FLUOROURACIL 850 MG: 50 INJECTION, SOLUTION INTRAVENOUS at 16:54

## 2025-05-05 RX ADMIN — LEUCOVORIN CALCIUM 850 MG: 500 INJECTION, POWDER, LYOPHILIZED, FOR SOLUTION INTRAMUSCULAR; INTRAVENOUS at 14:23

## 2025-05-05 RX ADMIN — OXALIPLATIN 180 MG: 5 INJECTION, SOLUTION INTRAVENOUS at 14:25

## 2025-05-05 RX ADMIN — FLUOROURACIL 5350 MG: 50 INJECTION, SOLUTION INTRAVENOUS at 16:54

## 2025-05-05 RX ADMIN — DEXAMETHASONE SODIUM PHOSPHATE 12 MG: 4 INJECTION INTRA-ARTICULAR; INTRALESIONAL; INTRAMUSCULAR; INTRAVENOUS; SOFT TISSUE at 13:53

## 2025-05-05 RX ADMIN — PALONOSETRON HYDROCHLORIDE 0.25 MG: 0.25 INJECTION INTRAVENOUS at 13:53

## 2025-05-05 ASSESSMENT — FIBROSIS 4 INDEX
FIB4 SCORE: 2.35
FIB4 SCORE: 2.08
FIB4 SCORE: 2.08

## 2025-05-05 ASSESSMENT — PAIN SCALES - GENERAL: PAINLEVEL_OUTOF10: NO PAIN

## 2025-05-05 ASSESSMENT — ENCOUNTER SYMPTOMS: CONSTIPATION: 0

## 2025-05-05 ASSESSMENT — PAIN DESCRIPTION - PAIN TYPE: TYPE: CHRONIC PAIN

## 2025-05-05 NOTE — PROGRESS NOTES
Nutrition Services: Brief Update      Martínez Lyons Jr. is a 60 y.o. male with diagnosis of rectal cancer    Wt Readings from Last 7 Encounters:   05/05/25 86.9 kg (191 lb 9.3 oz)   05/05/25 86.6 kg (191 lb)   05/05/25 86.7 kg (191 lb 3.2 oz)   04/21/25 86.2 kg (190 lb)   04/21/25 86.2 kg (190 lb)   04/21/25 86.4 kg (190 lb 7.6 oz)   04/07/25 86 kg (189 lb 9.5 oz)     Weight Change: wt stable     Pertinent Recent Lab work (5/5/25): reviewed    RD able to visit pt chairside in [facility]. Pt states is managing quite well. Denies n/v/d, states has constipation for a few days after his infusion, though this resolves with the use of miralax. Maintains good appetite. Denies any concerns or questions for RD at this time.     Plan/Recommend:  Encouraged pt to reach out to RD if any questions or concerns arise. RD provided copy of contact information     Pt verbalizes understanding and is receptive to information provided.   RD available PRN.  [phone]

## 2025-05-05 NOTE — PROGRESS NOTES
Chemotherapy Verification - PRIMARY RN      Height = 1.803m  Weight = 86.9kg  BSA = 2.09m2       Medication: oxaliplatin (Eloxatin)  Dose: 85mg/m2  Calculated Dose: 177.65mg                             (In mg/m2, AUC, mg/kg)     Medication: fluorouracil (Adrucil) BOLUS  Dose: 400mg/m2  Calculated Dose: 836mg                             (In mg/m2, AUC, mg/kg)    Medication: fluorouracil (Adrucil) in CADD pump over 46 hours  Dose: 2400mg/m2  Calculated Dose: 5016mg                             (In mg/m2, AUC, mg/kg)          I confirm this process was performed independently with the BSA and all final chemotherapy dosing calculations congruent.  Any discrepancies of 10% or greater have been addressed with the chemotherapy pharmacist. The resolution of the discrepancy has been documented in the EPIC progress notes.

## 2025-05-05 NOTE — PROGRESS NOTES
Pt arrives to medical oncology for port flush with labs.  Patient with emla cream applied to right port.  Port accessed in sterile manner with sterile technique and low profile needle for treatment today. Brisk blood return obtained. Labs collected.  Port secured with tegaderm.  Patient will return to clinic for 1300 appt with Jenna Cedillo PA-C.

## 2025-05-05 NOTE — PROGRESS NOTES
Chemotherapy Verification - SECONDARY RN       Height = 180.3 cm  Weight = 86.9 kg  BSA = 2.09 m2       Medication: oxaliplatin  Dose: 85 mg/m2  Calculated Dose: 177.65 mg                             (In mg/m2, AUC, mg/kg)     Medication: 5FU bolus inj.  Dose: 400 mg/m2  Calculated Dose: 836 mg                             (In mg/m2, AUC, mg/kg)    Medication: 5FU CADD pump  Dose: 2400 mg/m2  Calculated Dose: 5016 mg                             (In mg/m2, AUC, mg/kg)    I confirm that this process was performed independently.

## 2025-05-05 NOTE — PROGRESS NOTES
"Pharmacy Chemotherapy Calculation:    Dx: Rectal adenocarcinoma, stage IIA, pMMR          Protocol: mFOLFOX6     *Dosing Reference*  OXALIplatin 85 mg/m2 IV over 2 hours on Day 1 infused concurrently with   Leucovorin 400 mg/m2 IV over 2 hours on Day 1   Fluorouracil 400 mg/m2 IV push followed by  Fluorouracil 2400 mg/m2 CIVI over 46-48 hours   Repeat every 14 days x 4-12 cycles or perioperative therapy (neoadjuvant or adjuvant) or until disease progression or unacceptable toxicity (advanced or metastatic)    NCCN Guidelines for Rectal Cancer V.1.2024.  De Yenifer SYLVESTER et al.J Clin Oncol.2000;18(16):2938-47.  Alan SL, et al.Br J Cancer.2002;87(4):393-9.  Licha-Prashanth F, et al. Tiana Oncol.2000;11(11);1477-83.  Abdi T et al. N Engl J Med.2004;350(23):2343-02.    Allergies:  Patient has no known allergies.     BP (!) 144/82   Pulse 70   Temp 36 °C (96.8 °F) (Temporal)   Resp 16   Ht 1.803 m (5' 10.98\")   Wt 86.9 kg (191 lb 9.3 oz)   SpO2 96%   BMI 26.73 kg/m²  Body surface area is 2.09 meters squared.    Labs 5/5/25:  ANC~ 2120 Plt = 145 k   Hgb = 11.7     SCr = 0.98 mg/dL CrCl ~ 98 mL/min   AST/ALT/AP = WNL TBili = 0.5  K+ = 3.9     Drug Order   (Drug name, dose, route, IV Fluid & volume, frequency, number of doses) Cycle 3, Day 1      Previous treatment:C2D1 4/21/25; s/p capecitabine + XRT     Medication = OXALIplatin  Base Dose = 85 mg/m2  Calc Dose: Base Dose x 2.09 m² = 177.7 mg  Final Dose = 180 mg  Route = IV  Fluid & Volume = D5W 250 mL  Admin Duration = Over 2 hours     Infuse concurrently with LCV      <10% difference, okay to treat with final dose     Medication = Leucovorin  Base Dose = 400 mg/m2   Calc Dose: Base Dose x 2.09 m² = 836 mg  Final Dose = 850 mg  Route = IV  Fluid & Volume = D5W 250 mL  Admin Duration = Over 2 hours      Infuse concurrently with OXALIplatin      <10% difference, okay to treat with final dose     Medication = Fluorouracil  Base Dose = 400 mg/m2  Calc Dose:Base " Dose x 2.09 m² = 836 mg  Final Dose = 850 mg  Route = IV  Fluid & Volume = 50 mg/mL; 17 mL  Admin Duration = Over 5 min           <10% difference, okay to treat with final dose     Medication = Fluorouracil  Base Dose = 2400 mg/m2  Calc Dose: Base Dose x 2.09 m² = 5016 mg  Final Dose = 5350 mg  Route = CIVI via CADD pump  Fluid & Volume = 50mg/mL; 107 mL +3 mL overfill   Admin Duration = Over 46 hrs @ 2.3  ml/min          <10% difference, okay to treat with final dose       By my signature below, I confirm this process was performed independently with the BSA and all final chemotherapy dosing calculations congruent. I have reviewed the above chemotherapy order and that my calculation of the final dose and BSA (when applicable) corroborate those calculations of the  pharmacist. Discrepancies of 10% or greater in the written dose have been addressed and documented within the EPIC Progress notes.    Sabra Puentes, PharmD, BCOP

## 2025-05-06 NOTE — PROGRESS NOTES
Patient came into infusion independently. Orders and vitals reviewed, assessment done. Port accessed in MD's office and labs drawn as well. Labs reviewed, patient meets parameters to proceed with treatment today. Cycle 3 day 1 of mFOLFOX treatment given as ordered with no adverse events. Port flushed with blood return noted. Adrucil blous given. CADD pump connected and set to run over 46 hours. Patient left in stable condition with next appointment confirmed.

## 2025-05-07 ENCOUNTER — OUTPATIENT INFUSION SERVICES (OUTPATIENT)
Dept: ONCOLOGY | Facility: MEDICAL CENTER | Age: 60
End: 2025-05-07
Attending: STUDENT IN AN ORGANIZED HEALTH CARE EDUCATION/TRAINING PROGRAM
Payer: MEDICARE

## 2025-05-07 VITALS
HEART RATE: 77 BPM | OXYGEN SATURATION: 98 % | SYSTOLIC BLOOD PRESSURE: 133 MMHG | TEMPERATURE: 98 F | RESPIRATION RATE: 18 BRPM | DIASTOLIC BLOOD PRESSURE: 70 MMHG

## 2025-05-07 DIAGNOSIS — C20 RECTAL CANCER (HCC): ICD-10-CM

## 2025-05-07 PROCEDURE — 96523 IRRIG DRUG DELIVERY DEVICE: CPT

## 2025-05-07 RX ORDER — 0.9 % SODIUM CHLORIDE 0.9 %
20 VIAL (ML) INJECTION PRN
Status: DISCONTINUED | OUTPATIENT
Start: 2025-05-07 | End: 2025-05-07 | Stop reason: HOSPADM

## 2025-05-07 ASSESSMENT — PAIN DESCRIPTION - PAIN TYPE: TYPE: CHRONIC PAIN

## 2025-05-07 NOTE — PROGRESS NOTES
Pt presented to Infusion Services for 5FU pump disconnect. POC discussed and pt verbalized understanding. Pump in STOP mode and pump reads 108.4 ml given with 0 ml reservoir. Pump disconnected and port flushed per policy. Brisk blood return noted, line flushed with ease and port de-accessed with needle intact. Site covered with sterile gauze/paper tape. Pt confirmed when to contact oncologist, confirmed next chemo appt and discharged ambulatory in NAD..

## 2025-05-13 ENCOUNTER — PATIENT OUTREACH (OUTPATIENT)
Dept: ONCOLOGY | Facility: MEDICAL CENTER | Age: 60
End: 2025-05-13
Payer: MEDICARE

## 2025-05-13 RX ORDER — 0.9 % SODIUM CHLORIDE 0.9 %
VIAL (ML) INJECTION PRN
Status: CANCELLED | OUTPATIENT
Start: 2025-05-18

## 2025-05-13 RX ORDER — DEXTROSE MONOHYDRATE 50 MG/ML
INJECTION, SOLUTION INTRAVENOUS CONTINUOUS
Status: CANCELLED | OUTPATIENT
Start: 2025-05-19

## 2025-05-13 RX ORDER — 0.9 % SODIUM CHLORIDE 0.9 %
VIAL (ML) INJECTION PRN
Status: CANCELLED | OUTPATIENT
Start: 2025-05-19

## 2025-05-13 RX ORDER — 0.9 % SODIUM CHLORIDE 0.9 %
10 VIAL (ML) INJECTION PRN
Status: CANCELLED | OUTPATIENT
Start: 2025-05-19

## 2025-05-13 RX ORDER — DEXAMETHASONE SODIUM PHOSPHATE 4 MG/ML
12 INJECTION, SOLUTION INTRA-ARTICULAR; INTRALESIONAL; INTRAMUSCULAR; INTRAVENOUS; SOFT TISSUE ONCE
Status: CANCELLED | OUTPATIENT
Start: 2025-05-19 | End: 2025-05-19

## 2025-05-13 RX ORDER — 0.9 % SODIUM CHLORIDE 0.9 %
10 VIAL (ML) INJECTION PRN
Status: CANCELLED | OUTPATIENT
Start: 2025-05-18

## 2025-05-13 RX ORDER — EPINEPHRINE 1 MG/ML(1)
0.5 AMPUL (ML) INJECTION PRN
Status: CANCELLED | OUTPATIENT
Start: 2025-05-19

## 2025-05-13 RX ORDER — PROCHLORPERAZINE MALEATE 10 MG
10 TABLET ORAL EVERY 6 HOURS PRN
Status: CANCELLED | OUTPATIENT
Start: 2025-05-19

## 2025-05-13 RX ORDER — 0.9 % SODIUM CHLORIDE 0.9 %
20 VIAL (ML) INJECTION PRN
Status: CANCELLED | OUTPATIENT
Start: 2025-05-21

## 2025-05-13 RX ORDER — PALONOSETRON 0.05 MG/ML
0.25 INJECTION, SOLUTION INTRAVENOUS ONCE
Status: CANCELLED | OUTPATIENT
Start: 2025-05-19 | End: 2025-05-19

## 2025-05-13 RX ORDER — DIPHENHYDRAMINE HYDROCHLORIDE 50 MG/ML
50 INJECTION, SOLUTION INTRAMUSCULAR; INTRAVENOUS PRN
Status: CANCELLED | OUTPATIENT
Start: 2025-05-19

## 2025-05-13 RX ORDER — METHYLPREDNISOLONE SODIUM SUCCINATE 125 MG/2ML
125 INJECTION, POWDER, LYOPHILIZED, FOR SOLUTION INTRAMUSCULAR; INTRAVENOUS PRN
Status: CANCELLED | OUTPATIENT
Start: 2025-05-19

## 2025-05-13 RX ORDER — 0.9 % SODIUM CHLORIDE 0.9 %
3 VIAL (ML) INJECTION PRN
Status: CANCELLED | OUTPATIENT
Start: 2025-05-19

## 2025-05-13 RX ORDER — 0.9 % SODIUM CHLORIDE 0.9 %
3 VIAL (ML) INJECTION PRN
Status: CANCELLED | OUTPATIENT
Start: 2025-05-18

## 2025-05-13 RX ORDER — ONDANSETRON 8 MG/1
8 TABLET, ORALLY DISINTEGRATING ORAL PRN
Status: CANCELLED | OUTPATIENT
Start: 2025-05-19

## 2025-05-13 RX ORDER — ONDANSETRON 2 MG/ML
4 INJECTION INTRAMUSCULAR; INTRAVENOUS PRN
Status: CANCELLED | OUTPATIENT
Start: 2025-05-19

## 2025-05-13 NOTE — PROGRESS NOTES
On May 13, 2025, , Mary Ball, attempted to call pt. via telephone. Pt. did not answer and AMY Ball was unable to leave a voicemail for pt. AMY Ball will send pt. a [employer] message to check in.

## 2025-05-15 ASSESSMENT — ENCOUNTER SYMPTOMS
VOMITING: 0
DIARRHEA: 0
DIAPHORESIS: 0
FOCAL WEAKNESS: 0
SHORTNESS OF BREATH: 0
TREMORS: 0
NAUSEA: 0
BLOOD IN STOOL: 0
HEARTBURN: 0
BLURRED VISION: 0
FEVER: 0
MYALGIAS: 0
CHILLS: 0
DOUBLE VISION: 0
DIZZINESS: 0
HEADACHES: 0
WEIGHT LOSS: 0
BRUISES/BLEEDS EASILY: 0
SENSORY CHANGE: 0
COUGH: 0
ABDOMINAL PAIN: 0

## 2025-05-15 NOTE — PROGRESS NOTES
"Follow Up Note:  Hematology/Oncology    Current Diagnosis and Staging: rectal adenocarcinoma, hH3P5W2 stage IIA disease, pMMR.   Date of Diagnosis: dec 2024    Chief Complaint: Patient seen today for evaluation prior to cycle 4 for continued monitoring of symptoms and side effects of cancer treatments.     Care Team:   Pcp Not In Computer  Dr Nesbitt    Oncology History of Presenting Illness:   Per Dr Nesbitt \"Martínez Lyons Jr. is a 59 y.o.  man who presents to the clinic for evaluation for systemic therapy for a new diagnosis of rectal adenocarcinoma. He had been having blood in his stool with irregular bowel habits for 1 year, and finally went to be evaluated. He had never had a colonoscopy before now. His scope revealed a mass in the rectum, and subsequent MRI rectal protocol revealed a cT3N0 disease. Flexible sigmoidoscopy with repeat biopsy revealed invasive moderately differentiated adenocarcinoma, pMMR. He was referred for evaluation accordingly.\"  He initiated Capecitabine with concurrent XR on 2/10/25 , followed by FOLFOX and then surgical evaluation     Current Treatment: FOLFOX6  Treatment History:   02/10/25-03/24/25: IMRT + xeloda  04/07/25: C1 FOLFOX6  04/21/25: C2 FOLFOX6   05/05/25: C3 FOLFOX6   05/19/25: C4 FOLFOX6 (scheduled)    Interval History Mamadou MONROE:  Martínez returns to clinic today for a pretreatment appointment. He has persistent cold sensitivity in hands & mouth x1 week post Tx, resolves prior to next cycle. His fatigue is also persistent but he is able to complete his ADLs & walk his dog daily. He did note a mild cough and runny nose x1-2d last week but his has resolved & he is feeling at his baseline today.     Allergies as of 05/19/2025    (No Known Allergies)       Current Outpatient Medications:     losartan-hydrochlorothiazide (HYZAAR) 100-12.5 MG per tablet, Take 1 Tablet by mouth every day., Disp: , Rfl:     ondansetron (ZOFRAN) 4 MG Tab tablet, Take 1 Tablet by mouth " "every four hours as needed for Nausea/Vomiting., Disp: 20 Tablet, Rfl: 3    prochlorperazine (COMPAZINE) 10 MG Tab, Take 1 Tablet by mouth every 6 hours as needed for Nausea/Vomiting., Disp: 30 Tablet, Rfl: 3    acetaminophen (TYLENOL) 325 MG Tab, Take 650 mg by mouth every four hours as needed for Mild Pain., Disp: , Rfl:   Review of Systems:  Review of Systems   Constitutional:  Positive for malaise/fatigue (mild, able to walk his dog few times per day 30 min instead of 90). Negative for chills, diaphoresis, fever and weight loss.   HENT:  Negative for tinnitus.    Eyes:  Negative for blurred vision and double vision.   Respiratory:  Negative for cough and shortness of breath.    Cardiovascular:  Negative for chest pain.   Gastrointestinal:  Positive for constipation (miralax works well). Negative for abdominal pain, blood in stool, diarrhea, heartburn, melena, nausea and vomiting.   Genitourinary:  Negative for dysuria and hematuria.   Musculoskeletal:  Negative for myalgias.   Skin:  Negative for rash.        Dry, peeling skin on hands     Neurological:  Negative for dizziness, tingling, tremors, sensory change, focal weakness and headaches.   Endo/Heme/Allergies:  Does not bruise/bleed easily.     Physical Exam:  Vitals:    05/19/25 1126   BP: (!) 163/84   BP Location: Right arm   Patient Position: Sitting   BP Cuff Size: Adult   Pulse: 64   Resp: 15   SpO2: 97%   Weight: 86.2 kg (190 lb)   Height: 1.803 m (5' 10.98\")     Karnofsky Performance Status: 100  Physical Exam  Constitutional:       Appearance: Normal appearance.   Cardiovascular:      Rate and Rhythm: Normal rate and regular rhythm.      Heart sounds: Normal heart sounds.   Pulmonary:      Effort: Pulmonary effort is normal.      Breath sounds: Wheezing (right mid lung, mild) present.   Abdominal:      General: Abdomen is flat. Bowel sounds are normal.      Palpations: Abdomen is soft.   Skin:     Comments: Mild dry skin/peeling on palms of hands " bilaterally, no signs of infx, erythema or break in skin     Neurological:      Mental Status: He is alert and oriented to person, place, and time.      Sensory: No sensory deficit (sensation diminished throughout palms of both hands).      Motor: No weakness, tremor or atrophy.      Coordination: Coordination normal. Rapid alternating movements normal.   Psychiatric:         Behavior: Behavior normal.       Labs:   Latest Reference Range & Units 05/05/25 11:35   WBC 4.8 - 10.8 K/uL 3.3 (L)   RBC 4.70 - 6.10 M/uL 3.68 (L)   Hemoglobin 14.0 - 18.0 g/dL 11.7 (L)   Hematocrit 42.0 - 52.0 % 33.0 (L)   MCV 81.4 - 97.8 fL 89.7   MCH 27.0 - 33.0 pg 31.8   MCHC 32.3 - 36.5 g/dL 35.5   RDW 35.9 - 50.0 fL 56.2 (H)   Platelet Count 164 - 446 K/uL 145 (L)   MPV 9.0 - 12.9 fL 8.9 (L)   Neutrophils-Polys 44.00 - 72.00 % 64.20   Neutrophils (Absolute) 1.82 - 7.42 K/uL 2.12   Lymphocytes 22.00 - 41.00 % 17.30 (L)   Lymphs (Absolute) 1.00 - 4.80 K/uL 0.57 (L)   Monocytes 0.00 - 13.40 % 16.40 (H)   Monos (Absolute) 0.00 - 0.85 K/uL 0.54   Eosinophils 0.00 - 6.90 % 0.90   Eos (Absolute) 0.00 - 0.51 K/uL 0.03   Basophils 0.00 - 1.80 % 0.90   Baso (Absolute) 0.00 - 0.12 K/uL 0.03   Immature Granulocytes 0.00 - 0.90 % 0.30   Immature Granulocytes (abs) 0.00 - 0.11 K/uL 0.01   Nucleated RBC 0.00 - 0.20 /100 WBC 0.00   NRBC (Absolute) K/uL 0.00   Sodium 135 - 145 mmol/L 139   Potassium 3.6 - 5.5 mmol/L 3.9   Chloride 96 - 112 mmol/L 105   Co2 20 - 33 mmol/L 26   Anion Gap 7.0 - 16.0  8.0   Glucose 65 - 99 mg/dL 96   Bun 8 - 22 mg/dL 16   Creatinine 0.50 - 1.40 mg/dL 0.98   GFR (CKD-EPI) >60 mL/min/1.73 m 2 88   Calcium 8.5 - 10.5 mg/dL 9.2   Correct Calcium 8.5 - 10.5 mg/dL 9.3   AST(SGOT) 12 - 45 U/L 23   ALT(SGPT) 2 - 50 U/L 21   Alkaline Phosphatase 30 - 99 U/L 77   Total Bilirubin 0.1 - 1.5 mg/dL 0.5   Albumin 3.2 - 4.9 g/dL 3.9   Total Protein 6.0 - 8.2 g/dL 6.4   Globulin 1.9 - 3.5 g/dL 2.5   A-G Ratio g/dL 1.6     Imaging: last CT  CAP 12/27/24    Assessment & Plan:  1. Rectal cancer (HCC)        2. Encounter for long-term current use of high risk medication        3. Encounter for chemotherapy management        4. Oncology follow-up encounter            Rectal adenocarcinoma, fQ2V3V5 stage IIA disease, pMMR  .Dx Dec 2024, s/p chemoRT, currently on FOLFOX  Stability of condition: stable    Treatment Plan: FOLFOX6  Patient is meeting criteria to proceed with Cycle 4 of 8, based on the clinical and laboratory information available to me at this time.   -repeat labs prior to Tx    Encouraged unscented hand lotion for peeling  Pt will monitor his cold sensitivity closely & notify us if it progresses, pt has baseline neuropathy/diminished sensation in hands  -mild wheeze right lung, had mild cough & congestion x2d last week but resolved & clinically he is feeling well. Continue to monitor  & notify us of any changes    2. Encounter for High Risk Medication Use  Toxicity: Patient is getting antineoplastic therapy and needs monitoring of blood counts, hepatic function, and renal function due to potential for organ dysfunction. Appropriate lab work has been ordered per treatment plan.   - CBC w/ diff, CMP, CEA    Future Imaging: Repeat MRI rectal protocol and CT c/a/p after completion of therapy (ordered by Dr Andrew, Scheduled in Aug)  Return for Follow Up: 2 weeks for prechemo/RN port lab draw, will try to get pt in with Dr Nesbitt for next available prechemo    Any questions and concerns raised by the patient were answered to the best of my ability. Thank you for allowing me to participate in the care for this patient. Please feel free to contact me for any questions or concerns.   Total time spent on chart review, clinic encounter, documentation, coordination of care: 30 minutes.   Please note that this dictation was created using voice recognition software. I have made every reasonable attempt to correct obvious errors, but I expect that there are  errors of grammar and possibly content that I did not discover before finalizing the note.

## 2025-05-18 NOTE — PROGRESS NOTES
"Pharmacy Chemotherapy Calculations    Dx: Rectal adenocarcinoma  Cycle 4  Previous treatment =C3 5/5/25    Protocol: mFOLFOX6  Oxaliplatin 85 g/m2 IV over 2 hours concurrent with  Leucovorin 400 mg/m2 IV over 2 hours followed by  Fluorouracil 400 mg/m2 IV push followed by  Fluorouracil 2400 mg/m2 CIVI over 46 hours   14-day cycle until DP/UT  NCCN Guidelines for Rectal Cancer V.1.2024.  Efrain GILBERT et al. BMC Cancer. 2007:7:91.9  Alan SL, et al. Br J Cancer. 2002:87(4):393-9.  George AP, et al. NICHOLAS. 2017:317(23):1140-3676.    Allergies:  Patient has no known allergies.       BP (!) 145/78   Pulse 72   Temp 36.7 °C (98 °F) (Temporal)   Resp 18   Ht 1.803 m (5' 10.98\")   Wt 86 kg (189 lb 9.5 oz)   SpO2 98%   BMI 26.46 kg/m²  Body surface area is 2.08 meters squared.    Labs 5/19/25:  ANC~ 1760 Plt = 141k   Hgb = 12.1     SCr = 0.99 mg/dL CrCl ~ 96.3 mL/min   AST/ALT/AP = WNL TBili = 0.5        Oxaliplatin 85 mg/m² x 2.08 m² = 176.8 mg   <10% difference, OK to treat with final dose = 180 mg    Leucovorin 400 mg/m² x 2.08  m² =832 mg   <10% difference, OK to treat with final dose = 850 mg    Fluorouracil 400 mg/m² x 2.08 m² = 832 mg   <10% difference, OK to treat with final dose = 850 mg    Fluorouracil 2400 mg/m² x 2.08 m² = 4992 mg  <10% difference, OK to treat with final dose = 5350 mg CIVI over 46 hours at 2.3 mL/hr    Nargis Lozada, PharmD  "

## 2025-05-19 ENCOUNTER — PATIENT OUTREACH (OUTPATIENT)
Dept: ONCOLOGY | Facility: MEDICAL CENTER | Age: 60
End: 2025-05-19

## 2025-05-19 ENCOUNTER — HOSPITAL ENCOUNTER (OUTPATIENT)
Dept: HEMATOLOGY ONCOLOGY | Facility: MEDICAL CENTER | Age: 60
End: 2025-05-19
Attending: PHYSICIAN ASSISTANT
Payer: MEDICARE

## 2025-05-19 ENCOUNTER — HOSPITAL ENCOUNTER (OUTPATIENT)
Facility: MEDICAL CENTER | Age: 60
End: 2025-05-19
Attending: STUDENT IN AN ORGANIZED HEALTH CARE EDUCATION/TRAINING PROGRAM
Payer: MEDICARE

## 2025-05-19 ENCOUNTER — HOSPITAL ENCOUNTER (OUTPATIENT)
Dept: HEMATOLOGY ONCOLOGY | Facility: MEDICAL CENTER | Age: 60
End: 2025-05-19
Attending: STUDENT IN AN ORGANIZED HEALTH CARE EDUCATION/TRAINING PROGRAM
Payer: MEDICARE

## 2025-05-19 ENCOUNTER — OUTPATIENT INFUSION SERVICES (OUTPATIENT)
Dept: ONCOLOGY | Facility: MEDICAL CENTER | Age: 60
End: 2025-05-19
Attending: STUDENT IN AN ORGANIZED HEALTH CARE EDUCATION/TRAINING PROGRAM
Payer: MEDICARE

## 2025-05-19 VITALS
OXYGEN SATURATION: 97 % | SYSTOLIC BLOOD PRESSURE: 163 MMHG | TEMPERATURE: 97 F | BODY MASS INDEX: 26.57 KG/M2 | DIASTOLIC BLOOD PRESSURE: 84 MMHG | RESPIRATION RATE: 15 BRPM | HEART RATE: 64 BPM | WEIGHT: 190.4 LBS

## 2025-05-19 VITALS
RESPIRATION RATE: 15 BRPM | WEIGHT: 190 LBS | HEART RATE: 64 BPM | HEIGHT: 71 IN | OXYGEN SATURATION: 97 % | DIASTOLIC BLOOD PRESSURE: 84 MMHG | SYSTOLIC BLOOD PRESSURE: 163 MMHG | BODY MASS INDEX: 26.6 KG/M2

## 2025-05-19 VITALS
BODY MASS INDEX: 26.54 KG/M2 | HEIGHT: 71 IN | WEIGHT: 189.6 LBS | DIASTOLIC BLOOD PRESSURE: 78 MMHG | OXYGEN SATURATION: 98 % | SYSTOLIC BLOOD PRESSURE: 145 MMHG | RESPIRATION RATE: 18 BRPM | HEART RATE: 72 BPM | TEMPERATURE: 98 F

## 2025-05-19 DIAGNOSIS — Z79.899 ENCOUNTER FOR LONG-TERM CURRENT USE OF HIGH RISK MEDICATION: ICD-10-CM

## 2025-05-19 DIAGNOSIS — C20 RECTAL CANCER (HCC): ICD-10-CM

## 2025-05-19 DIAGNOSIS — C20 RECTAL CANCER (HCC): Primary | ICD-10-CM

## 2025-05-19 DIAGNOSIS — Z09 ONCOLOGY FOLLOW-UP ENCOUNTER: ICD-10-CM

## 2025-05-19 DIAGNOSIS — Z51.11 ENCOUNTER FOR CHEMOTHERAPY MANAGEMENT: ICD-10-CM

## 2025-05-19 LAB
ALBUMIN SERPL BCP-MCNC: 4.1 G/DL (ref 3.2–4.9)
ALBUMIN/GLOB SERPL: 1.5 G/DL
ALP SERPL-CCNC: 85 U/L (ref 30–99)
ALT SERPL-CCNC: 23 U/L (ref 2–50)
ANION GAP SERPL CALC-SCNC: 8 MMOL/L (ref 7–16)
AST SERPL-CCNC: 28 U/L (ref 12–45)
BASOPHILS # BLD AUTO: 1 % (ref 0–1.8)
BASOPHILS # BLD: 0.03 K/UL (ref 0–0.12)
BILIRUB SERPL-MCNC: 0.5 MG/DL (ref 0.1–1.5)
BUN SERPL-MCNC: 19 MG/DL (ref 8–22)
CALCIUM ALBUM COR SERPL-MCNC: 9.4 MG/DL (ref 8.5–10.5)
CALCIUM SERPL-MCNC: 9.5 MG/DL (ref 8.5–10.5)
CHLORIDE SERPL-SCNC: 104 MMOL/L (ref 96–112)
CO2 SERPL-SCNC: 27 MMOL/L (ref 20–33)
CREAT SERPL-MCNC: 0.99 MG/DL (ref 0.5–1.4)
EOSINOPHIL # BLD AUTO: 0.01 K/UL (ref 0–0.51)
EOSINOPHIL NFR BLD: 0.3 % (ref 0–6.9)
ERYTHROCYTE [DISTWIDTH] IN BLOOD BY AUTOMATED COUNT: 53.6 FL (ref 35.9–50)
GFR SERPLBLD CREATININE-BSD FMLA CKD-EPI: 87 ML/MIN/1.73 M 2
GLOBULIN SER CALC-MCNC: 2.7 G/DL (ref 1.9–3.5)
GLUCOSE SERPL-MCNC: 117 MG/DL (ref 65–99)
HCT VFR BLD AUTO: 34.9 % (ref 42–52)
HGB BLD-MCNC: 12.1 G/DL (ref 14–18)
IMM GRANULOCYTES # BLD AUTO: 0.01 K/UL (ref 0–0.11)
IMM GRANULOCYTES NFR BLD AUTO: 0.3 % (ref 0–0.9)
LYMPHOCYTES # BLD AUTO: 0.58 K/UL (ref 1–4.8)
LYMPHOCYTES NFR BLD: 19.5 % (ref 22–41)
MCH RBC QN AUTO: 30.9 PG (ref 27–33)
MCHC RBC AUTO-ENTMCNC: 34.7 G/DL (ref 32.3–36.5)
MCV RBC AUTO: 89.3 FL (ref 81.4–97.8)
MONOCYTES # BLD AUTO: 0.59 K/UL (ref 0–0.85)
MONOCYTES NFR BLD AUTO: 19.8 % (ref 0–13.4)
NEUTROPHILS # BLD AUTO: 1.76 K/UL (ref 1.82–7.42)
NEUTROPHILS NFR BLD: 59.1 % (ref 44–72)
NRBC # BLD AUTO: 0 K/UL
NRBC BLD-RTO: 0 /100 WBC (ref 0–0.2)
PLATELET # BLD AUTO: 141 K/UL (ref 164–446)
PMV BLD AUTO: 8.8 FL (ref 9–12.9)
POTASSIUM SERPL-SCNC: 3.7 MMOL/L (ref 3.6–5.5)
PROT SERPL-MCNC: 6.8 G/DL (ref 6–8.2)
RBC # BLD AUTO: 3.91 M/UL (ref 4.7–6.1)
SODIUM SERPL-SCNC: 139 MMOL/L (ref 135–145)
WBC # BLD AUTO: 3 K/UL (ref 4.8–10.8)

## 2025-05-19 PROCEDURE — A4212 NON CORING NEEDLE OR STYLET: HCPCS

## 2025-05-19 PROCEDURE — 700111 HCHG RX REV CODE 636 W/ 250 OVERRIDE (IP): Mod: JZ | Performed by: NURSE PRACTITIONER

## 2025-05-19 PROCEDURE — 96409 CHEMO IV PUSH SNGL DRUG: CPT

## 2025-05-19 PROCEDURE — 700102 HCHG RX REV CODE 250 W/ 637 OVERRIDE(OP): Performed by: STUDENT IN AN ORGANIZED HEALTH CARE EDUCATION/TRAINING PROGRAM

## 2025-05-19 PROCEDURE — 96367 TX/PROPH/DG ADDL SEQ IV INF: CPT

## 2025-05-19 PROCEDURE — A9270 NON-COVERED ITEM OR SERVICE: HCPCS | Performed by: STUDENT IN AN ORGANIZED HEALTH CARE EDUCATION/TRAINING PROGRAM

## 2025-05-19 PROCEDURE — 82378 CARCINOEMBRYONIC ANTIGEN: CPT

## 2025-05-19 PROCEDURE — 36591 DRAW BLOOD OFF VENOUS DEVICE: CPT

## 2025-05-19 PROCEDURE — 96375 TX/PRO/DX INJ NEW DRUG ADDON: CPT

## 2025-05-19 PROCEDURE — 85025 COMPLETE CBC W/AUTO DIFF WBC: CPT

## 2025-05-19 PROCEDURE — 96415 CHEMO IV INFUSION ADDL HR: CPT

## 2025-05-19 PROCEDURE — 99214 OFFICE O/P EST MOD 30 MIN: CPT | Performed by: PHYSICIAN ASSISTANT

## 2025-05-19 PROCEDURE — 80053 COMPREHEN METABOLIC PANEL: CPT

## 2025-05-19 PROCEDURE — G0498 CHEMO EXTEND IV INFUS W/PUMP: HCPCS

## 2025-05-19 PROCEDURE — 700105 HCHG RX REV CODE 258: Performed by: NURSE PRACTITIONER

## 2025-05-19 PROCEDURE — 99212 OFFICE O/P EST SF 10 MIN: CPT | Performed by: PHYSICIAN ASSISTANT

## 2025-05-19 PROCEDURE — 96413 CHEMO IV INFUSION 1 HR: CPT

## 2025-05-19 RX ORDER — PALONOSETRON 0.05 MG/ML
0.25 INJECTION, SOLUTION INTRAVENOUS ONCE
Status: COMPLETED | OUTPATIENT
Start: 2025-05-19 | End: 2025-05-19

## 2025-05-19 RX ORDER — DIPHENHYDRAMINE HYDROCHLORIDE 50 MG/ML
50 INJECTION, SOLUTION INTRAMUSCULAR; INTRAVENOUS PRN
Status: DISCONTINUED | OUTPATIENT
Start: 2025-05-19 | End: 2025-05-19 | Stop reason: HOSPADM

## 2025-05-19 RX ORDER — DEXAMETHASONE SODIUM PHOSPHATE 4 MG/ML
12 INJECTION, SOLUTION INTRA-ARTICULAR; INTRALESIONAL; INTRAMUSCULAR; INTRAVENOUS; SOFT TISSUE ONCE
Status: COMPLETED | OUTPATIENT
Start: 2025-05-19 | End: 2025-05-19

## 2025-05-19 RX ORDER — EPINEPHRINE 1 MG/ML(1)
0.5 AMPUL (ML) INJECTION PRN
Status: DISCONTINUED | OUTPATIENT
Start: 2025-05-19 | End: 2025-05-19 | Stop reason: HOSPADM

## 2025-05-19 RX ORDER — ACETAMINOPHEN 500 MG
1000 TABLET ORAL ONCE
Status: COMPLETED | OUTPATIENT
Start: 2025-05-19 | End: 2025-05-19

## 2025-05-19 RX ORDER — METHYLPREDNISOLONE SODIUM SUCCINATE 125 MG/2ML
125 INJECTION, POWDER, LYOPHILIZED, FOR SOLUTION INTRAMUSCULAR; INTRAVENOUS PRN
Status: DISCONTINUED | OUTPATIENT
Start: 2025-05-19 | End: 2025-05-19 | Stop reason: HOSPADM

## 2025-05-19 RX ADMIN — PALONOSETRON HYDROCHLORIDE 0.25 MG: 0.25 INJECTION INTRAVENOUS at 13:55

## 2025-05-19 RX ADMIN — FLUOROURACIL 5350 MG: 50 INJECTION, SOLUTION INTRAVENOUS at 17:30

## 2025-05-19 RX ADMIN — FLUOROURACIL 850 MG: 50 INJECTION, SOLUTION INTRAVENOUS at 17:23

## 2025-05-19 RX ADMIN — DEXAMETHASONE SODIUM PHOSPHATE 12 MG: 4 INJECTION INTRA-ARTICULAR; INTRALESIONAL; INTRAMUSCULAR; INTRAVENOUS; SOFT TISSUE at 13:54

## 2025-05-19 RX ADMIN — OXALIPLATIN 180 MG: 5 INJECTION, SOLUTION INTRAVENOUS at 14:54

## 2025-05-19 RX ADMIN — LEUCOVORIN CALCIUM 850 MG: 500 INJECTION, POWDER, LYOPHILIZED, FOR SOLUTION INTRAMUSCULAR; INTRAVENOUS at 14:52

## 2025-05-19 RX ADMIN — FOSAPREPITANT 150 MG: 150 INJECTION, POWDER, LYOPHILIZED, FOR SOLUTION INTRAVENOUS at 14:10

## 2025-05-19 RX ADMIN — ACETAMINOPHEN 1000 MG: 500 TABLET ORAL at 15:09

## 2025-05-19 ASSESSMENT — FIBROSIS 4 INDEX
FIB4 SCORE: 2.48
FIB4 SCORE: 2.48
FIB4 SCORE: 2.14

## 2025-05-19 ASSESSMENT — ENCOUNTER SYMPTOMS
CONSTIPATION: 1
TINGLING: 0

## 2025-05-19 ASSESSMENT — PAIN DESCRIPTION - PAIN TYPE: TYPE: CHRONIC PAIN

## 2025-05-19 NOTE — PROGRESS NOTES
Chemotherapy Verification - SECONDARY RN     C4 D1    Height = 180.3 cm  Weight = 86 kg  BSA = 2.08 m^2       Medication: Oxaliplatin  Dose: 85 mg/m^2  Calculated Dose: 176.8 mg                             (In mg/m2, AUC, mg/kg)     Medication: Fluorouracil IV bolus  Dose: 400 mg/m^2  Calculated Dose: 832 mg                             (In mg/m2, AUC, mg/kg)    Medication: Fluorouracil CADD pump  Dose: 2,400 mg/m^2  Calculated Dose: 4,992 mg infused 46 hours via CADD pump                             (In mg/m2, AUC, mg/kg)    I confirm that this process was performed independently.

## 2025-05-19 NOTE — ADDENDUM NOTE
Encounter addended by: Claudia Kowalski R.N. on: 5/19/2025 10:29 AM   Actions taken: Actions taken from an [employer] Advisory, Flowsheet accepted

## 2025-05-19 NOTE — PROGRESS NOTES
"Pharmacy Chemotherapy Calculation:    Dx: Rectal adenocarcinoma, stage IIA, pMMR          Protocol: [id number]     *Dosing Reference*  OXALIplatin 85 mg/m2 IV over 2 hours on Day 1 infused concurrently with   Leucovorin 400 mg/m2 IV over 2 hours on Day 1   Fluorouracil 400 mg/m2 IV push followed by  Fluorouracil 2400 mg/m2 CIVI over 46-48 hours   Repeat every 14 days x 4-12 cycles or perioperative therapy (neoadjuvant or adjuvant) or until disease progression or unacceptable toxicity (advanced or metastatic)    NCCN Guidelines for Rectal Cancer V.1.2024.  De Yenifer SYLVESTER et al.J Clin Oncol.2000;18(16):2938-47.  Alan SL, et al.Br J Cancer.2002;87(4):393-9.  Licha-Prashanth F, et al. Tiana Oncol.2000;11(11);1477-83.  Abdi T et al. N Engl J Med.2004;350(23):2343-91.    Allergies:  Patient has no known allergies.     BP (!) 145/78   Pulse 72   Temp 36.7 °C (98 °F) (Temporal)   Resp 18   Ht 1.803 m (5' 10.98\")   Wt 86 kg (189 lb 9.5 oz)   SpO2 98%   BMI 26.46 kg/m²  Body surface area is 2.08 meters squared.    Labs 5/19/25:  ANC~ 1760 Plt = 141 k   Hgb = 12.1     SCr = 0.99 mg/dL CrCl ~ 96 mL/min   AST/ALT/AP = WNL TBili = 0.5  K+ = 3.7     Drug Order   (Drug name, dose, route, IV Fluid & volume, frequency, number of doses) Cycle 4, Day 1      Previous treatment:C3D1 5/5/25; s/p capecitabine + XRT     Medication = OXALIplatin  Base Dose = 85 mg/m2  Calc Dose: Base Dose x 2.08 m² = 176.8 mg  Final Dose = 180 mg  Route = IV  Fluid & Volume = D5W 250 mL  Admin Duration = Over 2 hours     Infuse concurrently with LCV      <10% difference, okay to treat with final dose     Medication = Leucovorin  Base Dose = 400 mg/m2   Calc Dose: Base Dose x 2.08 m² = 832 mg  Final Dose = 850 mg  Route = IV  Fluid & Volume = D5W 250 mL  Admin Duration = Over 2 hours      Infuse concurrently with OXALIplatin      <10% difference, okay to treat with final dose     Medication = Fluorouracil  Base Dose = 400 mg/m2  Calc Dose:Base Dose " x 2.08 m² = 832 mg  Final Dose = 850 mg  Route = IV  Fluid & Volume = 50 mg/mL; 17 mL  Admin Duration = Over 5 min           <10% difference, okay to treat with final dose     Medication = Fluorouracil  Base Dose = 2400 mg/m2  Calc Dose: Base Dose x 2.08 m² = 4992 mg  Final Dose = 5350 mg  Route = CIVI via CADD pump  Fluid & Volume = 50mg/mL; 107 mL +3 mL overfill   Admin Duration = Over 46 hrs @ 2.3  ml/min          <10% difference, okay to treat with final dose       By my signature below, I confirm this process was performed independently with the BSA and all final chemotherapy dosing calculations congruent. I have reviewed the above chemotherapy order and that my calculation of the final dose and BSA (when applicable) corroborate those calculations of the  pharmacist. Discrepancies of 10% or greater in the written dose have been addressed and documented within the EPIC Progress notes.    Sabra Puentes, PharmD, BCOP

## 2025-05-19 NOTE — PROGRESS NOTES
Chemotherapy Verification - PRIMARY RN    Day 1 Cycle 4    Height = 180.3 cm  Weight = 86 kg  BSA = 2.08 m2       Medication: Eloxatin  Dose: 85 mg/m2  Calculated Dose: 176.8 mg                             (In mg/m2, AUC, mg/kg)     Medication: Fluorouracil Dose: 400 mg/m2  Calculated Dose: 832 mg (bolus)                            (In mg/m2, AUC, mg/kg)    Medication: Fluorouracil   Dose: 2400 mg/m2  Calculated Dose: 4992 mg  (over 46 hrs through CADD pump)                            (In mg/m2, AUC, mg/kg)      I confirm this process was performed independently with the BSA and all final chemotherapy dosing calculations congruent.  Any discrepancies of 10% or greater have been addressed with the chemotherapy pharmacist. The resolution of the discrepancy has been documented in the EPIC progress notes.

## 2025-05-19 NOTE — PROGRESS NOTES
On May 19, 2025, , Mary Ball, met with pt. in the infusion center to check in on pt.s well being. Pt. reported that he was doing “okay”. Pt is on his 4th cycle of chemotherapy and pt. reported that he has been feeling worn down. Pt. came up to [place] with his partner Tabatha and stated that they are currently staying in a hotel with their service dog. Pt. reported that his father recently passed and he is helping settle his bills at this time and AMY Ball offered condolences for the passing of pt.'s father. AMY Ball inquired about how pt. was doing with gas, as he is coming from [place] and if pt. needed another gas card. Pt. stated that he is doing okay right now as he still has money left from his fathers estate. AMY Ball provided emotional support to patient and pt. confirmed that he still has social work and nurse navigation's contact information. AMY Ball encouraged pt. to reach out with any concerns or needs.

## 2025-05-19 NOTE — PROGRESS NOTES
Pt arrives to medical oncology for port flush with labs. R chest port accessed in sterile manner. Brisk blood return obtained. Labs collected: CBC, CMP, CEA.  Port flushed per [facility] policy and left accessed per Dr. Nesbitt for today's scheduled infusion. Pt released ambulatory from clinic in no apparent distress.

## 2025-05-20 NOTE — PROGRESS NOTES
Pt arrives to IS for cycle 4 of FOLFOX  Pt denies s/s of infection.  R port was accessed at lab appointment brisk blood return was observed. Labs reviewed and results meet parameters for treatment.  Pre-medications given.  Oxaliplatin infused without adverse reaction. 5FU IV bolus given. Port flushed with NS and blood return observed prior to connecting 5FU CADD pump.  5FU CADD pump settings verified by 2 RN's.  CADD pump connected to pt and pump turned on to RUN mode.  Confirmed next appt time on 5/21/25 with pt.  Pt dc home to self care.

## 2025-05-21 ENCOUNTER — OUTPATIENT INFUSION SERVICES (OUTPATIENT)
Dept: ONCOLOGY | Facility: MEDICAL CENTER | Age: 60
End: 2025-05-21
Attending: STUDENT IN AN ORGANIZED HEALTH CARE EDUCATION/TRAINING PROGRAM
Payer: MEDICARE

## 2025-05-21 VITALS
RESPIRATION RATE: 18 BRPM | SYSTOLIC BLOOD PRESSURE: 123 MMHG | TEMPERATURE: 97 F | OXYGEN SATURATION: 99 % | DIASTOLIC BLOOD PRESSURE: 64 MMHG | HEART RATE: 66 BPM

## 2025-05-21 DIAGNOSIS — C20 RECTAL CANCER (HCC): Primary | ICD-10-CM

## 2025-05-21 LAB — TEST NAME 95000: NORMAL

## 2025-05-21 PROCEDURE — 96523 IRRIG DRUG DELIVERY DEVICE: CPT

## 2025-05-21 NOTE — PROGRESS NOTES
Pt arrived to IS, ambulatory, for CADD pump disconnect. Pt voices no new complaints. Pump stopped upon arrival, 0mL left to be delivered. Pump disconnected and returned to pharmacy. Port flushed per policy, port de-accessed. Pt left IS with no s/sx of distress. Follow up appointment confirmed.

## 2025-05-29 RX ORDER — PALONOSETRON 0.05 MG/ML
0.25 INJECTION, SOLUTION INTRAVENOUS ONCE
OUTPATIENT
Start: 2025-06-02 | End: 2025-06-02

## 2025-05-29 RX ORDER — DIPHENHYDRAMINE HYDROCHLORIDE 50 MG/ML
50 INJECTION, SOLUTION INTRAMUSCULAR; INTRAVENOUS PRN
OUTPATIENT
Start: 2025-06-02

## 2025-05-29 RX ORDER — 0.9 % SODIUM CHLORIDE 0.9 %
10 VIAL (ML) INJECTION PRN
OUTPATIENT
Start: 2025-06-02

## 2025-05-29 RX ORDER — DEXTROSE MONOHYDRATE 50 MG/ML
INJECTION, SOLUTION INTRAVENOUS CONTINUOUS
OUTPATIENT
Start: 2025-06-02

## 2025-05-29 RX ORDER — 0.9 % SODIUM CHLORIDE 0.9 %
20 VIAL (ML) INJECTION PRN
OUTPATIENT
Start: 2025-06-04

## 2025-05-29 RX ORDER — 0.9 % SODIUM CHLORIDE 0.9 %
10 VIAL (ML) INJECTION PRN
OUTPATIENT
Start: 2025-06-01

## 2025-05-29 RX ORDER — 0.9 % SODIUM CHLORIDE 0.9 %
3 VIAL (ML) INJECTION PRN
OUTPATIENT
Start: 2025-06-01

## 2025-05-29 RX ORDER — ONDANSETRON 2 MG/ML
4 INJECTION INTRAMUSCULAR; INTRAVENOUS PRN
OUTPATIENT
Start: 2025-06-02

## 2025-05-29 RX ORDER — 0.9 % SODIUM CHLORIDE 0.9 %
3 VIAL (ML) INJECTION PRN
OUTPATIENT
Start: 2025-06-02

## 2025-05-29 RX ORDER — PROCHLORPERAZINE MALEATE 10 MG
10 TABLET ORAL EVERY 6 HOURS PRN
OUTPATIENT
Start: 2025-06-02

## 2025-05-29 RX ORDER — METHYLPREDNISOLONE SODIUM SUCCINATE 125 MG/2ML
125 INJECTION, POWDER, LYOPHILIZED, FOR SOLUTION INTRAMUSCULAR; INTRAVENOUS PRN
OUTPATIENT
Start: 2025-06-02

## 2025-05-29 RX ORDER — 0.9 % SODIUM CHLORIDE 0.9 %
VIAL (ML) INJECTION PRN
OUTPATIENT
Start: 2025-06-01

## 2025-05-29 RX ORDER — EPINEPHRINE 1 MG/ML(1)
0.5 AMPUL (ML) INJECTION PRN
OUTPATIENT
Start: 2025-06-02

## 2025-05-29 RX ORDER — DEXAMETHASONE SODIUM PHOSPHATE 4 MG/ML
12 INJECTION, SOLUTION INTRA-ARTICULAR; INTRALESIONAL; INTRAMUSCULAR; INTRAVENOUS; SOFT TISSUE ONCE
OUTPATIENT
Start: 2025-06-02 | End: 2025-06-02

## 2025-05-29 RX ORDER — 0.9 % SODIUM CHLORIDE 0.9 %
VIAL (ML) INJECTION PRN
OUTPATIENT
Start: 2025-06-02

## 2025-05-29 RX ORDER — ONDANSETRON 8 MG/1
8 TABLET, ORALLY DISINTEGRATING ORAL PRN
OUTPATIENT
Start: 2025-06-02

## 2025-05-30 ENCOUNTER — HOSPITAL ENCOUNTER (OUTPATIENT)
Dept: HEMATOLOGY ONCOLOGY | Facility: MEDICAL CENTER | Age: 60
End: 2025-05-30
Attending: NURSE PRACTITIONER
Payer: MEDICARE

## 2025-05-30 VITALS
BODY MASS INDEX: 26.09 KG/M2 | TEMPERATURE: 97.3 F | WEIGHT: 187 LBS | OXYGEN SATURATION: 97 % | HEART RATE: 76 BPM | DIASTOLIC BLOOD PRESSURE: 95 MMHG | SYSTOLIC BLOOD PRESSURE: 157 MMHG

## 2025-05-30 DIAGNOSIS — C20 RECTAL CANCER (HCC): ICD-10-CM

## 2025-05-30 DIAGNOSIS — Z79.899 ENCOUNTER FOR LONG-TERM CURRENT USE OF HIGH RISK MEDICATION: ICD-10-CM

## 2025-05-30 PROCEDURE — 99214 OFFICE O/P EST MOD 30 MIN: CPT | Mod: 95 | Performed by: NURSE PRACTITIONER

## 2025-05-30 ASSESSMENT — ENCOUNTER SYMPTOMS
VOMITING: 0
MYALGIAS: 0
SENSORY CHANGE: 1
TINGLING: 0
PALPITATIONS: 0
SHORTNESS OF BREATH: 0
HEADACHES: 0
COUGH: 0
CONSTIPATION: 1
FEVER: 0
CHILLS: 0
DIARRHEA: 0
NAUSEA: 0
WHEEZING: 0
INSOMNIA: 1
DIZZINESS: 0
WEIGHT LOSS: 0

## 2025-05-30 ASSESSMENT — FIBROSIS 4 INDEX: FIB4 SCORE: 2.48

## 2025-05-30 NOTE — PROGRESS NOTES
Subjective     Martínez Lyons Jr. is a 60 y.o. male who presents with Rectal Cancer ([facility]/pre chemo)            HPI    C5  Video      Review of Systems   Constitutional:  Negative for chills, fever, malaise/fatigue (usual activity, tires easy) and weight loss (stable (187#)).   Respiratory:  Negative for cough, shortness of breath and wheezing.    Cardiovascular:  Negative for chest pain, palpitations and leg swelling.        Ran out of losartan, picking up today   Gastrointestinal:  Positive for constipation (after treatment for a couple days, miralax day priro helps). Negative for diarrhea, nausea (hasn't taken any meds) and vomiting.   Genitourinary:  Negative for dysuria.   Musculoskeletal:  Negative for joint pain and myalgias.   Neurological:  Positive for sensory change (cold sensistivity, tapers by next cycle). Negative for dizziness, tingling and headaches.   Psychiatric/Behavioral:  The patient has insomnia. Nervous/anxious: consistent with baseline.              Objective     BP (!) 157/95 Comment: patient took at home  Pulse 76   Temp 36.3 °C (97.3 °F) Comment: per pt  Wt 84.8 kg (187 lb) Comment: per pt  SpO2 97% Comment: room air  BMI 26.09 kg/m²      Physical Exam             Assessment & Plan     No diagnosis found.        2 weeks           and vomiting.   Genitourinary:  Negative for dysuria.   Musculoskeletal:  Negative for joint pain and myalgias.   Neurological:  Positive for sensory change (cold sensitivity, tapers by next cycle). Negative for dizziness, tingling and headaches.   Psychiatric/Behavioral:  The patient has insomnia. Nervous/anxious: consistent with baseline.     Allergies[1]      Medications Ordered Prior to Encounter[2]           Objective     BP (!) 157/95 Comment: patient took at home  Pulse 76   Temp 36.3 °C (97.3 °F) Comment: per pt  Wt 84.8 kg (187 lb) Comment: per pt  SpO2 97% Comment: room air  BMI 26.09 kg/m²      Physical Exam  Constitutional:       General: He is not in acute distress.     Appearance: Normal appearance. He is not ill-appearing.   Pulmonary:      Effort: Pulmonary effort is normal.   Skin:     Coloration: Skin is not jaundiced or pale.   Neurological:      Mental Status: He is alert and oriented to person, place, and time. Mental status is at baseline.   Psychiatric:         Mood and Affect: Mood normal.       Treatment labs to be completed prior to dosing      No visits with results within 1 Day(s) from this visit.   Latest known visit with results is:   Hospital Outpatient Visit on 05/19/2025   Component Date Value Ref Range Status    Sodium 05/19/2025 139  135 - 145 mmol/L Final    Potassium 05/19/2025 3.7  3.6 - 5.5 mmol/L Final    Chloride 05/19/2025 104  96 - 112 mmol/L Final    Co2 05/19/2025 27  20 - 33 mmol/L Final    Anion Gap 05/19/2025 8.0  7.0 - 16.0 Final    Glucose 05/19/2025 117 (H)  65 - 99 mg/dL Final    Bun 05/19/2025 19  8 - 22 mg/dL Final    Creatinine 05/19/2025 0.99  0.50 - 1.40 mg/dL Final    Calcium 05/19/2025 9.5  8.5 - 10.5 mg/dL Final    Correct Calcium 05/19/2025 9.4  8.5 - 10.5 mg/dL Final    AST(SGOT) 05/19/2025 28  12 - 45 U/L Final    ALT(SGPT) 05/19/2025 23  2 - 50 U/L Final    Alkaline Phosphatase 05/19/2025 85  30 - 99 U/L Final    Total Bilirubin 05/19/2025 0.5  0.1  - 1.5 mg/dL Final    Albumin 05/19/2025 4.1  3.2 - 4.9 g/dL Final    Total Protein 05/19/2025 6.8  6.0 - 8.2 g/dL Final    Globulin 05/19/2025 2.7  1.9 - 3.5 g/dL Final    A-G Ratio 05/19/2025 1.5  g/dL Final    WBC 05/19/2025 3.0 (L)  4.8 - 10.8 K/uL Final    RBC 05/19/2025 3.91 (L)  4.70 - 6.10 M/uL Final    Hemoglobin 05/19/2025 12.1 (L)  14.0 - 18.0 g/dL Final    Hematocrit 05/19/2025 34.9 (L)  42.0 - 52.0 % Final    MCV 05/19/2025 89.3  81.4 - 97.8 fL Final    MCH 05/19/2025 30.9  27.0 - 33.0 pg Final    MCHC 05/19/2025 34.7  32.3 - 36.5 g/dL Final    RDW 05/19/2025 53.6 (H)  35.9 - 50.0 fL Final    Platelet Count 05/19/2025 141 (L)  164 - 446 K/uL Final    MPV 05/19/2025 8.8 (L)  9.0 - 12.9 fL Final    Neutrophils-Polys 05/19/2025 59.10  44.00 - 72.00 % Final    Lymphocytes 05/19/2025 19.50 (L)  22.00 - 41.00 % Final    Monocytes 05/19/2025 19.80 (H)  0.00 - 13.40 % Final    Eosinophils 05/19/2025 0.30  0.00 - 6.90 % Final    Basophils 05/19/2025 1.00  0.00 - 1.80 % Final    Immature Granulocytes 05/19/2025 0.30  0.00 - 0.90 % Final    Nucleated RBC 05/19/2025 0.00  0.00 - 0.20 /100 WBC Final    Neutrophils (Absolute) 05/19/2025 1.76 (L)  1.82 - 7.42 K/uL Final    Includes immature neutrophils, if present.    Lymphs (Absolute) 05/19/2025 0.58 (L)  1.00 - 4.80 K/uL Final    Monos (Absolute) 05/19/2025 0.59  0.00 - 0.85 K/uL Final    Eos (Absolute) 05/19/2025 0.01  0.00 - 0.51 K/uL Final    Baso (Absolute) 05/19/2025 0.03  0.00 - 0.12 K/uL Final    Immature Granulocytes (abs) 05/19/2025 0.01  0.00 - 0.11 K/uL Final    NRBC (Absolute) 05/19/2025 0.00  K/uL Final    GFR (CKD-EPI) 05/19/2025 87  >60 mL/min/1.73 m 2 Final    Comment: Estimated Glomerular Filtration Rate is calculated using  race neutral CKD-EPI 2021 equation per NKF-ASN recommendations.      Miscellaneous Lab Result 05/19/2025 SEE NOTE   Final    Comment: Test name                     Result Flag Units   RefIntvl  -------------------------------------------------------------  Carcinoembryonic Antigen         2.4       ng/mL <=3.8  INTERPRETIVE INFORMATION: Carcinoembryonic Antigen  The Roche CEA electrochemiluminescent immunoassay was used.  Results obtained with different test methods or kits cannot be  used interchangeably. Measurement of CEA has been shown to be  clinically relevant in the management of patients with colorectal,  breast, lung, prostatic, pancreatic, and ovarian carcinomas.  Smokers may have slightly elevated levels of CEA. The CEA assay  value, regardless of level, should not be interpreted as evidence  for the presence or absence of malignant disease and is not  recommended for use as a screening procedure to detect the  presence of cancer in the general population.  Performed By: [facility]  [address] [place] [id number]  : Timur Sullivan MD, PhD  CLIA Number: [id number]              Assessment & Plan     1. Rectal cancer (HCC)        2. Encounter for long-term current use of high risk medication          1.  Rectal cancer: Diagnosed 12/31/24; s/p concurrent chemoXRT w/ [place] 2/10-3/24/25; initiated FOLFOX on 4/7/25     Patient is tolerating treatment without issue.  CBC, CMP, CEA will be completed prior to dosing and if within acceptable limits patient will proceed with cycle 5 of treatment and return in 2 weeks for evaluation prior to cycle 6, sooner as needed.         This evaluation was conducted via Teams using secure and encrypted videoconferencing technology. The patient was in their home in the [facility]. The patient's identity was confirmed and verbal consent was obtained for this virtual visit.  The patient verbalized agreement and understanding of current plan. All questions and concerns were addressed at time of visit.    Please note that this dictation was created using voice recognition software. I have made every reasonable attempt  to correct obvious errors, but I expect that there are errors of grammar and possibly content that I did not discover before finalizing the note.            [1] No Known Allergies  [2]   Current Outpatient Medications on File Prior to Encounter   Medication Sig Dispense Refill    losartan-hydrochlorothiazide (HYZAAR) 100-12.5 MG per tablet Take 1 Tablet by mouth every day.      ondansetron (ZOFRAN) 4 MG Tab tablet Take 1 Tablet by mouth every four hours as needed for Nausea/Vomiting. 20 Tablet 3    prochlorperazine (COMPAZINE) 10 MG Tab Take 1 Tablet by mouth every 6 hours as needed for Nausea/Vomiting. 30 Tablet 3    acetaminophen (TYLENOL) 325 MG Tab Take 650 mg by mouth every four hours as needed for Mild Pain.       No current facility-administered medications on file prior to encounter.

## 2025-06-02 ENCOUNTER — HOSPITAL ENCOUNTER (OUTPATIENT)
Dept: HEMATOLOGY ONCOLOGY | Facility: MEDICAL CENTER | Age: 60
End: 2025-06-02
Attending: STUDENT IN AN ORGANIZED HEALTH CARE EDUCATION/TRAINING PROGRAM
Payer: MEDICARE

## 2025-06-02 ENCOUNTER — OUTPATIENT INFUSION SERVICES (OUTPATIENT)
Dept: ONCOLOGY | Facility: MEDICAL CENTER | Age: 60
End: 2025-06-02
Attending: STUDENT IN AN ORGANIZED HEALTH CARE EDUCATION/TRAINING PROGRAM
Payer: MEDICARE

## 2025-06-02 ENCOUNTER — HOSPITAL ENCOUNTER (OUTPATIENT)
Facility: MEDICAL CENTER | Age: 60
End: 2025-06-02
Attending: STUDENT IN AN ORGANIZED HEALTH CARE EDUCATION/TRAINING PROGRAM
Payer: MEDICARE

## 2025-06-02 VITALS
OXYGEN SATURATION: 96 % | TEMPERATURE: 96.8 F | SYSTOLIC BLOOD PRESSURE: 139 MMHG | DIASTOLIC BLOOD PRESSURE: 81 MMHG | HEART RATE: 76 BPM

## 2025-06-02 VITALS
SYSTOLIC BLOOD PRESSURE: 151 MMHG | WEIGHT: 187.39 LBS | BODY MASS INDEX: 26.23 KG/M2 | HEIGHT: 71 IN | DIASTOLIC BLOOD PRESSURE: 78 MMHG | HEART RATE: 76 BPM | OXYGEN SATURATION: 98 % | RESPIRATION RATE: 18 BRPM | TEMPERATURE: 97.4 F

## 2025-06-02 DIAGNOSIS — C20 RECTAL CANCER (HCC): Primary | ICD-10-CM

## 2025-06-02 DIAGNOSIS — Z79.899 ENCOUNTER FOR LONG-TERM CURRENT USE OF HIGH RISK MEDICATION: ICD-10-CM

## 2025-06-02 DIAGNOSIS — C20 RECTAL CANCER (HCC): ICD-10-CM

## 2025-06-02 LAB
ALBUMIN SERPL BCP-MCNC: 4 G/DL (ref 3.2–4.9)
ALBUMIN/GLOB SERPL: 1.7 G/DL
ALP SERPL-CCNC: 81 U/L (ref 30–99)
ALT SERPL-CCNC: 28 U/L (ref 2–50)
ANION GAP SERPL CALC-SCNC: 13 MMOL/L (ref 7–16)
ANISOCYTOSIS BLD QL SMEAR: ABNORMAL
AST SERPL-CCNC: 31 U/L (ref 12–45)
BASOPHILS # BLD AUTO: 2.6 % (ref 0–1.8)
BASOPHILS # BLD: 0.06 K/UL (ref 0–0.12)
BILIRUB SERPL-MCNC: 0.5 MG/DL (ref 0.1–1.5)
BUN SERPL-MCNC: 15 MG/DL (ref 8–22)
CALCIUM ALBUM COR SERPL-MCNC: 9.1 MG/DL (ref 8.5–10.5)
CALCIUM SERPL-MCNC: 9.1 MG/DL (ref 8.5–10.5)
CEA SERPL-MCNC: 2.4 NG/ML (ref 0–3)
CHLORIDE SERPL-SCNC: 107 MMOL/L (ref 96–112)
CO2 SERPL-SCNC: 23 MMOL/L (ref 20–33)
CREAT SERPL-MCNC: 1.17 MG/DL (ref 0.5–1.4)
EOSINOPHIL # BLD AUTO: 0.02 K/UL (ref 0–0.51)
EOSINOPHIL NFR BLD: 0.8 % (ref 0–6.9)
ERYTHROCYTE [DISTWIDTH] IN BLOOD BY AUTOMATED COUNT: 54.2 FL (ref 35.9–50)
GFR SERPLBLD CREATININE-BSD FMLA CKD-EPI: 71 ML/MIN/1.73 M 2
GLOBULIN SER CALC-MCNC: 2.4 G/DL (ref 1.9–3.5)
GLUCOSE SERPL-MCNC: 111 MG/DL (ref 65–99)
HCT VFR BLD AUTO: 33.3 % (ref 42–52)
HGB BLD-MCNC: 11.6 G/DL (ref 14–18)
LYMPHOCYTES # BLD AUTO: 0.46 K/UL (ref 1–4.8)
LYMPHOCYTES NFR BLD: 20 % (ref 22–41)
MANUAL DIFF BLD: NORMAL
MCH RBC QN AUTO: 31.5 PG (ref 27–33)
MCHC RBC AUTO-ENTMCNC: 34.8 G/DL (ref 32.3–36.5)
MCV RBC AUTO: 90.5 FL (ref 81.4–97.8)
MICROCYTES BLD QL SMEAR: ABNORMAL
MONOCYTES # BLD AUTO: 0.3 K/UL (ref 0–0.85)
MONOCYTES NFR BLD AUTO: 12.2 % (ref 0–13.4)
MORPHOLOGY BLD-IMP: NORMAL
NEUTROPHILS # BLD AUTO: 1.48 K/UL (ref 1.82–7.42)
NEUTROPHILS NFR BLD: 63.5 % (ref 44–72)
NEUTS BAND NFR BLD MANUAL: 0.9 % (ref 0–10)
NRBC # BLD AUTO: 0 K/UL
NRBC BLD-RTO: 0 /100 WBC (ref 0–0.2)
OVALOCYTES BLD QL SMEAR: NORMAL
PLATELET # BLD AUTO: 135 K/UL (ref 164–446)
PLATELET BLD QL SMEAR: NORMAL
PMV BLD AUTO: 9 FL (ref 9–12.9)
POIKILOCYTOSIS BLD QL SMEAR: NORMAL
POTASSIUM SERPL-SCNC: 3.7 MMOL/L (ref 3.6–5.5)
PROT SERPL-MCNC: 6.4 G/DL (ref 6–8.2)
RBC # BLD AUTO: 3.68 M/UL (ref 4.7–6.1)
RBC BLD AUTO: PRESENT
SODIUM SERPL-SCNC: 143 MMOL/L (ref 135–145)
WBC # BLD AUTO: 2.3 K/UL (ref 4.8–10.8)

## 2025-06-02 PROCEDURE — 700111 HCHG RX REV CODE 636 W/ 250 OVERRIDE (IP): Mod: JZ | Performed by: NURSE PRACTITIONER

## 2025-06-02 PROCEDURE — 82378 CARCINOEMBRYONIC ANTIGEN: CPT

## 2025-06-02 PROCEDURE — G0498 CHEMO EXTEND IV INFUS W/PUMP: HCPCS

## 2025-06-02 PROCEDURE — 700105 HCHG RX REV CODE 258: Performed by: NURSE PRACTITIONER

## 2025-06-02 PROCEDURE — 96415 CHEMO IV INFUSION ADDL HR: CPT

## 2025-06-02 PROCEDURE — 96411 CHEMO IV PUSH ADDL DRUG: CPT

## 2025-06-02 PROCEDURE — 96367 TX/PROPH/DG ADDL SEQ IV INF: CPT

## 2025-06-02 PROCEDURE — 85007 BL SMEAR W/DIFF WBC COUNT: CPT

## 2025-06-02 PROCEDURE — 80053 COMPREHEN METABOLIC PANEL: CPT

## 2025-06-02 PROCEDURE — 96413 CHEMO IV INFUSION 1 HR: CPT

## 2025-06-02 PROCEDURE — 96375 TX/PRO/DX INJ NEW DRUG ADDON: CPT

## 2025-06-02 PROCEDURE — A4212 NON CORING NEEDLE OR STYLET: HCPCS

## 2025-06-02 PROCEDURE — 36591 DRAW BLOOD OFF VENOUS DEVICE: CPT

## 2025-06-02 PROCEDURE — 85027 COMPLETE CBC AUTOMATED: CPT

## 2025-06-02 RX ORDER — PALONOSETRON 0.05 MG/ML
0.25 INJECTION, SOLUTION INTRAVENOUS ONCE
Status: COMPLETED | OUTPATIENT
Start: 2025-06-02 | End: 2025-06-02

## 2025-06-02 RX ADMIN — FOSAPREPITANT 150 MG: 150 INJECTION, POWDER, LYOPHILIZED, FOR SOLUTION INTRAVENOUS at 14:00

## 2025-06-02 RX ADMIN — FLUOROURACIL 5350 MG: 50 INJECTION, SOLUTION INTRAVENOUS at 17:03

## 2025-06-02 RX ADMIN — PALONOSETRON HYDROCHLORIDE 0.25 MG: 0.25 INJECTION INTRAVENOUS at 13:47

## 2025-06-02 RX ADMIN — OXALIPLATIN 180 MG: 5 INJECTION, SOLUTION INTRAVENOUS at 14:39

## 2025-06-02 RX ADMIN — FLUOROURACIL 850 MG: 50 INJECTION, SOLUTION INTRAVENOUS at 16:58

## 2025-06-02 RX ADMIN — DEXAMETHASONE SODIUM PHOSPHATE 12 MG: 4 INJECTION, SOLUTION INTRA-ARTICULAR; INTRALESIONAL; INTRAMUSCULAR; INTRAVENOUS; SOFT TISSUE at 13:49

## 2025-06-02 RX ADMIN — LEUCOVORIN CALCIUM 850 MG: 500 INJECTION, POWDER, LYOPHILIZED, FOR SOLUTION INTRAMUSCULAR; INTRAVENOUS at 14:39

## 2025-06-02 ASSESSMENT — ENCOUNTER SYMPTOMS
CONSTIPATION: 1
SENSORY CHANGE: 1

## 2025-06-02 ASSESSMENT — FIBROSIS 4 INDEX: FIB4 SCORE: 2.6

## 2025-06-02 NOTE — PROGRESS NOTES
"Pharmacy Chemotherapy Calculation:    Dx: Rectal adenocarcinoma, stage IIA, pMMR          Protocol: mFOLFOX6     *Dosing Reference*  OXALIplatin 85 mg/m2 IV over 2 hours on Day 1 infused concurrently with   Leucovorin 400 mg/m2 IV over 2 hours on Day 1   Fluorouracil 400 mg/m2 IV push followed by  Fluorouracil 2400 mg/m2 CIVI over 46-48 hours   Repeat every 14 days x 4-12 cycles or perioperative therapy (neoadjuvant or adjuvant) or until disease progression or unacceptable toxicity (advanced or metastatic)    NCCN Guidelines for Rectal Cancer V.1.2024.  De Yenifer SYLVESTER et al.J Clin Oncol.2000;18(16):2938-47.  Alan SL, et al.Br J Cancer.2002;87(4):393-9.  Licha-Prashanth F, et al. Tiana Oncol.2000;11(11);1477-83.  Abdi T et al. N Engl J Med.2004;350(23):2343-03.    Allergies:  Patient has no known allergies.     BP (!) 151/78   Pulse 76   Temp 36.3 °C (97.4 °F) (Temporal)   Resp 18   Ht 1.803 m (5' 10.98\")   Wt 85 kg (187 lb 6.3 oz)   SpO2 98%   BMI 26.15 kg/m²  Body surface area is 2.06 meters squared.    Labs 6/2/25:  ANC~ 1480 Plt = 135k   Hgb = 11.6     SCr = 1.17 mg/dL CrCl ~ 81 mL/min   AST/ALT/AP = WNL TBili = 0.5   K+ = 3.7     Drug Order   (Drug name, dose, route, IV Fluid & volume, frequency, number of doses) Cycle 5, Day 1      Previous treatment:C4D1 5/19/25; s/p capecitabine + XRT     Medication = OXALIplatin  Base Dose = 85 mg/m2  Calc Dose: Base Dose x 2.06 m² = 175.1 mg  Final Dose = 180 mg  Route = IV  Fluid & Volume = D5W 250 mL  Admin Duration = Over 2 hours     Infuse concurrently with LCV      <10% difference, okay to treat with final dose     Medication = Leucovorin  Base Dose = 400 mg/m2   Calc Dose: Base Dose x 2.06 m² = 824 mg  Final Dose = 850 mg  Route = IV  Fluid & Volume = D5W 250 mL  Admin Duration = Over 2 hours      Infuse concurrently with OXALIplatin      <10% difference, okay to treat with final dose     Medication = Fluorouracil  Base Dose = 400 mg/m2  Calc Dose:Base " Dose x 2.06 m² = 824 mg  Final Dose = 850 mg  Route = IV  Fluid & Volume = 50 mg/mL; 17 mL  Admin Duration = Over 5 min           <10% difference, okay to treat with final dose     Medication = Fluorouracil  Base Dose = 2400 mg/m2  Calc Dose: Base Dose x 2.06 m² = 4944 mg  Final Dose = 5350 mg  Route = CIVI via CADD pump  Fluid & Volume = 50mg/mL; 107 mL +3 mL overfill   Admin Duration = Over 46 hrs @ 2.3  ml/min          <10% difference, okay to treat with final dose       By my signature below, I confirm this process was performed independently with the BSA and all final chemotherapy dosing calculations congruent. I have reviewed the above chemotherapy order and that my calculation of the final dose and BSA (when applicable) corroborate those calculations of the  pharmacist. Discrepancies of 10% or greater in the written dose have been addressed and documented within the EPIC Progress notes.    Sabra Puentes, PharmD, BCOP

## 2025-06-02 NOTE — PROGRESS NOTES
Chemotherapy Verification - PRIMARY RN      Height = 1.803 m  Weight = 85 kg  BSA = 2.06 m2       Medication: Oxaliplatin  Dose: 85 mg/m2  Calculated Dose: 175.1 mg                             (In mg/m2, AUC, mg/kg)     Medication: Adrucil bolus injection  Dose: 400 mg/m2  Calculated Dose: 824 mg                             (In mg/m2, AUC, mg/kg)    Medication: Adrucil CADD pump  Dose: 2400 mg/m2  Calculated Dose: 4944                             (In mg/m2, AUC, mg/kg)    I confirm this process was performed independently with the BSA and all final chemotherapy dosing calculations congruent.  Any discrepancies of 10% or greater have been addressed with the chemotherapy pharmacist. The resolution of the discrepancy has been documented in the EPIC progress notes.

## 2025-06-02 NOTE — ADDENDUM NOTE
Encounter addended by: Claudia Kowalski R.N. on: 6/2/2025 10:29 AM   Actions taken: Visit diagnoses modified, Charge Capture section accepted

## 2025-06-02 NOTE — PROGRESS NOTES
"Pharmacy Chemotherapy Calculations:    Dx: Rectal adenocarcinoma  Cycle 5  Previous treatment = C4 5/19/25    Protocol: [id number]  *Dosing Reference*  Oxaliplatin 85 g/m2 IV over 2 hours concurrent with  Leucovorin 400 mg/m2 IV over 2 hours followed by  Fluorouracil 400 mg/m2 IV push followed by  Fluorouracil 2400 mg/m2 CIVI over 46 hours  14-day cycle until DP/UT  NCCN Guidelines for Rectal Cancer V.1.2024.  Efrain BAKER. et al. BMC Cancer. 2007:7:91.9  Alan SL, et al. Br J Cancer. 2002:87(4):393-9.  George AP, et al. NICHOLAS. 2017:317(23):7549-8058.    Allergies:  Patient has no known allergies.       BP (!) 151/78   Pulse 76   Temp 36.3 °C (97.4 °F) (Temporal)   Resp 18   Ht 1.803 m (5' 10.98\")   Wt 85 kg (187 lb 6.3 oz)   SpO2 98%   BMI 26.15 kg/m²  Body surface area is 2.06 meters squared.    Labs 6/2/25:  ANC~ 1480 Plt = 135k   Hgb = 11.6     SCr = 1.17 mg/dL CrCl ~ 80.5 mL/min   AST/ALT/AP = 31/28/81 TBili = 0.5     Oxaliplatin 85 mg/m² x 2.06 m² = 175.1 mg   <10% difference, OK to treat with final dose = 180 mg IV    Leucovorin 400 mg/m² x 2.06  m² =824 mg   <10% difference, OK to treat with final dose = 850 mg IV    Fluorouracil 400 mg/m² x 2.06 m² = 824 mg   <10% difference, OK to treat with final dose = 850 mg IV    Fluorouracil 2400 mg/m² x 2.06 m² = 4944 mg  <10% difference, OK to treat with final dose = 5350 mg CIVI over 46 hours at 2.3 mL/hr    Yeimi Jaquez, PharmD  "

## 2025-06-02 NOTE — PROGRESS NOTES
Pt arrives to medical oncology for port flush with labs.  R chest port accessed in sterile manner. Brisk blood return obtained. Labs collected: CBC, CMP, CEA.  Port flushed per [facility] policy and left accessed per Dr. Nesbitt. Pt has infusion appt today.  Pt released ambulatory from clinic in no apparent distress.

## 2025-06-02 NOTE — PROGRESS NOTES
Chemotherapy Verification - SECONDARY RN       Height = 1.803m  Weight = 85kg  BSA = 2.06m2       Medication: oxaliplatin  Dose: 85mg/m2  Calculated Dose: 175.1mg                             (In mg/m2, AUC, mg/kg)     Medication: fluorouracil  Dose: 400mg/m2  Calculated Dose: 824mg                             (In mg/m2, AUC, mg/kg)    Medication: fluorouracil  Dose: 2400mg/m2  Calculated Dose: 4944mg                             (In mg/m2, AUC, mg/kg)      I confirm that this process was performed independently.

## 2025-06-04 ENCOUNTER — OUTPATIENT INFUSION SERVICES (OUTPATIENT)
Dept: ONCOLOGY | Facility: MEDICAL CENTER | Age: 60
End: 2025-06-04
Attending: STUDENT IN AN ORGANIZED HEALTH CARE EDUCATION/TRAINING PROGRAM
Payer: MEDICARE

## 2025-06-04 VITALS
DIASTOLIC BLOOD PRESSURE: 66 MMHG | RESPIRATION RATE: 18 BRPM | TEMPERATURE: 97.4 F | HEART RATE: 64 BPM | OXYGEN SATURATION: 96 % | SYSTOLIC BLOOD PRESSURE: 129 MMHG

## 2025-06-04 DIAGNOSIS — C20 RECTAL CANCER (HCC): Primary | ICD-10-CM

## 2025-06-04 PROCEDURE — 96523 IRRIG DRUG DELIVERY DEVICE: CPT

## 2025-06-04 RX ORDER — 0.9 % SODIUM CHLORIDE 0.9 %
20 VIAL (ML) INJECTION PRN
Status: DISCONTINUED | OUTPATIENT
Start: 2025-06-04 | End: 2025-06-04 | Stop reason: HOSPADM

## 2025-06-04 NOTE — PROGRESS NOTES
Mr Eloy is here today for 5 FU CADD pump removal.     He reports tolerating infusion well, completed and turned off the pump.     Mediport flushed per protocol. Lennon removed and gauze applied to site.     Discharged in stable condition.   Pt will return in two weeks.

## 2025-06-11 RX ORDER — 0.9 % SODIUM CHLORIDE 0.9 %
20 VIAL (ML) INJECTION PRN
Status: CANCELLED | OUTPATIENT
Start: 2025-06-18

## 2025-06-11 RX ORDER — 0.9 % SODIUM CHLORIDE 0.9 %
VIAL (ML) INJECTION PRN
Status: CANCELLED | OUTPATIENT
Start: 2025-06-16

## 2025-06-11 RX ORDER — 0.9 % SODIUM CHLORIDE 0.9 %
10 VIAL (ML) INJECTION PRN
Status: CANCELLED | OUTPATIENT
Start: 2025-06-15

## 2025-06-11 RX ORDER — METHYLPREDNISOLONE SODIUM SUCCINATE 125 MG/2ML
125 INJECTION, POWDER, LYOPHILIZED, FOR SOLUTION INTRAMUSCULAR; INTRAVENOUS PRN
Status: CANCELLED | OUTPATIENT
Start: 2025-06-16

## 2025-06-11 RX ORDER — DEXAMETHASONE SODIUM PHOSPHATE 4 MG/ML
12 INJECTION, SOLUTION INTRA-ARTICULAR; INTRALESIONAL; INTRAMUSCULAR; INTRAVENOUS; SOFT TISSUE ONCE
Status: CANCELLED | OUTPATIENT
Start: 2025-06-16 | End: 2025-06-16

## 2025-06-11 RX ORDER — 0.9 % SODIUM CHLORIDE 0.9 %
3 VIAL (ML) INJECTION PRN
Status: CANCELLED | OUTPATIENT
Start: 2025-06-16

## 2025-06-11 RX ORDER — 0.9 % SODIUM CHLORIDE 0.9 %
3 VIAL (ML) INJECTION PRN
Status: CANCELLED | OUTPATIENT
Start: 2025-06-15

## 2025-06-11 RX ORDER — DEXTROSE MONOHYDRATE 50 MG/ML
INJECTION, SOLUTION INTRAVENOUS CONTINUOUS
Status: CANCELLED | OUTPATIENT
Start: 2025-06-16

## 2025-06-11 RX ORDER — DIPHENHYDRAMINE HYDROCHLORIDE 50 MG/ML
50 INJECTION, SOLUTION INTRAMUSCULAR; INTRAVENOUS PRN
Status: CANCELLED | OUTPATIENT
Start: 2025-06-16

## 2025-06-11 RX ORDER — EPINEPHRINE 1 MG/ML(1)
0.5 AMPUL (ML) INJECTION PRN
Status: CANCELLED | OUTPATIENT
Start: 2025-06-16

## 2025-06-11 RX ORDER — 0.9 % SODIUM CHLORIDE 0.9 %
VIAL (ML) INJECTION PRN
Status: CANCELLED | OUTPATIENT
Start: 2025-06-15

## 2025-06-11 RX ORDER — ONDANSETRON 8 MG/1
8 TABLET, ORALLY DISINTEGRATING ORAL PRN
Status: CANCELLED | OUTPATIENT
Start: 2025-06-16

## 2025-06-11 RX ORDER — 0.9 % SODIUM CHLORIDE 0.9 %
10 VIAL (ML) INJECTION PRN
Status: CANCELLED | OUTPATIENT
Start: 2025-06-16

## 2025-06-11 RX ORDER — ONDANSETRON 2 MG/ML
4 INJECTION INTRAMUSCULAR; INTRAVENOUS PRN
Status: CANCELLED | OUTPATIENT
Start: 2025-06-16

## 2025-06-11 RX ORDER — PROCHLORPERAZINE MALEATE 10 MG
10 TABLET ORAL EVERY 6 HOURS PRN
Status: CANCELLED | OUTPATIENT
Start: 2025-06-16

## 2025-06-11 RX ORDER — PALONOSETRON 0.05 MG/ML
0.25 INJECTION, SOLUTION INTRAVENOUS ONCE
Status: CANCELLED | OUTPATIENT
Start: 2025-06-16 | End: 2025-06-16

## 2025-06-11 ASSESSMENT — ENCOUNTER SYMPTOMS
VOMITING: 0
MYALGIAS: 0
TINGLING: 0
DIARRHEA: 0
FEVER: 0
CONSTIPATION: 1
ABDOMINAL PAIN: 0
DOUBLE VISION: 0
CHILLS: 0
DIZZINESS: 0
HEARTBURN: 0
SENSORY CHANGE: 0
BLURRED VISION: 0
HEADACHES: 0
WEIGHT LOSS: 0
DIAPHORESIS: 0
BLOOD IN STOOL: 0
NAUSEA: 0
COUGH: 0
FOCAL WEAKNESS: 0
SHORTNESS OF BREATH: 0
TREMORS: 0
BRUISES/BLEEDS EASILY: 0

## 2025-06-11 NOTE — PROGRESS NOTES
"Follow Up Note:  Hematology/Oncology    Current Diagnosis and Staging: rectal adenocarcinoma, [id number] stage IIA disease, pMMR.   Date of Diagnosis: dec 2024    Chief Complaint: Patient seen today for evaluation prior to cycle 6 for continued monitoring of symptoms and side effects of cancer treatments.     Care Team:   Pcp Not In Computer  Dr Nesbitt    Oncology History of Presenting Illness:   Per Dr Nesbitt \"Martínez Lyons Jr. is a 59 y.o.  man who presents to the clinic for evaluation for systemic therapy for a new diagnosis of rectal adenocarcinoma. He had been having blood in his stool with irregular bowel habits for 1 year, and finally went to be evaluated. He had never had a colonoscopy before now. His scope revealed a mass in the rectum, and subsequent MRI rectal protocol revealed a cT3N0 disease. Flexible sigmoidoscopy with repeat biopsy revealed invasive moderately differentiated adenocarcinoma, pMMR. He was referred for evaluation accordingly.\"  He initiated Capecitabine with concurrent XR on 2/10/25 , followed by FOLFOX and then surgical evaluation     Current Treatment: FOLFOX6  Treatment History:   02/10/25-03/24/25: IMRT + xeloda  04/07/25: C1 FOLFOX6  04/21/25: C2 FOLFOX6   05/05/25: C3 FOLFOX6   05/19/25: C4 FOLFOX6   06/02/25: C5 FOLFOX6  06/14/25: C6 FOLFOX6 (scheduled)    Interval History Mamadou MONROE:  Martínez returns to clinic today for a pretreatment appointment.  He reports persistent fatigue, Cold sensitivity hands & mouth and tingling in hands, peeling on the hands, Constipation during the week after treatment. His symptoms are stable and currently well controlled/manageable with rest, unscented hand lotion, MiraLAX, respectively.  His weight is stable, his appetite is fair, he is working on staying hydrated and eating small frequent meals.    Allergies as of 06/12/2025    (No Known Allergies)       Current Outpatient Medications:     losartan-hydrochlorothiazide (HYZAAR) 100-12.5 " MG per tablet, Take 1 Tablet by mouth every day., Disp: , Rfl:     ondansetron (ZOFRAN) 4 MG Tab tablet, Take 1 Tablet by mouth every four hours as needed for Nausea/Vomiting., Disp: 20 Tablet, Rfl: 3    prochlorperazine (COMPAZINE) 10 MG Tab, Take 1 Tablet by mouth every 6 hours as needed for Nausea/Vomiting., Disp: 30 Tablet, Rfl: 3    acetaminophen (TYLENOL) 325 MG Tab, Take 650 mg by mouth every four hours as needed for Mild Pain., Disp: , Rfl:   Review of Systems:  Review of Systems   Constitutional:  Positive for malaise/fatigue (mild, able to walk his dog few times per day 30 min instead of 90). Negative for chills, diaphoresis, fever and weight loss.   HENT:  Negative for tinnitus.    Eyes:  Negative for blurred vision and double vision.   Respiratory:  Negative for cough and shortness of breath.    Cardiovascular:  Negative for chest pain.   Gastrointestinal:  Positive for constipation (miralax works well). Negative for abdominal pain, blood in stool, diarrhea, heartburn, melena, nausea and vomiting.   Genitourinary:  Negative for dysuria and hematuria.   Musculoskeletal:  Negative for myalgias.   Skin:  Negative for rash.        Dry, peeling skin on hands     Neurological:  Negative for dizziness, tingling, tremors, sensory change, focal weakness and headaches.   Endo/Heme/Allergies:  Does not bruise/bleed easily.     Physical Exam:  Patient reported vitals:   Temp 97F  Weight 186.6lb  /90  SPO 96%  Pulse 70    Karnofsky Performance Status: 100  Physical Exam  Constitutional:       General: He is not in acute distress.     Appearance: Normal appearance. He is not ill-appearing or toxic-appearing.      Comments: Physical exam is limited due to nature of telemedicine visit   HENT:      Head: Normocephalic.   Pulmonary:      Effort: Pulmonary effort is normal.   Neurological:      Mental Status: He is alert.   Psychiatric:         Mood and Affect: Mood normal.         Behavior: Behavior normal.        Labs:   Latest Reference Range & Units 06/02/25 10:05   WBC 4.8 - 10.8 K/uL 2.3 (L)   RBC 4.70 - 6.10 M/uL 3.68 (L)   Hemoglobin 14.0 - 18.0 g/dL 11.6 (L)   Hematocrit 42.0 - 52.0 % 33.3 (L)   MCV 81.4 - 97.8 fL 90.5   MCH 27.0 - 33.0 pg 31.5   MCHC 32.3 - 36.5 g/dL 34.8   RDW 35.9 - 50.0 fL 54.2 (H)   Platelet Count 164 - 446 K/uL 135 (L)   MPV 9.0 - 12.9 fL 9.0   Neutrophils-Polys 44.00 - 72.00 % 63.50   Neutrophils (Absolute) 1.82 - 7.42 K/uL 1.48 (L)   Bands-Stabs 0.00 - 10.00 % 0.90   Lymphocytes 22.00 - 41.00 % 20.00 (L)   Lymphs (Absolute) 1.00 - 4.80 K/uL 0.46 (L)   Monocytes 0.00 - 13.40 % 12.20   Monos (Absolute) 0.00 - 0.85 K/uL 0.30   Eosinophils 0.00 - 6.90 % 0.80   Eos (Absolute) 0.00 - 0.51 K/uL 0.02   Basophils 0.00 - 1.80 % 2.60 (H)   Baso (Absolute) 0.00 - 0.12 K/uL 0.06   Nucleated RBC 0.00 - 0.20 /100 WBC 0.00   NRBC (Absolute) K/uL 0.00   Plt Estimation  Decreased   RBC Morphology  Present   Anisocytosis  1+   Microcytosis  1+   Poikilocytosis  1+   Ovalocytes  1+   Peripheral Smear Review  see below   Manual Diff Status  PERFORMED   Sodium 135 - 145 mmol/L 143   Potassium 3.6 - 5.5 mmol/L 3.7   Chloride 96 - 112 mmol/L 107   Co2 20 - 33 mmol/L 23   Anion Gap 7.0 - 16.0  13.0   Glucose 65 - 99 mg/dL 111 (H)   Bun 8 - 22 mg/dL 15   Creatinine 0.50 - 1.40 mg/dL 1.17   GFR (CKD-EPI) >60 mL/min/1.73 m 2 71   Calcium 8.5 - 10.5 mg/dL 9.1   Correct Calcium 8.5 - 10.5 mg/dL 9.1   AST(SGOT) 12 - 45 U/L 31   ALT(SGPT) 2 - 50 U/L 28   Alkaline Phosphatase 30 - 99 U/L 81   Total Bilirubin 0.1 - 1.5 mg/dL 0.5   Albumin 3.2 - 4.9 g/dL 4.0   Total Protein 6.0 - 8.2 g/dL 6.4   Globulin 1.9 - 3.5 g/dL 2.4   A-G Ratio g/dL 1.7   Carcinoembryonic Antigen 0.0 - 3.0 ng/mL 2.4     Imaging: last CT CAP 12/27/24    Assessment & Plan:  1. Rectal cancer (HCC)        2. Encounter for long-term current use of high risk medication        3. Oncology follow-up encounter        4. Encounter for chemotherapy management               Rectal adenocarcinoma, [id number] stage IIA disease, pMMR  .Dx Dec 2024, s/p chemoRT, currently on FOLFOX6  Stability of condition: stable    Treatment Plan: FOLFOX6  Patient is meeting criteria to proceed with Cycle 6 of 8, based on the clinical and laboratory information available to me at this time.   -repeat labs prior to Tx    Encouraged unscented hand lotion for peeling  Pt will monitor his cold sensitivity closely & notify us if it progresses, pt has baseline neuropathy/diminished sensation in hands      2. Encounter for High Risk Medication Use  Toxicity: Patient is getting antineoplastic therapy and needs monitoring of blood counts, hepatic function, and renal function due to potential for organ dysfunction. Appropriate lab work has been ordered per treatment plan.   - CBC w/ diff, CMP, CEA    Future Imaging: Repeat MRI rectal protocol and CT c/a/p after completion of therapy (ordered by Dr Andrew, Scheduled in Aug)  Return for Follow Up: 2 weeks for prechemo/RN port lab draw, 6/26/25 with Dr Nesbitt    Any questions and concerns raised by the patient were answered to the best of my ability. Thank you for allowing me to participate in the care for this patient. Please feel free to contact me for any questions or concerns.   Total time spent on chart review, clinic encounter, documentation, coordination of care: 30 minutes.   Please note that this dictation was created using voice recognition software. I have made every reasonable attempt to correct obvious errors, but I expect that there are errors of grammar and possibly content that I did not discover before finalizing the note.   This evaluation was conducted via Teams using secure and encrypted videoconferencing technology. The patient was in their home in the [facility].    The patient's identity was confirmed and verbal consent was obtained for this virtual visit.

## 2025-06-12 ENCOUNTER — HOSPITAL ENCOUNTER (OUTPATIENT)
Dept: HEMATOLOGY ONCOLOGY | Facility: MEDICAL CENTER | Age: 60
End: 2025-06-12
Attending: PHYSICIAN ASSISTANT
Payer: MEDICARE

## 2025-06-12 DIAGNOSIS — C20 RECTAL CANCER (HCC): Primary | ICD-10-CM

## 2025-06-12 DIAGNOSIS — Z51.11 ENCOUNTER FOR CHEMOTHERAPY MANAGEMENT: ICD-10-CM

## 2025-06-12 DIAGNOSIS — Z09 ONCOLOGY FOLLOW-UP ENCOUNTER: ICD-10-CM

## 2025-06-12 DIAGNOSIS — Z79.899 ENCOUNTER FOR LONG-TERM CURRENT USE OF HIGH RISK MEDICATION: ICD-10-CM

## 2025-06-12 PROCEDURE — 99214 OFFICE O/P EST MOD 30 MIN: CPT | Mod: 95 | Performed by: PHYSICIAN ASSISTANT

## 2025-06-13 ENCOUNTER — APPOINTMENT (OUTPATIENT)
Dept: HEMATOLOGY ONCOLOGY | Facility: MEDICAL CENTER | Age: 60
End: 2025-06-13
Attending: STUDENT IN AN ORGANIZED HEALTH CARE EDUCATION/TRAINING PROGRAM
Payer: MEDICARE

## 2025-06-15 NOTE — PROGRESS NOTES
"Pharmacy Chemotherapy Calculation:    Dx: Rectal adenocarcinoma, stage IIA, pMMR          Protocol: mFOLFOX6     *Dosing Reference*  OXALIplatin 85 mg/m2 IV over 2 hours on Day 1 infused concurrently with   Leucovorin 400 mg/m2 IV over 2 hours on Day 1   Fluorouracil 400 mg/m2 IV push followed by  Fluorouracil 2400 mg/m2 CIVI over 46-48 hours   Repeat every 14 days x 4-12 cycles or perioperative therapy (neoadjuvant or adjuvant) or until disease progression or unacceptable toxicity (advanced or metastatic)    NCCN Guidelines for Rectal Cancer V.1.2024.  De Yenifer SYLVESTER et al.J Clin Oncol.2000;18(16):2938-47.  Alan SL, et al.Br J Cancer.2002;87(4):393-9.  Licha-Prashanth F, et al. Tiana Oncol.2000;11(11);1477-83.  Abdi T et al. N Engl J Med.2004;350(23):2343-91.    Allergies:  Patient has no known allergies.     BP (!) 148/90   Pulse 87   Temp 36.7 °C (98 °F) (Temporal)   Resp 18   Ht 1.803 m (5' 10.98\")   Wt 85 kg (187 lb 6.3 oz)   SpO2 97%   BMI 26.15 kg/m²  Body surface area is 2.06 meters squared.    Labs 6/16/25:  ANC~ 1710 Plt = 148k   Hgb = 11.8     SCr = 0.96 mg/dL CrCl ~ 98.1 mL/min   AST/ALT/AP = WNL TBili = 0.6  K+ = 3.9         Drug Order   (Drug name, dose, route, IV Fluid & volume, frequency, number of doses) Cycle 6, Day 1      Previous treatment:C5D1 6/2/25; s/p capecitabine + XRT     Medication = OXALIplatin  Base Dose = 85 mg/m2  Calc Dose: Base Dose x 2.06 m² = 175.1 mg  Final Dose = 180 mg  Route = IV  Fluid & Volume = D5W 250 mL  Admin Duration = Over 2 hours     Infuse concurrently with LCV      <10% difference, okay to treat with final dose     Medication = Leucovorin  Base Dose = 400 mg/m2   Calc Dose: Base Dose x 2.06 m² = 824 mg  Final Dose = 850 mg  Route = IV  Fluid & Volume = D5W 250 mL  Admin Duration = Over 2 hours      Infuse concurrently with OXALIplatin      <10% difference, okay to treat with final dose     Medication = Fluorouracil  Base Dose = 400 mg/m2  Calc Dose:Base " Dose x 2.06 m² = 824 mg  Final Dose = 850 mg  Route = IV  Fluid & Volume = 50 mg/mL; 17 mL  Admin Duration = Over 5 min           <10% difference, okay to treat with final dose     Medication = Fluorouracil  Base Dose = 2400 mg/m2  Calc Dose: Base Dose x 2.06 m² = 4944 mg  Final Dose = 5350 mg  Route = CIVI via CADD pump  Fluid & Volume = 50mg/mL; 107 mL +3 mL overfill   Admin Duration = Over 46 hrs @ 2.3  ml/min          <10% difference, okay to treat with final dose       By my signature below, I confirm this process was performed independently with the BSA and all final chemotherapy dosing calculations congruent. I have reviewed the above chemotherapy order and that my calculation of the final dose and BSA (when applicable) corroborate those calculations of the  pharmacist. Discrepancies of 10% or greater in the written dose have been addressed and documented within the EPIC Progress notes.    Nargis Lozada, PharmD,

## 2025-06-16 ENCOUNTER — HOSPITAL ENCOUNTER (OUTPATIENT)
Facility: MEDICAL CENTER | Age: 60
End: 2025-06-16
Attending: STUDENT IN AN ORGANIZED HEALTH CARE EDUCATION/TRAINING PROGRAM
Payer: MEDICARE

## 2025-06-16 ENCOUNTER — HOSPITAL ENCOUNTER (OUTPATIENT)
Dept: HEMATOLOGY ONCOLOGY | Facility: MEDICAL CENTER | Age: 60
End: 2025-06-16
Attending: STUDENT IN AN ORGANIZED HEALTH CARE EDUCATION/TRAINING PROGRAM
Payer: MEDICARE

## 2025-06-16 ENCOUNTER — OUTPATIENT INFUSION SERVICES (OUTPATIENT)
Dept: ONCOLOGY | Facility: MEDICAL CENTER | Age: 60
End: 2025-06-16
Attending: STUDENT IN AN ORGANIZED HEALTH CARE EDUCATION/TRAINING PROGRAM
Payer: MEDICARE

## 2025-06-16 VITALS
TEMPERATURE: 98 F | DIASTOLIC BLOOD PRESSURE: 90 MMHG | BODY MASS INDEX: 26.23 KG/M2 | HEIGHT: 71 IN | HEART RATE: 87 BPM | RESPIRATION RATE: 18 BRPM | SYSTOLIC BLOOD PRESSURE: 148 MMHG | OXYGEN SATURATION: 97 % | WEIGHT: 187.39 LBS

## 2025-06-16 VITALS
TEMPERATURE: 97.1 F | HEART RATE: 73 BPM | DIASTOLIC BLOOD PRESSURE: 87 MMHG | SYSTOLIC BLOOD PRESSURE: 153 MMHG | OXYGEN SATURATION: 97 % | RESPIRATION RATE: 10 BRPM

## 2025-06-16 DIAGNOSIS — Z79.899 ENCOUNTER FOR LONG-TERM CURRENT USE OF HIGH RISK MEDICATION: ICD-10-CM

## 2025-06-16 DIAGNOSIS — C20 RECTAL CANCER (HCC): ICD-10-CM

## 2025-06-16 DIAGNOSIS — C20 RECTAL CANCER (HCC): Primary | ICD-10-CM

## 2025-06-16 LAB
ALBUMIN SERPL BCP-MCNC: 4.1 G/DL (ref 3.2–4.9)
ALBUMIN/GLOB SERPL: 1.5 G/DL
ALP SERPL-CCNC: 90 U/L (ref 30–99)
ALT SERPL-CCNC: 33 U/L (ref 2–50)
ANION GAP SERPL CALC-SCNC: 11 MMOL/L (ref 7–16)
AST SERPL-CCNC: 35 U/L (ref 12–45)
BASOPHILS # BLD AUTO: 1 % (ref 0–1.8)
BASOPHILS # BLD: 0.03 K/UL (ref 0–0.12)
BILIRUB SERPL-MCNC: 0.6 MG/DL (ref 0.1–1.5)
BUN SERPL-MCNC: 13 MG/DL (ref 8–22)
CALCIUM ALBUM COR SERPL-MCNC: 9.4 MG/DL (ref 8.5–10.5)
CALCIUM SERPL-MCNC: 9.5 MG/DL (ref 8.5–10.5)
CEA SERPL-MCNC: 2.6 NG/ML (ref 0–3)
CHLORIDE SERPL-SCNC: 106 MMOL/L (ref 96–112)
CO2 SERPL-SCNC: 24 MMOL/L (ref 20–33)
CREAT SERPL-MCNC: 0.96 MG/DL (ref 0.5–1.4)
EOSINOPHIL # BLD AUTO: 0.02 K/UL (ref 0–0.51)
EOSINOPHIL NFR BLD: 0.7 % (ref 0–6.9)
ERYTHROCYTE [DISTWIDTH] IN BLOOD BY AUTOMATED COUNT: 53.9 FL (ref 35.9–50)
GFR SERPLBLD CREATININE-BSD FMLA CKD-EPI: 90 ML/MIN/1.73 M 2
GLOBULIN SER CALC-MCNC: 2.7 G/DL (ref 1.9–3.5)
GLUCOSE SERPL-MCNC: 120 MG/DL (ref 65–99)
HCT VFR BLD AUTO: 34.8 % (ref 42–52)
HGB BLD-MCNC: 11.8 G/DL (ref 14–18)
IMM GRANULOCYTES # BLD AUTO: 0.01 K/UL (ref 0–0.11)
IMM GRANULOCYTES NFR BLD AUTO: 0.3 % (ref 0–0.9)
LYMPHOCYTES # BLD AUTO: 0.56 K/UL (ref 1–4.8)
LYMPHOCYTES NFR BLD: 19 % (ref 22–41)
MCH RBC QN AUTO: 30.8 PG (ref 27–33)
MCHC RBC AUTO-ENTMCNC: 33.9 G/DL (ref 32.3–36.5)
MCV RBC AUTO: 90.9 FL (ref 81.4–97.8)
MONOCYTES # BLD AUTO: 0.61 K/UL (ref 0–0.85)
MONOCYTES NFR BLD AUTO: 20.7 % (ref 0–13.4)
NEUTROPHILS # BLD AUTO: 1.71 K/UL (ref 1.82–7.42)
NEUTROPHILS NFR BLD: 58.3 % (ref 44–72)
NRBC # BLD AUTO: 0 K/UL
NRBC BLD-RTO: 0 /100 WBC (ref 0–0.2)
PLATELET # BLD AUTO: 148 K/UL (ref 164–446)
PMV BLD AUTO: 9.3 FL (ref 9–12.9)
POTASSIUM SERPL-SCNC: 3.9 MMOL/L (ref 3.6–5.5)
PROT SERPL-MCNC: 6.8 G/DL (ref 6–8.2)
RBC # BLD AUTO: 3.83 M/UL (ref 4.7–6.1)
SODIUM SERPL-SCNC: 141 MMOL/L (ref 135–145)
WBC # BLD AUTO: 2.9 K/UL (ref 4.8–10.8)

## 2025-06-16 PROCEDURE — 85025 COMPLETE CBC W/AUTO DIFF WBC: CPT

## 2025-06-16 PROCEDURE — 700111 HCHG RX REV CODE 636 W/ 250 OVERRIDE (IP): Performed by: STUDENT IN AN ORGANIZED HEALTH CARE EDUCATION/TRAINING PROGRAM

## 2025-06-16 PROCEDURE — 700111 HCHG RX REV CODE 636 W/ 250 OVERRIDE (IP): Mod: JZ | Performed by: PHYSICIAN ASSISTANT

## 2025-06-16 PROCEDURE — 700105 HCHG RX REV CODE 258: Performed by: STUDENT IN AN ORGANIZED HEALTH CARE EDUCATION/TRAINING PROGRAM

## 2025-06-16 PROCEDURE — 82378 CARCINOEMBRYONIC ANTIGEN: CPT

## 2025-06-16 PROCEDURE — 96367 TX/PROPH/DG ADDL SEQ IV INF: CPT

## 2025-06-16 PROCEDURE — 80053 COMPREHEN METABOLIC PANEL: CPT

## 2025-06-16 PROCEDURE — G0498 CHEMO EXTEND IV INFUS W/PUMP: HCPCS

## 2025-06-16 PROCEDURE — 96415 CHEMO IV INFUSION ADDL HR: CPT

## 2025-06-16 PROCEDURE — 96375 TX/PRO/DX INJ NEW DRUG ADDON: CPT

## 2025-06-16 PROCEDURE — 700105 HCHG RX REV CODE 258: Performed by: PHYSICIAN ASSISTANT

## 2025-06-16 PROCEDURE — 96411 CHEMO IV PUSH ADDL DRUG: CPT

## 2025-06-16 PROCEDURE — 36591 DRAW BLOOD OFF VENOUS DEVICE: CPT

## 2025-06-16 PROCEDURE — 96413 CHEMO IV INFUSION 1 HR: CPT

## 2025-06-16 PROCEDURE — 96376 TX/PRO/DX INJ SAME DRUG ADON: CPT

## 2025-06-16 PROCEDURE — A4212 NON CORING NEEDLE OR STYLET: HCPCS

## 2025-06-16 RX ORDER — METHYLPREDNISOLONE SODIUM SUCCINATE 125 MG/2ML
125 INJECTION, POWDER, LYOPHILIZED, FOR SOLUTION INTRAMUSCULAR; INTRAVENOUS PRN
Status: DISCONTINUED | OUTPATIENT
Start: 2025-06-16 | End: 2025-06-16 | Stop reason: HOSPADM

## 2025-06-16 RX ORDER — EPINEPHRINE 1 MG/ML(1)
0.5 AMPUL (ML) INJECTION PRN
Status: DISCONTINUED | OUTPATIENT
Start: 2025-06-16 | End: 2025-06-16 | Stop reason: HOSPADM

## 2025-06-16 RX ORDER — DEXAMETHASONE SODIUM PHOSPHATE 4 MG/ML
12 INJECTION, SOLUTION INTRA-ARTICULAR; INTRALESIONAL; INTRAMUSCULAR; INTRAVENOUS; SOFT TISSUE ONCE
Status: DISCONTINUED | OUTPATIENT
Start: 2025-06-16 | End: 2025-06-16

## 2025-06-16 RX ORDER — DIPHENHYDRAMINE HYDROCHLORIDE 50 MG/ML
50 INJECTION, SOLUTION INTRAMUSCULAR; INTRAVENOUS PRN
Status: DISCONTINUED | OUTPATIENT
Start: 2025-06-16 | End: 2025-06-16 | Stop reason: HOSPADM

## 2025-06-16 RX ORDER — DEXTROSE MONOHYDRATE 50 MG/ML
INJECTION, SOLUTION INTRAVENOUS CONTINUOUS
Status: DISCONTINUED | OUTPATIENT
Start: 2025-06-16 | End: 2025-06-16 | Stop reason: HOSPADM

## 2025-06-16 RX ORDER — PALONOSETRON 0.05 MG/ML
0.25 INJECTION, SOLUTION INTRAVENOUS ONCE
Status: COMPLETED | OUTPATIENT
Start: 2025-06-16 | End: 2025-06-16

## 2025-06-16 RX ADMIN — DEXAMETHASONE SODIUM PHOSPHATE 12 MG: 4 INJECTION, SOLUTION INTRA-ARTICULAR; INTRALESIONAL; INTRAMUSCULAR; INTRAVENOUS; SOFT TISSUE at 14:12

## 2025-06-16 RX ADMIN — LEUCOVORIN CALCIUM 850 MG: 500 INJECTION, POWDER, LYOPHILIZED, FOR SOLUTION INTRAMUSCULAR; INTRAVENOUS at 15:12

## 2025-06-16 RX ADMIN — FOSAPREPITANT 150 MG: 150 INJECTION, POWDER, LYOPHILIZED, FOR SOLUTION INTRAVENOUS at 14:27

## 2025-06-16 RX ADMIN — FLUOROURACIL 5350 MG: 50 INJECTION, SOLUTION INTRAVENOUS at 17:47

## 2025-06-16 RX ADMIN — OXALIPLATIN 180 MG: 5 INJECTION, SOLUTION INTRAVENOUS at 15:08

## 2025-06-16 RX ADMIN — PALONOSETRON HYDROCHLORIDE 0.25 MG: 0.25 INJECTION INTRAVENOUS at 14:06

## 2025-06-16 RX ADMIN — DEXTROSE MONOHYDRATE: 50 INJECTION, SOLUTION INTRAVENOUS at 14:11

## 2025-06-16 RX ADMIN — FLUOROURACIL 850 MG: 50 INJECTION, SOLUTION INTRAVENOUS at 17:42

## 2025-06-16 ASSESSMENT — FIBROSIS 4 INDEX: FIB4 SCORE: 2.47

## 2025-06-16 NOTE — PROGRESS NOTES
"Pharmacy Chemotherapy Calculations:    Dx: Rectal adenocarcinoma  Cycle 6  Previous treatment = C5 6/2/25    Protocol: mFOLFOX6  *Dosing Reference*  Oxaliplatin 85 g/m2 IV over 2 hours concurrent with  Leucovorin 400 mg/m2 IV over 2 hours followed by  Fluorouracil 400 mg/m2 IV push followed by  Fluorouracil 2400 mg/m2 CIVI over 46 hours  14-day cycle until DP/UT  NCCN Guidelines for Rectal Cancer V.1.2024.  Efrain BAKER. et al. BMC Cancer. 2007:7:91.9  Alan SL, et al. Br J Cancer. 2002:87(4):393-9.  George AP, et al. NICHOLAS. 2017:317(23):2003-6135.    Allergies:  Patient has no known allergies.     BP (!) 148/90   Pulse 87   Temp 36.7 °C (98 °F) (Temporal)   Resp 18   Ht 1.803 m (5' 10.98\")   Wt 85 kg (187 lb 6.3 oz)   SpO2 97%   BMI 26.15 kg/m²  Body surface area is 2.06 meters squared.    Labs 6/16/25  ANC~ 1710 Plt = 148k   Hgb = 11.8     SCr = 0.96 mg/dL CrCl ~ 98.1 mL/min   LFT's = WNLs  TBili = 0.6     Oxaliplatin 85 mg/m² x 2.06 m² = 175.1 mg   <10% difference, OK to treat with final dose = 180 mg IV    Leucovorin 400 mg/m² x 2.06 m² = 824 mg   <10% difference, OK to treat with final dose = 850 mg IV    Fluorouracil 400 mg/m² x 2.06 m² = 824 mg   <10% difference, OK to treat with final dose = 850 mg IV    Fluorouracil 2400 mg/m² x 2.06 m² = 4944 mg  <10% difference, OK to treat with final dose = 5350 mg CIVI over 46 hours at 2.3 mL/hr    César Gaffney, PharmD  "

## 2025-06-16 NOTE — PROGRESS NOTES
Chemotherapy Verification - SECONDARY RN       Height = 180cm  Weight = 85kg  BSA = 2.06       Medication: Oxaliplatin  Dose: 85mg/m2  Calculated Dose: 175.1mg                             (In mg/m2, AUC, mg/kg)     Medication: 5FU IVP  Dose: 400mg/m2  Calculated Dose: 824mg                             (In mg/m2, AUC, mg/kg)    Medication: 5FU CADD pump  Dose: 2400mg/m2  Calculated Dose: 4944mg                             (In mg/m2, AUC, mg/kg)      I confirm that this process was performed independently.

## 2025-06-16 NOTE — PROGRESS NOTES
Pt arrives to medical oncology for port flush with labs. R chest port accessed in sterile manner. Brisk blood return obtained. Labs collected: CBC CMP, CEA. Port flushed per [facility] policy and port left accessed per Dr. Nesbitt. Pt has infusion scheduled today. Pt released ambulatory from clinic in no apparent distress.

## 2025-06-16 NOTE — PROGRESS NOTES
Chemotherapy Verification - PRIMARY RN      Height = 180.3 cm  Weight = 85 kg  BSA = 2.06 m^2       Medication: oxaliplatin (Eloxatin)  Dose: 85 mg/m^2  Calculated Dose: 175.1 mg                             (In mg/m2, AUC, mg/kg)     Medication: fluorouracil (Adrucil) bolus  Dose: 400 mg/m^2  Calculated Dose: 824 mg                             (In mg/m2, AUC, mg/kg)    Medication: fluorouracil (Adrucil)  Dose: 2400 mg/m^2  Calculated Dose: 4944 mg over 46 hours via CADD pump                             (In mg/m2, AUC, mg/kg)    I confirm this process was performed independently with the BSA and all final chemotherapy dosing calculations congruent.  Any discrepancies of 10% or greater have been addressed with the chemotherapy pharmacist. The resolution of the discrepancy has been documented in the EPIC progress notes.

## 2025-06-16 NOTE — PROGRESS NOTES
Martínez is here for Day 1, Cycle 6 mFOLFOX. Port previously accessed. Unable to flush. Biopatch underneath saturated. Port de-accessed. Port accessed using sterile technique; brisk blood return noted. Labs previously drawn and reviewed today and within parameters to proceed with treatment. Pre-medications given per MAR. Oxaliplatin and Leucovorin given per MAR concurrently. 5FU bolus given per MAR. Martínez then connected to 5FU CADD pump. Pump set to RUN mode; all connections open. Next appointment scheduled. Discharged to self care; no apparent distress noted.

## 2025-06-18 ENCOUNTER — OUTPATIENT INFUSION SERVICES (OUTPATIENT)
Dept: ONCOLOGY | Facility: MEDICAL CENTER | Age: 60
End: 2025-06-18
Attending: STUDENT IN AN ORGANIZED HEALTH CARE EDUCATION/TRAINING PROGRAM
Payer: MEDICARE

## 2025-06-18 VITALS
WEIGHT: 190.26 LBS | SYSTOLIC BLOOD PRESSURE: 141 MMHG | HEIGHT: 70 IN | OXYGEN SATURATION: 98 % | BODY MASS INDEX: 27.24 KG/M2 | HEART RATE: 76 BPM | RESPIRATION RATE: 18 BRPM | TEMPERATURE: 97.6 F | DIASTOLIC BLOOD PRESSURE: 69 MMHG

## 2025-06-18 DIAGNOSIS — C20 RECTAL CANCER (HCC): Primary | ICD-10-CM

## 2025-06-18 PROCEDURE — 96523 IRRIG DRUG DELIVERY DEVICE: CPT

## 2025-06-18 ASSESSMENT — FIBROSIS 4 INDEX: FIB4 SCORE: 2.47

## 2025-06-18 NOTE — PROGRESS NOTES
Mr Eloy is here today for CADD pump disconnect. He reports tolerating infusion well.     Medi port flushed per protocol, wood removed and gauze applied to site.     Pt discharged in stable condition.   Confirmed next appointment.

## 2025-06-26 ENCOUNTER — HOSPITAL ENCOUNTER (OUTPATIENT)
Dept: HEMATOLOGY ONCOLOGY | Facility: MEDICAL CENTER | Age: 60
End: 2025-06-26
Attending: STUDENT IN AN ORGANIZED HEALTH CARE EDUCATION/TRAINING PROGRAM
Payer: MEDICARE

## 2025-06-26 VITALS — BODY MASS INDEX: 27.24 KG/M2 | HEIGHT: 70 IN

## 2025-06-26 DIAGNOSIS — C20 RECTAL CANCER (HCC): Primary | ICD-10-CM

## 2025-06-26 RX ORDER — DEXTROSE MONOHYDRATE 50 MG/ML
INJECTION, SOLUTION INTRAVENOUS CONTINUOUS
Status: CANCELLED | OUTPATIENT
Start: 2025-06-30

## 2025-06-26 RX ORDER — METHYLPREDNISOLONE SODIUM SUCCINATE 125 MG/2ML
125 INJECTION, POWDER, LYOPHILIZED, FOR SOLUTION INTRAMUSCULAR; INTRAVENOUS PRN
Status: CANCELLED | OUTPATIENT
Start: 2025-06-30

## 2025-06-26 RX ORDER — 0.9 % SODIUM CHLORIDE 0.9 %
10 VIAL (ML) INJECTION PRN
Status: CANCELLED | OUTPATIENT
Start: 2025-06-29

## 2025-06-26 RX ORDER — PALONOSETRON 0.05 MG/ML
0.25 INJECTION, SOLUTION INTRAVENOUS ONCE
Status: CANCELLED | OUTPATIENT
Start: 2025-06-30 | End: 2025-06-30

## 2025-06-26 RX ORDER — 0.9 % SODIUM CHLORIDE 0.9 %
3 VIAL (ML) INJECTION PRN
Status: CANCELLED | OUTPATIENT
Start: 2025-06-30

## 2025-06-26 RX ORDER — 0.9 % SODIUM CHLORIDE 0.9 %
VIAL (ML) INJECTION PRN
Status: CANCELLED | OUTPATIENT
Start: 2025-06-30

## 2025-06-26 RX ORDER — 0.9 % SODIUM CHLORIDE 0.9 %
3 VIAL (ML) INJECTION PRN
Status: CANCELLED | OUTPATIENT
Start: 2025-06-29

## 2025-06-26 RX ORDER — DIPHENHYDRAMINE HYDROCHLORIDE 50 MG/ML
50 INJECTION, SOLUTION INTRAMUSCULAR; INTRAVENOUS PRN
Status: CANCELLED | OUTPATIENT
Start: 2025-06-30

## 2025-06-26 RX ORDER — ONDANSETRON 2 MG/ML
4 INJECTION INTRAMUSCULAR; INTRAVENOUS PRN
Status: CANCELLED | OUTPATIENT
Start: 2025-06-30

## 2025-06-26 RX ORDER — 0.9 % SODIUM CHLORIDE 0.9 %
10 VIAL (ML) INJECTION PRN
Status: CANCELLED | OUTPATIENT
Start: 2025-06-30

## 2025-06-26 RX ORDER — 0.9 % SODIUM CHLORIDE 0.9 %
VIAL (ML) INJECTION PRN
Status: CANCELLED | OUTPATIENT
Start: 2025-06-29

## 2025-06-26 RX ORDER — ONDANSETRON 8 MG/1
8 TABLET, ORALLY DISINTEGRATING ORAL PRN
Status: CANCELLED | OUTPATIENT
Start: 2025-06-30

## 2025-06-26 RX ORDER — 0.9 % SODIUM CHLORIDE 0.9 %
20 VIAL (ML) INJECTION PRN
OUTPATIENT
Start: 2025-07-02

## 2025-06-26 RX ORDER — PROCHLORPERAZINE MALEATE 10 MG
10 TABLET ORAL EVERY 6 HOURS PRN
Status: CANCELLED | OUTPATIENT
Start: 2025-06-30

## 2025-06-26 RX ORDER — EPINEPHRINE 1 MG/ML(1)
0.5 AMPUL (ML) INJECTION PRN
Status: CANCELLED | OUTPATIENT
Start: 2025-06-30

## 2025-06-26 ASSESSMENT — ENCOUNTER SYMPTOMS
INSOMNIA: 0
SORE THROAT: 0
WEIGHT LOSS: 0
DIZZINESS: 0
ORTHOPNEA: 0
NAUSEA: 0
DOUBLE VISION: 0
ROS GI COMMENTS: RECTAL DISCOMFORT
CONSTIPATION: 0
BLURRED VISION: 0
HEARTBURN: 0
HEADACHES: 0
PALPITATIONS: 0
DIAPHORESIS: 0
FEVER: 0
COUGH: 0
BACK PAIN: 0
BLOOD IN STOOL: 0
SPUTUM PRODUCTION: 0
ABDOMINAL PAIN: 0
CHILLS: 0
MYALGIAS: 0
TINGLING: 0
SINUS PAIN: 0
BRUISES/BLEEDS EASILY: 0
DIARRHEA: 0
VOMITING: 0
NERVOUS/ANXIOUS: 0
SHORTNESS OF BREATH: 0
WEAKNESS: 0
WHEEZING: 0

## 2025-06-26 NOTE — PROGRESS NOTES
Follow Up Note:  Hematology/Oncology    This evaluation was conducted via Teams using secure and encrypted videoconferencing technology. The patient was in their home in the [facility].    The patient's identity was confirmed and verbal consent was obtained for this virtual visit.     Primary Care:  Pcp Pt States None    Diagnosis: Rectal adenocarcinoma    Chief Complaint: On-treatment visit    Current Treatment: Capecitabine with concurrent XRT, followed by FOLFOX and then surgical evaluation    Prior Treatment: NA    Oncology History of Presenting Illness:  Martínez Lyons Jr. is a 59 y.o.  man who presents to the clinic for evaluation for systemic therapy for a new diagnosis of rectal adenocarcinoma. He had been having blood in his stool with irregular bowel habits for 1 year, and finally went to be evaluated. He had never had a colonoscopy before now. His scope revealed a mass in the rectum, and subsequent MRI rectal protocol revealed a cT3N0 disease. Flexible sigmoidoscopy with repeat biopsy revealed invasive moderately differentiated adenocarcinoma, pMMR. He was referred for evaluation accordingly.     Treatment History:   02/10/25: Start capecitabine with concurrent XRT  04/07/25: C1 FOLFOX6  04/21/25: C2 FOLFOX6   05/05/25: C3 FOLFOX6   05/19/25: C4 FOLFOX6   06/02/25: C5 FOLFOX6  06/14/25: C6 FOLFOX6  06/30/25: C7 FOLFOX scheduled    Interval History:  Patient is here for follow up visit. He is going through treatment well but has a lot of fatigue. Neuropathy is stable and he gets some peeling in his hands. Otherwise, he is doing well.     Allergies as of 06/26/2025    (No Known Allergies)         Current Outpatient Medications:     losartan-hydrochlorothiazide (HYZAAR) 100-12.5 MG per tablet, Take 1 Tablet by mouth every day., Disp: , Rfl:     ondansetron (ZOFRAN) 4 MG Tab tablet, Take 1 Tablet by mouth every four hours as needed for Nausea/Vomiting., Disp: 20 Tablet, Rfl: 3     "prochlorperazine (COMPAZINE) 10 MG Tab, Take 1 Tablet by mouth every 6 hours as needed for Nausea/Vomiting., Disp: 30 Tablet, Rfl: 3    acetaminophen (TYLENOL) 325 MG Tab, Take 650 mg by mouth every four hours as needed for Mild Pain., Disp: , Rfl:       Review of Systems:  Review of Systems   Constitutional:  Positive for malaise/fatigue. Negative for chills, diaphoresis, fever and weight loss.   HENT:  Negative for hearing loss, nosebleeds, sinus pain and sore throat.    Eyes:  Negative for blurred vision and double vision.   Respiratory:  Negative for cough, sputum production, shortness of breath and wheezing.    Cardiovascular:  Negative for chest pain, palpitations, orthopnea and leg swelling.   Gastrointestinal:  Negative for abdominal pain, blood in stool, constipation, diarrhea, heartburn, melena, nausea and vomiting.        Rectal discomfort   Genitourinary:  Negative for dysuria, frequency, hematuria and urgency.   Musculoskeletal:  Negative for back pain, joint pain and myalgias.   Skin:  Positive for rash (Some skin peeling on his hands).   Neurological:  Negative for dizziness, tingling, weakness and headaches.   Endo/Heme/Allergies:  Does not bruise/bleed easily.   Psychiatric/Behavioral:  The patient is not nervous/anxious and does not have insomnia.          Physical Exam:  Vitals:    06/26/25 0914   Height: 1.78 m (5' 10.08\")       DESC; KARNOFSKY SCALE WITH ECOG EQUIVALENT: 100, Fully active, able to carry on all pre-disease performed without restriction (ECOG equivalent 0)    DISTRESS LEVEL: no apparent distress    Physical Exam  Constitutional:       General: He is not in acute distress.     Appearance: Normal appearance. He is obese. He is not ill-appearing, toxic-appearing or diaphoretic.   HENT:      Head: Normocephalic and atraumatic.      Nose: Nose normal.   Eyes:      Extraocular Movements: Extraocular movements intact.      Conjunctiva/sclera: Conjunctivae normal.   Pulmonary:      " Effort: Pulmonary effort is normal.   Musculoskeletal:         General: Normal range of motion.      Cervical back: Normal range of motion.   Skin:     Coloration: Skin is not jaundiced or pale.      Findings: No rash.   Neurological:      General: No focal deficit present.      Mental Status: He is alert and oriented to person, place, and time. Mental status is at baseline.      Cranial Nerves: No cranial nerve deficit.      Motor: No weakness.   Psychiatric:         Mood and Affect: Mood normal.         Behavior: Behavior normal.         Thought Content: Thought content normal.         Judgment: Judgment normal.           Labs:  No visits with results within 7 Day(s) from this visit.   Latest known visit with results is:   Hospital Outpatient Visit on 06/16/2025   Component Date Value Ref Range Status    Carcinoembryonic Antigen 06/16/2025 2.6  0.0 - 3.0 ng/mL Final    Comment: Performed using Roche chico e immunoassay analyzer. CEA values determined on  patient samples by different testing procedures cannot be directly compared  with one another and could be the cause of erroneous medical  interpretations. If there is a change in the CEA assay procedure used while  monitoring therapy, then new baselines may need to be established when  comparing previous results.      WBC 06/16/2025 2.9 (L)  4.8 - 10.8 K/uL Final    RBC 06/16/2025 3.83 (L)  4.70 - 6.10 M/uL Final    Hemoglobin 06/16/2025 11.8 (L)  14.0 - 18.0 g/dL Final    Hematocrit 06/16/2025 34.8 (L)  42.0 - 52.0 % Final    MCV 06/16/2025 90.9  81.4 - 97.8 fL Final    MCH 06/16/2025 30.8  27.0 - 33.0 pg Final    MCHC 06/16/2025 33.9  32.3 - 36.5 g/dL Final    RDW 06/16/2025 53.9 (H)  35.9 - 50.0 fL Final    Platelet Count 06/16/2025 148 (L)  164 - 446 K/uL Final    MPV 06/16/2025 9.3  9.0 - 12.9 fL Final    Neutrophils-Polys 06/16/2025 58.30  44.00 - 72.00 % Final    Lymphocytes 06/16/2025 19.00 (L)  22.00 - 41.00 % Final    Monocytes 06/16/2025 20.70 (H)   0.00 - 13.40 % Final    Eosinophils 06/16/2025 0.70  0.00 - 6.90 % Final    Basophils 06/16/2025 1.00  0.00 - 1.80 % Final    Immature Granulocytes 06/16/2025 0.30  0.00 - 0.90 % Final    Nucleated RBC 06/16/2025 0.00  0.00 - 0.20 /100 WBC Final    Neutrophils (Absolute) 06/16/2025 1.71 (L)  1.82 - 7.42 K/uL Final    Includes immature neutrophils, if present.    Lymphs (Absolute) 06/16/2025 0.56 (L)  1.00 - 4.80 K/uL Final    Monos (Absolute) 06/16/2025 0.61  0.00 - 0.85 K/uL Final    Eos (Absolute) 06/16/2025 0.02  0.00 - 0.51 K/uL Final    Baso (Absolute) 06/16/2025 0.03  0.00 - 0.12 K/uL Final    Immature Granulocytes (abs) 06/16/2025 0.01  0.00 - 0.11 K/uL Final    NRBC (Absolute) 06/16/2025 0.00  K/uL Final    Sodium 06/16/2025 141  135 - 145 mmol/L Final    Potassium 06/16/2025 3.9  3.6 - 5.5 mmol/L Final    Chloride 06/16/2025 106  96 - 112 mmol/L Final    Co2 06/16/2025 24  20 - 33 mmol/L Final    Anion Gap 06/16/2025 11.0  7.0 - 16.0 Final    Glucose 06/16/2025 120 (H)  65 - 99 mg/dL Final    Bun 06/16/2025 13  8 - 22 mg/dL Final    Creatinine 06/16/2025 0.96  0.50 - 1.40 mg/dL Final    Calcium 06/16/2025 9.5  8.5 - 10.5 mg/dL Final    Correct Calcium 06/16/2025 9.4  8.5 - 10.5 mg/dL Final    AST(SGOT) 06/16/2025 35  12 - 45 U/L Final    ALT(SGPT) 06/16/2025 33  2 - 50 U/L Final    Alkaline Phosphatase 06/16/2025 90  30 - 99 U/L Final    Total Bilirubin 06/16/2025 0.6  0.1 - 1.5 mg/dL Final    Albumin 06/16/2025 4.1  3.2 - 4.9 g/dL Final    Total Protein 06/16/2025 6.8  6.0 - 8.2 g/dL Final    Globulin 06/16/2025 2.7  1.9 - 3.5 g/dL Final    A-G Ratio 06/16/2025 1.5  g/dL Final    GFR (CKD-EPI) 06/16/2025 90  >60 mL/min/1.73 m 2 Final    Comment: Estimated Glomerular Filtration Rate is calculated using  race neutral CKD-EPI 2021 equation per NKF-ASN recommendations.         Imaging:     All listed images below have been independently reviewed by me. I agree with the findings as summarized below:    No  results found.    Pathology:  FINAL DIAGNOSIS:     A. Rectal mass, biopsy:          Invasive moderately-differentiated adenocarcinoma (see comment).            Tumor cells demonstrate intact nuclear staining for MLH1, MSH2,           MSH6, and PMS2 by immunostains (MMR proficient).     Comment: Part B of this case has been reviewed by a second pathologist,   Dr. Flores, who concurs with the diagnosis of malignancy                                         Diagnosis performed by:                                       BOB CAMPBELL MD     Assessment & Plan:  1. Rectal cancer (HCC)          This is a 59 year old  man with rectal adenocarcinoma, [id number] stage IIA disease, pMMR. He presents for evaluation.      Current Diagnosis and Staging: Rectal adenocarcinoma, [id number] stage IIA disease, pMMR    Update: The patient is doing well at this time. Continue with C7 FOLFOX on Monday.      Treatment Plan: Capecitabine with concurrent XRT, followed by FOLFOX chemotherapy, and then surgical evaluation     Treatment Citation: NCCN     Plan of Care:     Primary Therapy: Continue FOLFOX C7 on Monday.   Supportive Therapy: Antiemetics per protocol  Toxicity: Patient is getting antineoplastic therapy for a life-threatening malignancy and needs monitoring of blood counts, hepatic function, and renal function due to potential for organ dysfunction. This treatment poses a risk of toxicity that could lead to long term disruption of bodily function or death.  Labs: CBC with diff, CMP, CEA monitoring. ctDNA monitoring  Imaging: Repeat MRI rectal protocol and CT c/a/p after completion of therapy  Treatment Planning: Patient will proceed with chemoRT utilizing capecitabine, followed by FOLFOX, and then surgical evaluation.   Consultations: Colorectal surgery (Dr. Soliz); Radiation oncology (Dr. Andrew)  Code Status: Full  Miscellaneous: NA  Return for Follow Up: 2 weeks    Any questions and concerns raised by the patient were  answered to the best of my ability. Thank you for allowing me to participate in the care for this patient. Please feel free to contact me for any questions or concerns.

## 2025-06-29 NOTE — PROGRESS NOTES
"Pharmacy Chemotherapy Calculations:    Dx: Rectal adenocarcinoma  Cycle 7  Previous treatment = C6 6/16/25    Protocol: mFOLFOX6  *Dosing Reference*  Oxaliplatin 85 g/m2 IV over 2 hours concurrent with  Leucovorin 400 mg/m2 IV over 2 hours followed by  Fluorouracil 400 mg/m2 IV push followed by  Fluorouracil 2400 mg/m2 CIVI over 46 hours  14-day cycle until DP/UT  NCCN Guidelines for Rectal Cancer V.1.2024.  Efrain BAKER. et al. BMC Cancer. 2007:7:91.9  Alan SL, et al. Br J Cancer. 2002:87(4):393-9.  George AP, et al. NICHOLAS. 2017:317(23):6175-0686.    Allergies:  Patient has no known allergies.     BP (!) 143/81   Pulse 77   Temp 36.2 °C (97.2 °F) (Temporal)   Resp 18   Ht 1.78 m (5' 10.08\")   Wt 87 kg (191 lb 12.8 oz)   SpO2 98%   BMI 27.46 kg/m²  Body surface area is 2.07 meters squared.    Labs 6/30/25:  ANC~ 1460 Plt = 127k   Hgb = 11.2     SCr = 0.97 mg/dL CrCl ~ 99 mL/min   AST/ALT/AP = 49/45/86 TBili = 0.4     Oxaliplatin 85 mg/m² x 2.07 m² = 175.95 mg   <10% difference, OK to treat with final dose = 180 mg IV    Leucovorin 400 mg/m² x 2.07 m² = 828 mg   <10% difference, OK to treat with final dose = 850 mg IV    Fluorouracil 400 mg/m² x 2.07 m² = 828 mg   <10% difference, OK to treat with final dose = 850 mg IV    Fluorouracil 2400 mg/m² x 2.07 m² = 4968 mg  <10% difference, OK to treat with final dose = 5350 mg CIVI over 46 hours at 2.3 mL/hr    Joao Petty, PharmD  "

## 2025-06-30 ENCOUNTER — HOSPITAL ENCOUNTER (OUTPATIENT)
Facility: MEDICAL CENTER | Age: 60
End: 2025-06-30
Attending: STUDENT IN AN ORGANIZED HEALTH CARE EDUCATION/TRAINING PROGRAM
Payer: MEDICARE

## 2025-06-30 ENCOUNTER — HOSPITAL ENCOUNTER (OUTPATIENT)
Dept: HEMATOLOGY ONCOLOGY | Facility: MEDICAL CENTER | Age: 60
End: 2025-06-30
Attending: STUDENT IN AN ORGANIZED HEALTH CARE EDUCATION/TRAINING PROGRAM
Payer: MEDICARE

## 2025-06-30 ENCOUNTER — OUTPATIENT INFUSION SERVICES (OUTPATIENT)
Dept: ONCOLOGY | Facility: MEDICAL CENTER | Age: 60
End: 2025-06-30
Attending: STUDENT IN AN ORGANIZED HEALTH CARE EDUCATION/TRAINING PROGRAM
Payer: MEDICARE

## 2025-06-30 VITALS
HEART RATE: 77 BPM | DIASTOLIC BLOOD PRESSURE: 81 MMHG | RESPIRATION RATE: 18 BRPM | BODY MASS INDEX: 27.46 KG/M2 | SYSTOLIC BLOOD PRESSURE: 143 MMHG | WEIGHT: 191.8 LBS | HEIGHT: 70 IN | TEMPERATURE: 97.2 F | OXYGEN SATURATION: 98 %

## 2025-06-30 VITALS
DIASTOLIC BLOOD PRESSURE: 89 MMHG | OXYGEN SATURATION: 97 % | SYSTOLIC BLOOD PRESSURE: 155 MMHG | HEART RATE: 64 BPM | TEMPERATURE: 97.1 F | RESPIRATION RATE: 17 BRPM

## 2025-06-30 DIAGNOSIS — C20 RECTAL CANCER (HCC): Primary | ICD-10-CM

## 2025-06-30 DIAGNOSIS — Z79.899 ENCOUNTER FOR LONG-TERM CURRENT USE OF HIGH RISK MEDICATION: ICD-10-CM

## 2025-06-30 DIAGNOSIS — C20 RECTAL CANCER (HCC): ICD-10-CM

## 2025-06-30 DIAGNOSIS — K62.89 RECTAL MASS: ICD-10-CM

## 2025-06-30 LAB
ALBUMIN SERPL BCP-MCNC: 3.9 G/DL (ref 3.2–4.9)
ALBUMIN/GLOB SERPL: 1.4 G/DL
ALP SERPL-CCNC: 86 U/L (ref 30–99)
ALT SERPL-CCNC: 45 U/L (ref 2–50)
ANION GAP SERPL CALC-SCNC: 12 MMOL/L (ref 7–16)
AST SERPL-CCNC: 49 U/L (ref 12–45)
BASOPHILS # BLD AUTO: 0.7 % (ref 0–1.8)
BASOPHILS # BLD: 0.02 K/UL (ref 0–0.12)
BILIRUB SERPL-MCNC: 0.4 MG/DL (ref 0.1–1.5)
BUN SERPL-MCNC: 18 MG/DL (ref 8–22)
CALCIUM ALBUM COR SERPL-MCNC: 9.4 MG/DL (ref 8.5–10.5)
CALCIUM SERPL-MCNC: 9.3 MG/DL (ref 8.5–10.5)
CEA SERPL-MCNC: 2.6 NG/ML (ref 0–3)
CHLORIDE SERPL-SCNC: 106 MMOL/L (ref 96–112)
CO2 SERPL-SCNC: 22 MMOL/L (ref 20–33)
CREAT SERPL-MCNC: 0.97 MG/DL (ref 0.5–1.4)
EOSINOPHIL # BLD AUTO: 0.02 K/UL (ref 0–0.51)
EOSINOPHIL NFR BLD: 0.7 % (ref 0–6.9)
ERYTHROCYTE [DISTWIDTH] IN BLOOD BY AUTOMATED COUNT: 56.5 FL (ref 35.9–50)
GFR SERPLBLD CREATININE-BSD FMLA CKD-EPI: 89 ML/MIN/1.73 M 2
GLOBULIN SER CALC-MCNC: 2.7 G/DL (ref 1.9–3.5)
GLUCOSE SERPL-MCNC: 103 MG/DL (ref 65–99)
HCT VFR BLD AUTO: 34.3 % (ref 42–52)
HGB BLD-MCNC: 11.2 G/DL (ref 14–18)
IMM GRANULOCYTES # BLD AUTO: 0.01 K/UL (ref 0–0.11)
IMM GRANULOCYTES NFR BLD AUTO: 0.4 % (ref 0–0.9)
LYMPHOCYTES # BLD AUTO: 0.56 K/UL (ref 1–4.8)
LYMPHOCYTES NFR BLD: 19.7 % (ref 22–41)
MCH RBC QN AUTO: 30.7 PG (ref 27–33)
MCHC RBC AUTO-ENTMCNC: 32.7 G/DL (ref 32.3–36.5)
MCV RBC AUTO: 94 FL (ref 81.4–97.8)
MONOCYTES # BLD AUTO: 0.77 K/UL (ref 0–0.85)
MONOCYTES NFR BLD AUTO: 27.1 % (ref 0–13.4)
NEUTROPHILS # BLD AUTO: 1.46 K/UL (ref 1.82–7.42)
NEUTROPHILS NFR BLD: 51.4 % (ref 44–72)
NRBC # BLD AUTO: 0 K/UL
NRBC BLD-RTO: 0 /100 WBC (ref 0–0.2)
PLATELET # BLD AUTO: 127 K/UL (ref 164–446)
PMV BLD AUTO: 9.2 FL (ref 9–12.9)
POTASSIUM SERPL-SCNC: 4 MMOL/L (ref 3.6–5.5)
PROT SERPL-MCNC: 6.6 G/DL (ref 6–8.2)
RBC # BLD AUTO: 3.65 M/UL (ref 4.7–6.1)
SODIUM SERPL-SCNC: 140 MMOL/L (ref 135–145)
WBC # BLD AUTO: 2.8 K/UL (ref 4.8–10.8)

## 2025-06-30 PROCEDURE — 700105 HCHG RX REV CODE 258: Performed by: STUDENT IN AN ORGANIZED HEALTH CARE EDUCATION/TRAINING PROGRAM

## 2025-06-30 PROCEDURE — 36591 DRAW BLOOD OFF VENOUS DEVICE: CPT

## 2025-06-30 PROCEDURE — A4212 NON CORING NEEDLE OR STYLET: HCPCS

## 2025-06-30 PROCEDURE — 96415 CHEMO IV INFUSION ADDL HR: CPT

## 2025-06-30 PROCEDURE — 96375 TX/PRO/DX INJ NEW DRUG ADDON: CPT

## 2025-06-30 PROCEDURE — 999999 HB NO CHARGE

## 2025-06-30 PROCEDURE — 96411 CHEMO IV PUSH ADDL DRUG: CPT

## 2025-06-30 PROCEDURE — G0498 CHEMO EXTEND IV INFUS W/PUMP: HCPCS

## 2025-06-30 PROCEDURE — 96413 CHEMO IV INFUSION 1 HR: CPT

## 2025-06-30 PROCEDURE — 80053 COMPREHEN METABOLIC PANEL: CPT

## 2025-06-30 PROCEDURE — 700111 HCHG RX REV CODE 636 W/ 250 OVERRIDE (IP): Performed by: STUDENT IN AN ORGANIZED HEALTH CARE EDUCATION/TRAINING PROGRAM

## 2025-06-30 PROCEDURE — 82378 CARCINOEMBRYONIC ANTIGEN: CPT

## 2025-06-30 PROCEDURE — 85025 COMPLETE CBC W/AUTO DIFF WBC: CPT

## 2025-06-30 PROCEDURE — 96367 TX/PROPH/DG ADDL SEQ IV INF: CPT

## 2025-06-30 RX ORDER — PALONOSETRON 0.05 MG/ML
0.25 INJECTION, SOLUTION INTRAVENOUS ONCE
Status: COMPLETED | OUTPATIENT
Start: 2025-06-30 | End: 2025-06-30

## 2025-06-30 RX ADMIN — PALONOSETRON HYDROCHLORIDE 0.25 MG: 0.25 INJECTION INTRAVENOUS at 10:09

## 2025-06-30 RX ADMIN — FOSAPREPITANT 150 MG: 150 INJECTION, POWDER, LYOPHILIZED, FOR SOLUTION INTRAVENOUS at 10:19

## 2025-06-30 RX ADMIN — OXALIPLATIN 180 MG: 5 INJECTION, SOLUTION INTRAVENOUS at 11:00

## 2025-06-30 RX ADMIN — FLUOROURACIL 850 MG: 50 INJECTION, SOLUTION INTRAVENOUS at 13:20

## 2025-06-30 RX ADMIN — DEXAMETHASONE SODIUM PHOSPHATE 12 MG: 4 INJECTION, SOLUTION INTRA-ARTICULAR; INTRALESIONAL; INTRAMUSCULAR; INTRAVENOUS; SOFT TISSUE at 10:09

## 2025-06-30 RX ADMIN — FLUOROURACIL 5350 MG: 50 INJECTION, SOLUTION INTRAVENOUS at 13:21

## 2025-06-30 RX ADMIN — LEUCOVORIN CALCIUM 850 MG: 500 INJECTION, POWDER, LYOPHILIZED, FOR SOLUTION INTRAMUSCULAR; INTRAVENOUS at 10:59

## 2025-06-30 ASSESSMENT — FIBROSIS 4 INDEX: FIB4 SCORE: 2.88

## 2025-06-30 NOTE — PROGRESS NOTES
Pt arrives to medical oncology for port flush with labs.  Vitals taken.  No complaints from pt. Patient with emla cream applied to port.  R chest port accessed with sterile technique. Brisk blood return obtained and treatment labs collected.  Port secured with tegaderm and remains accessed for treatment per Dr. Nesbitt. Patient released ambulatory from Med-Onc clinic in no apparent distress.

## 2025-06-30 NOTE — PROGRESS NOTES
Chemotherapy Verification - PRIMARY RN      Height = 178 cm  Weight = 87 kg  BSA = 2.07 m2       Medication: Oxaliplatin  Dose: 85 mg/m2  Calculated Dose: 175.95 mg                             (In mg/m2, AUC, mg/kg)     Medication: Leucovorin  Dose: 400 mg/m2  Calculated Dose: 828 mg                             (In mg/m2, AUC, mg/kg)    Medication: Adrucil  Dose: 400 mg/m2  Calculated Dose: 828 mg                             (In mg/m2, AUC, mg/kg)    Medication: Adrucil  Dose: 2400 mg/m2  Calculated Dose: 4968 mg                             (In mg/m2, AUC, mg/kg)      I confirm this process was performed independently with the BSA and all final chemotherapy dosing calculations congruent.  Any discrepancies of 10% or greater have been addressed with the chemotherapy pharmacist. The resolution of the discrepancy has been documented in the EPIC progress notes.

## 2025-06-30 NOTE — PROGRESS NOTES
Chemotherapy Verification - SECONDARY RN       Height = 1.78m  Weight = 87kg  BSA = 2.07m2       Medication: oxaliplatin  Dose: 85mg/m2  Calculated Dose: 175.95mg                             (In mg/m2, AUC, mg/kg)     Medication: fluorouracil  Dose: 400mg/m2  Calculated Dose: 828mg                             (In mg/m2, AUC, mg/kg)    Medication: fluorouracil  Dose: 2400mg/m2  Calculated Dose: 4968mg                             (In mg/m2, AUC, mg/kg)      I confirm that this process was performed independently.

## 2025-06-30 NOTE — PROGRESS NOTES
Pt arrives to [facility] for cycle 7 of mFOLFOX6.   Discussed plan of care with pt.  Pt denies s/sx of infection.  R chest port accessed in 801, port flushed with NS and brisk blood return observed. Labs reviewed and results meet parameters for treatment.   Pre-medications given.  Oxaliplatin infused concurrently with leucovorin. Brisk blood return noted before pushing Fluorouracil bolus. All infusions tolereated well without adverse reaction.  Port flushed with NS and blood return observed prior to connecting 5FU CADD pump.  5FU CADD pump settings verified by 2 RN's.  CADD pump connected to pt and pump turned on to RUN mode.  Confirmed next appt time on 7/2/25 with pt.  Pt dc home to self care.

## 2025-06-30 NOTE — PROGRESS NOTES
"Pharmacy Chemotherapy Calculation:    Dx: Rectal adenocarcinoma, stage IIA, pMMR          Protocol: mFOLFOX6     *Dosing Reference*  OXALIplatin 85 mg/m2 IV over 2 hours on Day 1 infused concurrently with   Leucovorin 400 mg/m2 IV over 2 hours on Day 1   Fluorouracil 400 mg/m2 IV push followed by  Fluorouracil 2400 mg/m2 CIVI over 46-48 hours   Repeat every 14 days x 4-12 cycles or perioperative therapy (neoadjuvant or adjuvant) or until disease progression or unacceptable toxicity (advanced or metastatic)    NCCN Guidelines for Rectal Cancer V.1.2024.  De Yenifer SYLVESTER et al.J Clin Oncol.2000;18(16):2938-47.  Alan SL, et al.Br J Cancer.2002;87(4):393-9.  Licha-Prashanth F, et al. Tiana Oncol.2000;11(11);1477-83.  Abdi T et al. N Engl J Med.2004;350(23):2343-22.    Allergies:  Patient has no known allergies.     BP (!) 143/81   Pulse 77   Temp 36.2 °C (97.2 °F) (Temporal)   Resp 18   Ht 1.78 m (5' 10.08\")   Wt 87 kg (191 lb 12.8 oz)   SpO2 98%   BMI 27.46 kg/m²  Body surface area is 2.07 meters squared.    Labs 6/30/25:  ANC~ 1460 Plt = 127 k   Hgb = 11.2     SCr = 0.97 mg/dL CrCl ~ 99 mL/min   AST/ALT/AP = 49/45/86 TBili = 0.4   K+ = 4    Drug Order   (Drug name, dose, route, IV Fluid & volume, frequency, number of doses) Cycle 7, Day 1      Previous treatment:C6D1 6/16/25; s/p capecitabine + XRT     Medication = OXALIplatin  Base Dose = 85 mg/m2  Calc Dose: Base Dose x 2.07 m² = 176 mg  Final Dose = 180 mg  Route = IV  Fluid & Volume = D5W 250 mL  Admin Duration = Over 2 hours     Infuse concurrently with LCV      <10% difference, okay to treat with final dose     Medication = Leucovorin  Base Dose = 400 mg/m2   Calc Dose: Base Dose x 2.07 m² = 828 mg  Final Dose = 850 mg  Route = IV  Fluid & Volume = D5W 250 mL  Admin Duration = Over 2 hours      Infuse concurrently with OXALIplatin      <10% difference, okay to treat with final dose     Medication = Fluorouracil  Base Dose = 400 mg/m2  Calc Dose:Base " Dose x 2.07 m² = 828 mg  Final Dose = 850 mg  Route = IV  Fluid & Volume = 50 mg/mL; 17 mL  Admin Duration = Over 5 min           <10% difference, okay to treat with final dose     Medication = Fluorouracil  Base Dose = 2400 mg/m2  Calc Dose: Base Dose x 2.07 m² = 4968 mg  Final Dose = 5350 mg  Route = CIVI via CADD pump  Fluid & Volume = 50mg/mL; 107 mL +3 mL overfill   Admin Duration = Over 46 hrs @ 2.3  ml/min          <10% difference, okay to treat with final dose       By my signature below, I confirm this process was performed independently with the BSA and all final chemotherapy dosing calculations congruent. I have reviewed the above chemotherapy order and that my calculation of the final dose and BSA (when applicable) corroborate those calculations of the  pharmacist. Discrepancies of 10% or greater in the written dose have been addressed and documented within the EPIC Progress notes.    Sabra Puentes, PharmD,

## 2025-07-02 ENCOUNTER — OUTPATIENT INFUSION SERVICES (OUTPATIENT)
Dept: ONCOLOGY | Facility: MEDICAL CENTER | Age: 60
End: 2025-07-02
Attending: STUDENT IN AN ORGANIZED HEALTH CARE EDUCATION/TRAINING PROGRAM
Payer: MEDICARE

## 2025-07-02 VITALS
HEIGHT: 71 IN | WEIGHT: 193.34 LBS | BODY MASS INDEX: 27.07 KG/M2 | SYSTOLIC BLOOD PRESSURE: 147 MMHG | HEART RATE: 65 BPM | RESPIRATION RATE: 18 BRPM | OXYGEN SATURATION: 98 % | DIASTOLIC BLOOD PRESSURE: 90 MMHG | TEMPERATURE: 98 F

## 2025-07-02 DIAGNOSIS — C20 RECTAL CANCER (HCC): Primary | ICD-10-CM

## 2025-07-02 PROCEDURE — 96523 IRRIG DRUG DELIVERY DEVICE: CPT

## 2025-07-02 ASSESSMENT — FIBROSIS 4 INDEX: FIB4 SCORE: 3.45

## 2025-07-03 NOTE — PROGRESS NOTES
Patient came into infusion independently. Orders and vitals reviewed. Pump reviewed with volume of 0.0mL. Pump disconnected. Port flushed and de-accessed. Patient left in stable condition with next appointment confirmed.

## 2025-07-09 ASSESSMENT — ENCOUNTER SYMPTOMS
TINGLING: 0
ABDOMINAL PAIN: 0
NERVOUS/ANXIOUS: 0
BACK PAIN: 0
SINUS PAIN: 0
DIZZINESS: 0
CHILLS: 0
DIARRHEA: 0
SPUTUM PRODUCTION: 0
VOMITING: 0
ORTHOPNEA: 0
MYALGIAS: 0
BLURRED VISION: 0
ROS GI COMMENTS: RECTAL DISCOMFORT
NAUSEA: 0
HEARTBURN: 0
BLOOD IN STOOL: 0
FEVER: 0
DOUBLE VISION: 0
WEIGHT LOSS: 0
INSOMNIA: 0
COUGH: 0
PALPITATIONS: 0
HEADACHES: 0
WHEEZING: 0
DIAPHORESIS: 0
WEAKNESS: 0
BRUISES/BLEEDS EASILY: 0
SORE THROAT: 0

## 2025-07-09 NOTE — PROGRESS NOTES
Follow Up Note:  Hematology/Oncology    This evaluation was conducted via Teams using secure and encrypted videoconferencing technology. The patient was in their home in the state [facility].    The patient's identity was confirmed and verbal consent was obtained for this virtual visit.     Primary Care:  Pcp Pt States None    Diagnosis: Rectal adenocarcinoma    Chief Complaint: On-treatment visit    Current Treatment: Capecitabine with concurrent XRT, followed by FOLFOX and then surgical evaluation    Prior Treatment: NA    Oncology History of Presenting Illness:  Martínez Lyons Jr. is a 59 y.o.  man who presents to the clinic for evaluation for systemic therapy for a new diagnosis of rectal adenocarcinoma. He had been having blood in his stool with irregular bowel habits for 1 year, and finally went to be evaluated. He had never had a colonoscopy before now. His scope revealed a mass in the rectum, and subsequent MRI rectal protocol revealed a cT3N0 disease. Flexible sigmoidoscopy with repeat biopsy revealed invasive moderately differentiated adenocarcinoma, pMMR. He was referred for evaluation accordingly.     Treatment History:   02/10/25: Start capecitabine with concurrent XRT  04/07/25: C1 FOLFOX6  04/21/25: C2 FOLFOX6   05/05/25: C3 FOLFOX6   05/19/25: C4 FOLFOX6   06/02/25: C5 FOLFOX6  06/14/25: C6 FOLFOX6  06/30/25: C7 FOLFOX   07/14/25: C8 FOLFOX  Scheduled    Interval History:  Martínez was seen today prior to C8. He is feeling a little overwhelmed in general, but he is looking forward to the treatment break after this cycle.   He notes persistent tingling & cold sensitivity in the hands with peeling (uses unscented lotion), constipation (improves with miralax), and fatigue. Symtpoms are worse for the few days after treatment and generally improve, although the fatigue is lingering longer with each cycle & has been limiting his ability to complete ADLs to some degree.   He is otherwise at his  baseline.     Allergies as of 07/10/2025    (No Known Allergies)         Current Outpatient Medications:     losartan-hydrochlorothiazide (HYZAAR) 100-12.5 MG per tablet, Take 1 Tablet by mouth every day., Disp: , Rfl:     ondansetron (ZOFRAN) 4 MG Tab tablet, Take 1 Tablet by mouth every four hours as needed for Nausea/Vomiting., Disp: 20 Tablet, Rfl: 3    prochlorperazine (COMPAZINE) 10 MG Tab, Take 1 Tablet by mouth every 6 hours as needed for Nausea/Vomiting., Disp: 30 Tablet, Rfl: 3    acetaminophen (TYLENOL) 325 MG Tab, Take 650 mg by mouth every four hours as needed for Mild Pain., Disp: , Rfl:       Review of Systems:  Review of Systems   Constitutional:  Positive for malaise/fatigue. Negative for chills, diaphoresis, fever and weight loss.   HENT:  Negative for hearing loss, nosebleeds, sinus pain and sore throat.    Eyes:  Negative for blurred vision and double vision.   Respiratory:  Positive for shortness of breath (with exertion, stable). Negative for cough, sputum production and wheezing.    Cardiovascular:  Negative for chest pain, palpitations, orthopnea and leg swelling.   Gastrointestinal:  Positive for constipation. Negative for abdominal pain, blood in stool, diarrhea, heartburn, melena, nausea and vomiting.        Rectal discomfort   Genitourinary:  Negative for dysuria, frequency, hematuria and urgency.   Musculoskeletal:  Negative for back pain, joint pain and myalgias.   Skin:  Positive for rash (Some skin peeling on his hands).   Neurological:  Negative for dizziness, tingling, weakness and headaches.   Endo/Heme/Allergies:  Does not bruise/bleed easily.   Psychiatric/Behavioral:  The patient is not nervous/anxious and does not have insomnia.          Physical Exam:  Patient supplied vitals:  Weight 190.9lb  Temp 96.5F  O2 97%  HR 75  /85      DESC; KARNOFSKY SCALE WITH ECOG EQUIVALENT: 100, Fully active, able to carry on all pre-disease performed without restriction (ECOG equivalent  0)    DISTRESS LEVEL: no apparent distress    Physical Exam  Constitutional:       General: He is not in acute distress.     Appearance: Normal appearance. He is not ill-appearing or toxic-appearing.      Comments: Physical exam is limited due to nature of telemedicine visit   HENT:      Head: Normocephalic.   Pulmonary:      Effort: Pulmonary effort is normal.   Neurological:      Mental Status: He is alert.   Psychiatric:         Mood and Affect: Mood normal.         Behavior: Behavior normal.       Labs:  Labs from 6/30/25 reviewed     Imaging:     All listed images below have been independently reviewed by me. I agree with the findings as summarized below:    No results found.    Pathology:  FINAL DIAGNOSIS:     A. Rectal mass, biopsy:          Invasive moderately-differentiated adenocarcinoma (see comment).            Tumor cells demonstrate intact nuclear staining for MLH1, MSH2,           MSH6, and PMS2 by immunostains (MMR proficient).     Comment: Part B of this case has been reviewed by a second pathologist,   Dr. Flores, who concurs with the diagnosis of malignancy                                         Diagnosis performed by:                                       BOB CAMPBELL MD     Assessment & Plan:  1. Rectal cancer (HCC)        2. Encounter for long-term current use of high risk medication        3. Oncology follow-up encounter        4. Encounter for chemotherapy management          This is a 59 year old  man with rectal adenocarcinoma, [id number] stage IIA disease, pMMR. He presents for evaluation.      Current Diagnosis and Staging: Rectal adenocarcinoma, [id number] stage IIA disease, pMMR    Update: proceed with C8, pending repeat labs. Pt is scheduled for CT & MRI rectal on 8/1/25 & 8/15/25. F/up with Dr Andrew & Dr Nesbitt after imaging to discuss next steps.   Continue supportive care (hand lotion, miralax, antiemetics)     Treatment Plan: Capecitabine with concurrent XRT, followed  by FOLFOX chemotherapy, and then surgical evaluation     Treatment Citation: NCCN     Plan of Care:     Primary Therapy: Continue FOLFOX C8 on Monday.   Supportive Therapy: Antiemetics per protocol  Toxicity: Patient is getting antineoplastic therapy for a life-threatening malignancy and needs monitoring of blood counts, hepatic function, and renal function due to potential for organ dysfunction. This treatment poses a risk of toxicity that could lead to long term disruption of bodily function or death.  Labs: CBC with diff, CMP, CEA monitoring. ctDNA monitoring  Imaging: Repeat MRI rectal protocol and CT c/a/p after completion of therapy  Treatment Planning: Patient will proceed with chemoRT utilizing capecitabine, followed by FOLFOX, and then surgical evaluation.   Consultations: Colorectal surgery (Dr. Soliz); Radiation oncology (Dr. Andrew)  Code Status: Full  Miscellaneous: NA  Return for Follow Up: 4 weeks to review imaging with Dr Nesbitt, sooner as needed      The patient is being treated for a life threatening malignancy.  We are using a treatment that poses a risk of toxicity that could lead to long term disruption of bodily function or death.     Any questions and concerns raised by the patient were answered to the best of my ability. Thank you for allowing me to participate in the care for this patient. Please feel free to contact me for any questions or concerns.   Total time spent on chart review, clinic encounter, documentation, coordination of care: 30 minutes.

## 2025-07-10 ENCOUNTER — TELEPHONE (OUTPATIENT)
Facility: MEDICAL CENTER | Age: 60
End: 2025-07-10
Payer: MEDICARE

## 2025-07-10 ENCOUNTER — HOSPITAL ENCOUNTER (OUTPATIENT)
Dept: HEMATOLOGY ONCOLOGY | Facility: MEDICAL CENTER | Age: 60
End: 2025-07-10
Attending: PHYSICIAN ASSISTANT
Payer: MEDICARE

## 2025-07-10 DIAGNOSIS — C20 RECTAL CANCER (HCC): Primary | ICD-10-CM

## 2025-07-10 DIAGNOSIS — Z51.11 ENCOUNTER FOR CHEMOTHERAPY MANAGEMENT: ICD-10-CM

## 2025-07-10 DIAGNOSIS — Z79.899 ENCOUNTER FOR LONG-TERM CURRENT USE OF HIGH RISK MEDICATION: ICD-10-CM

## 2025-07-10 DIAGNOSIS — Z09 ONCOLOGY FOLLOW-UP ENCOUNTER: ICD-10-CM

## 2025-07-10 PROCEDURE — 99214 OFFICE O/P EST MOD 30 MIN: CPT | Mod: 95 | Performed by: PHYSICIAN ASSISTANT

## 2025-07-10 RX ORDER — 0.9 % SODIUM CHLORIDE 0.9 %
3 VIAL (ML) INJECTION PRN
Status: CANCELLED | OUTPATIENT
Start: 2025-07-14

## 2025-07-10 RX ORDER — PALONOSETRON 0.05 MG/ML
0.25 INJECTION, SOLUTION INTRAVENOUS ONCE
Status: CANCELLED | OUTPATIENT
Start: 2025-07-14 | End: 2025-07-14

## 2025-07-10 RX ORDER — ONDANSETRON 8 MG/1
8 TABLET, ORALLY DISINTEGRATING ORAL PRN
Status: CANCELLED | OUTPATIENT
Start: 2025-07-14

## 2025-07-10 RX ORDER — 0.9 % SODIUM CHLORIDE 0.9 %
3 VIAL (ML) INJECTION PRN
Status: CANCELLED | OUTPATIENT
Start: 2025-07-13

## 2025-07-10 RX ORDER — 0.9 % SODIUM CHLORIDE 0.9 %
VIAL (ML) INJECTION PRN
Status: CANCELLED | OUTPATIENT
Start: 2025-07-14

## 2025-07-10 RX ORDER — 0.9 % SODIUM CHLORIDE 0.9 %
10 VIAL (ML) INJECTION PRN
Status: CANCELLED | OUTPATIENT
Start: 2025-07-13

## 2025-07-10 RX ORDER — PROCHLORPERAZINE MALEATE 10 MG
10 TABLET ORAL EVERY 6 HOURS PRN
Status: CANCELLED | OUTPATIENT
Start: 2025-07-14

## 2025-07-10 RX ORDER — METHYLPREDNISOLONE SODIUM SUCCINATE 125 MG/2ML
125 INJECTION, POWDER, LYOPHILIZED, FOR SOLUTION INTRAMUSCULAR; INTRAVENOUS PRN
Status: CANCELLED | OUTPATIENT
Start: 2025-07-14

## 2025-07-10 RX ORDER — 0.9 % SODIUM CHLORIDE 0.9 %
10 VIAL (ML) INJECTION PRN
Status: CANCELLED | OUTPATIENT
Start: 2025-07-14

## 2025-07-10 RX ORDER — 0.9 % SODIUM CHLORIDE 0.9 %
20 VIAL (ML) INJECTION PRN
Status: CANCELLED | OUTPATIENT
Start: 2025-07-16

## 2025-07-10 RX ORDER — DIPHENHYDRAMINE HYDROCHLORIDE 50 MG/ML
50 INJECTION, SOLUTION INTRAMUSCULAR; INTRAVENOUS PRN
Status: CANCELLED | OUTPATIENT
Start: 2025-07-14

## 2025-07-10 RX ORDER — EPINEPHRINE 1 MG/ML(1)
0.5 AMPUL (ML) INJECTION PRN
Status: CANCELLED | OUTPATIENT
Start: 2025-07-14

## 2025-07-10 RX ORDER — ONDANSETRON 2 MG/ML
4 INJECTION INTRAMUSCULAR; INTRAVENOUS PRN
Status: CANCELLED | OUTPATIENT
Start: 2025-07-14

## 2025-07-10 RX ORDER — DEXTROSE MONOHYDRATE 50 MG/ML
INJECTION, SOLUTION INTRAVENOUS CONTINUOUS
Status: CANCELLED | OUTPATIENT
Start: 2025-07-14

## 2025-07-10 RX ORDER — 0.9 % SODIUM CHLORIDE 0.9 %
VIAL (ML) INJECTION PRN
Status: CANCELLED | OUTPATIENT
Start: 2025-07-13

## 2025-07-10 ASSESSMENT — ENCOUNTER SYMPTOMS
SHORTNESS OF BREATH: 1
CONSTIPATION: 1

## 2025-07-10 NOTE — PROGRESS NOTES
"Pharmacy Chemotherapy Calculation:    Dx: Rectal adenocarcinoma, stage IIA, pMMR          Protocol: [id number]     *Dosing Reference*  OXALIplatin 85 mg/m2 IV over 2 hours on Day 1 infused concurrently with   Leucovorin 400 mg/m2 IV over 2 hours on Day 1   Fluorouracil 400 mg/m2 IV push followed by  Fluorouracil 2400 mg/m2 CIVI over 46-48 hours   Repeat every 14 days x 4-12 cycles or perioperative therapy (neoadjuvant or adjuvant) or until disease progression or unacceptable toxicity (advanced or metastatic)    NCCN Guidelines for Rectal Cancer V.1.2024.  De Yenifer SYLVESTER et al.J Clin Oncol.2000;18(16):2938-47.  Alan SL, et al.Br J Cancer.2002;87(4):393-9.  Licha-Prashanth F, et al. Tiana Oncol.2000;11(11);1477-83.  Abdi T et al. N Engl J Med.2004;350(23):2343-90.    Allergies:  Patient has no known allergies.     BP (!) 146/80   Pulse 82   Temp 36.7 °C (98.1 °F) (Temporal)   Resp 18   Ht 1.803 m (5' 10.98\")   Wt 86.6 kg (190 lb 14.7 oz)   SpO2 97%   BMI 26.64 kg/m²  Body surface area is 2.08 meters squared.    Labs 7/14/25:  ANC~ 1740 Plt = 134k   Hgb = 11.6     SCr = 0.9 mg/dL CrCl ~ 107 mL/min   AST/ALT/AP = WNL TBili = 0.8   K+ = 3.9    Drug Order   (Drug name, dose, route, IV Fluid & volume, frequency, number of doses) Cycle 8, Day 1      Previous treatment:C7D1 6/30/25; s/p capecitabine + XRT     Medication = OXALIplatin  Base Dose = 85 mg/m2  Calc Dose: Base Dose x 2.08 m² = 176.8 mg  Final Dose = 180 mg  Route = IV  Fluid & Volume = D5W 250 mL  Admin Duration = Over 2 hours     Infuse concurrently with LCV      <10% difference, okay to treat with final dose     Medication = Leucovorin  Base Dose = 400 mg/m2   Calc Dose: Base Dose x 2.08 m² = 832 mg  Final Dose = 850 mg  Route = IV  Fluid & Volume = D5W 250 mL  Admin Duration = Over 2 hours      Infuse concurrently with OXALIplatin      <10% difference, okay to treat with final dose     Medication = Fluorouracil  Base Dose = 400 mg/m2  Calc Dose:Base " Dose x 2.08 m² = 832 mg  Final Dose = 850 mg  Route = IV  Fluid & Volume = 50 mg/mL; 17 mL  Admin Duration = Over 5 min           <10% difference, okay to treat with final dose     Medication = Fluorouracil  Base Dose = 2400 mg/m2  Calc Dose: Base Dose x 2.08 m² = 4992 mg  Final Dose = 5350 mg  Route = CIVI via CADD pump  Fluid & Volume = 50mg/mL; 107 mL +3 mL overfill   Admin Duration = Over 46 hrs @ 2.3  ml/min          <10% difference, okay to treat with final dose       By my signature below, I confirm this process was performed independently with the BSA and all final chemotherapy dosing calculations congruent. I have reviewed the above chemotherapy order and that my calculation of the final dose and BSA (when applicable) corroborate those calculations of the  pharmacist. Discrepancies of 10% or greater in the written dose have been addressed and documented within the EPIC Progress notes.    Sabra Puentes, PharmD,

## 2025-07-10 NOTE — TELEPHONE ENCOUNTER
Post treatment appt scheduled.    Future Appointments   Date Time Provider Department Center   7/10/2025 10:30 AM Jenna Cedillo P.A.-C. ON[facility] None   [facility]14[facility]2025 10[facility]   7/16/2025  5:00 [facility]   8/1/2025  9:00 AM 75 [address] CT 1 OCT SARAH [facility]   8/15/2025  8:45 AM 75 SARAH MRI 1 NOEMY [facility]   8/19/2025 11:00 AM Marco Andrew M.D. RADT None   8/21/2025  9:30 AM Chriss Soliz M.D. SRGONC None

## 2025-07-13 NOTE — PROGRESS NOTES
"Pharmacy Chemotherapy Calculations:    Dx: Rectal adenocarcinoma  Cycle 8  Previous treatment = C7 6/30/25    Protocol: [id number]  *Dosing Reference*  Oxaliplatin 85 g/m2 IV over 2 hours concurrent with  Leucovorin 400 mg/m2 IV over 2 hours followed by  Fluorouracil 400 mg/m2 IV push followed by  Fluorouracil 2400 mg/m2 CIVI over 46 hours  14-day cycle until DP/UT  NCCN Guidelines for Rectal Cancer V.1.2024.  Efrain BAKER. et al. BMC Cancer. 2007:7:91.9  Alan SL, et al. Br J Cancer. 2002:87(4):393-9.  George CORRIGAN, et al. NICHOLAS. 2017:317(23):9835-7242.    Allergies:  Patient has no known allergies.     BP (!) 146/80   Pulse 82   Temp 36.7 °C (98.1 °F) (Temporal)   Resp 18   Ht 1.803 m (5' 10.98\")   Wt 86.6 kg (190 lb 14.7 oz)   SpO2 97%   BMI 26.64 kg/m²  Body surface area is 2.08 meters squared.    Labs 7/14/25:  ANC~ 1740 Plt = 134k   Hgb = 11.6     SCr = 0.9 mg/dL CrCl ~ 107 mL/min   AST/ALT/AP = 35/35/91 TBili = 0.8     Oxaliplatin 85 mg/m² x 2.08 m² = 176.8 mg   <10% difference, OK to treat with final dose = 180 mg IV    Leucovorin 400 mg/m² x 2.08 m² = 832 mg   <10% difference, OK to treat with final dose = 850 mg IV    Fluorouracil 400 mg/m² x 2.08 m² = 832 mg   <10% difference, OK to treat with final dose = 850 mg IV    Fluorouracil 2400 mg/m² x 2.08 m² = 4992 mg  <10% difference, OK to treat with final dose = 5350 mg CIVI over 46 hours at 2.3 mL/hr    Joao Petty, PharmD  "

## 2025-07-14 ENCOUNTER — OUTPATIENT INFUSION SERVICES (OUTPATIENT)
Dept: ONCOLOGY | Facility: MEDICAL CENTER | Age: 60
End: 2025-07-14
Attending: STUDENT IN AN ORGANIZED HEALTH CARE EDUCATION/TRAINING PROGRAM
Payer: MEDICARE

## 2025-07-14 VITALS
RESPIRATION RATE: 18 BRPM | BODY MASS INDEX: 26.73 KG/M2 | TEMPERATURE: 98.1 F | OXYGEN SATURATION: 97 % | HEIGHT: 71 IN | SYSTOLIC BLOOD PRESSURE: 146 MMHG | WEIGHT: 190.92 LBS | HEART RATE: 82 BPM | DIASTOLIC BLOOD PRESSURE: 80 MMHG

## 2025-07-14 DIAGNOSIS — C20 RECTAL CANCER (HCC): Primary | ICD-10-CM

## 2025-07-14 LAB
ALBUMIN SERPL BCP-MCNC: 4.1 G/DL (ref 3.2–4.9)
ALBUMIN/GLOB SERPL: 1.5 G/DL
ALP SERPL-CCNC: 91 U/L (ref 30–99)
ALT SERPL-CCNC: 35 U/L (ref 2–50)
ANION GAP SERPL CALC-SCNC: 11 MMOL/L (ref 7–16)
AST SERPL-CCNC: 35 U/L (ref 12–45)
BASOPHILS # BLD AUTO: 0.3 % (ref 0–1.8)
BASOPHILS # BLD: 0.01 K/UL (ref 0–0.12)
BILIRUB SERPL-MCNC: 0.8 MG/DL (ref 0.1–1.5)
BUN SERPL-MCNC: 15 MG/DL (ref 8–22)
CALCIUM ALBUM COR SERPL-MCNC: 9.2 MG/DL (ref 8.5–10.5)
CALCIUM SERPL-MCNC: 9.3 MG/DL (ref 8.5–10.5)
CEA SERPL-MCNC: 2.8 NG/ML (ref 0–3)
CHLORIDE SERPL-SCNC: 104 MMOL/L (ref 96–112)
CO2 SERPL-SCNC: 22 MMOL/L (ref 20–33)
CREAT SERPL-MCNC: 0.9 MG/DL (ref 0.5–1.4)
EOSINOPHIL # BLD AUTO: 0.01 K/UL (ref 0–0.51)
EOSINOPHIL NFR BLD: 0.3 % (ref 0–6.9)
ERYTHROCYTE [DISTWIDTH] IN BLOOD BY AUTOMATED COUNT: 56.3 FL (ref 35.9–50)
GFR SERPLBLD CREATININE-BSD FMLA CKD-EPI: 98 ML/MIN/1.73 M 2
GLOBULIN SER CALC-MCNC: 2.7 G/DL (ref 1.9–3.5)
GLUCOSE SERPL-MCNC: 105 MG/DL (ref 65–99)
HCT VFR BLD AUTO: 33.5 % (ref 42–52)
HGB BLD-MCNC: 11.6 G/DL (ref 14–18)
IMM GRANULOCYTES # BLD AUTO: 0.02 K/UL (ref 0–0.11)
IMM GRANULOCYTES NFR BLD AUTO: 0.6 % (ref 0–0.9)
LYMPHOCYTES # BLD AUTO: 0.65 K/UL (ref 1–4.8)
LYMPHOCYTES NFR BLD: 20.2 % (ref 22–41)
MCH RBC QN AUTO: 31.6 PG (ref 27–33)
MCHC RBC AUTO-ENTMCNC: 34.6 G/DL (ref 32.3–36.5)
MCV RBC AUTO: 91.3 FL (ref 81.4–97.8)
MONOCYTES # BLD AUTO: 0.79 K/UL (ref 0–0.85)
MONOCYTES NFR BLD AUTO: 24.5 % (ref 0–13.4)
NEUTROPHILS # BLD AUTO: 1.74 K/UL (ref 1.82–7.42)
NEUTROPHILS NFR BLD: 54.1 % (ref 44–72)
NRBC # BLD AUTO: 0 K/UL
NRBC BLD-RTO: 0 /100 WBC (ref 0–0.2)
OUTPT INFUS CBC COMMENT OICOM: ABNORMAL
PLATELET # BLD AUTO: 134 K/UL (ref 164–446)
PMV BLD AUTO: 9.3 FL (ref 9–12.9)
POTASSIUM SERPL-SCNC: 3.9 MMOL/L (ref 3.6–5.5)
PROT SERPL-MCNC: 6.8 G/DL (ref 6–8.2)
RBC # BLD AUTO: 3.67 M/UL (ref 4.7–6.1)
SODIUM SERPL-SCNC: 137 MMOL/L (ref 135–145)
WBC # BLD AUTO: 3.2 K/UL (ref 4.8–10.8)

## 2025-07-14 PROCEDURE — 96413 CHEMO IV INFUSION 1 HR: CPT

## 2025-07-14 PROCEDURE — 80053 COMPREHEN METABOLIC PANEL: CPT

## 2025-07-14 PROCEDURE — 96411 CHEMO IV PUSH ADDL DRUG: CPT

## 2025-07-14 PROCEDURE — 700105 HCHG RX REV CODE 258: Performed by: PHYSICIAN ASSISTANT

## 2025-07-14 PROCEDURE — A4212 NON CORING NEEDLE OR STYLET: HCPCS

## 2025-07-14 PROCEDURE — 96367 TX/PROPH/DG ADDL SEQ IV INF: CPT

## 2025-07-14 PROCEDURE — 96415 CHEMO IV INFUSION ADDL HR: CPT

## 2025-07-14 PROCEDURE — 82378 CARCINOEMBRYONIC ANTIGEN: CPT

## 2025-07-14 PROCEDURE — 700111 HCHG RX REV CODE 636 W/ 250 OVERRIDE (IP): Performed by: PHYSICIAN ASSISTANT

## 2025-07-14 PROCEDURE — G0498 CHEMO EXTEND IV INFUS W/PUMP: HCPCS

## 2025-07-14 PROCEDURE — 85025 COMPLETE CBC W/AUTO DIFF WBC: CPT

## 2025-07-14 RX ORDER — PALONOSETRON 0.05 MG/ML
0.25 INJECTION, SOLUTION INTRAVENOUS ONCE
Status: COMPLETED | OUTPATIENT
Start: 2025-07-14 | End: 2025-07-14

## 2025-07-14 RX ADMIN — PALONOSETRON HYDROCHLORIDE 0.25 MG: 0.25 INJECTION INTRAVENOUS at 10:52

## 2025-07-14 RX ADMIN — FLUOROURACIL 5350 MG: 50 INJECTION, SOLUTION INTRAVENOUS at 14:26

## 2025-07-14 RX ADMIN — FOSAPREPITANT 150 MG: 150 INJECTION, POWDER, LYOPHILIZED, FOR SOLUTION INTRAVENOUS at 11:05

## 2025-07-14 RX ADMIN — OXALIPLATIN 180 MG: 5 INJECTION, SOLUTION INTRAVENOUS at 11:51

## 2025-07-14 RX ADMIN — LEUCOVORIN CALCIUM 850 MG: 500 INJECTION, POWDER, LYOPHILIZED, FOR SOLUTION INTRAMUSCULAR; INTRAVENOUS at 11:51

## 2025-07-14 RX ADMIN — DEXAMETHASONE SODIUM PHOSPHATE 12 MG: 4 INJECTION, SOLUTION INTRA-ARTICULAR; INTRALESIONAL; INTRAMUSCULAR; INTRAVENOUS; SOFT TISSUE at 10:53

## 2025-07-14 RX ADMIN — FLUOROURACIL 850 MG: 50 INJECTION, SOLUTION INTRAVENOUS at 14:26

## 2025-07-14 ASSESSMENT — PAIN DESCRIPTION - PAIN TYPE: TYPE: CHRONIC PAIN

## 2025-07-14 ASSESSMENT — FIBROSIS 4 INDEX: FIB4 SCORE: 3.45

## 2025-07-14 NOTE — PROGRESS NOTES
Chemotherapy Verification - PRIMARY RN    C8 D1    Height = 180.3 cm  Weight = 86.6 kg  BSA = 2.08 m^2       Medication: Oxaliplatin  Dose: 85 mg/m^2  Calculated Dose: 176.8 mg                             (In mg/m2, AUC, mg/kg)     Medication: Fluorouracil IV bolus Dose: 400 mg/m^2  Calculated Dose: 832 mg                             (In mg/m2, AUC, mg/kg)    Medication: Fluorouracil CADD pump Dose: 2,400 mg/m^2  Calculated Dose: 4,992 mg infused over 46 hours                             (In mg/m2, AUC, mg/kg)    I confirm this process was performed independently with the BSA and all final chemotherapy dosing calculations congruent.  Any discrepancies of 10% or greater have been addressed with the chemotherapy pharmacist. The resolution of the discrepancy has been documented in the EPIC progress notes.

## 2025-07-14 NOTE — PROGRESS NOTES
Chemotherapy Verification - SECONDARY RN     D1C8    Height = 180cm  Weight = 86.6kg  BSA = 2.08m^2       Medication: oxaliplatin  Dose: 85mg/m^2  Calculated Dose: 176.8 mg                                 Medication: fluorouracil  Dose: 400mg/m^2  Calculated Dose: 832 mg                                 Medication: fluorouracil  Dose: 2400mg/m^2  Calculated Dose: 4992 mg                                  I confirm that this process was performed independently.

## 2025-07-14 NOTE — PROGRESS NOTES
Pt arrives to [facility] for cycle 8 day 1 of FOLFOX.  Pt denies s/s of infection or nausea today.  RUC port accessed using sterile technique and brisk blood return was observed.  Labs drawn via port.  Labs reviewed and results meet parameters for treatment.  Pre-medications given.  Oxaliplatin infused without adverse reaction.  5FU IV bolus given.  Port flushed with NS and blood return observed prior to connecting 5FU CADD pump.  5FU CADD pump settings verified by 2 RN's.  CADD pump connected to pt and pump turned on to RUN mode.  Confirmed next appt time on 7/16/2025 with pt.  Pt dc home to self care.

## 2025-07-16 ENCOUNTER — OUTPATIENT INFUSION SERVICES (OUTPATIENT)
Dept: ONCOLOGY | Facility: MEDICAL CENTER | Age: 60
End: 2025-07-16
Attending: STUDENT IN AN ORGANIZED HEALTH CARE EDUCATION/TRAINING PROGRAM
Payer: MEDICARE

## 2025-07-16 VITALS
RESPIRATION RATE: 16 BRPM | HEART RATE: 78 BPM | OXYGEN SATURATION: 97 % | SYSTOLIC BLOOD PRESSURE: 132 MMHG | DIASTOLIC BLOOD PRESSURE: 71 MMHG | TEMPERATURE: 97.2 F

## 2025-07-16 DIAGNOSIS — C20 RECTAL CANCER (HCC): Primary | ICD-10-CM

## 2025-07-16 PROCEDURE — 700101 HCHG RX REV CODE 250: Performed by: PHYSICIAN ASSISTANT

## 2025-07-16 PROCEDURE — 96523 IRRIG DRUG DELIVERY DEVICE: CPT

## 2025-07-16 RX ORDER — 0.9 % SODIUM CHLORIDE 0.9 %
20 VIAL (ML) INJECTION PRN
Status: DISCONTINUED | OUTPATIENT
Start: 2025-07-16 | End: 2025-07-16 | Stop reason: HOSPADM

## 2025-07-16 RX ADMIN — SODIUM CHLORIDE, PRESERVATIVE FREE 20 ML: 5 INJECTION INTRAVENOUS at 14:30

## 2025-07-17 NOTE — PROGRESS NOTES
Mr. Lyons is here today for CADD pump disconnect. Assessment completed, POC discussed. He reports tolerating infusion well.     Pump confirmed to have completed infusion, cassette empty, 108.4mL delivered. Medi port flushed per protocol, wood removed and Band-Aid applied to site.     Pt aware that today is the final day of treatment for this prescribed regimen. Pt rang the bell and celebrated with staff. Pt aware of no need to schedule future infusion appointment. Pt discharged  to self care in stable condition.

## 2025-07-28 ENCOUNTER — APPOINTMENT (OUTPATIENT)
Dept: ONCOLOGY | Facility: MEDICAL CENTER | Age: 60
End: 2025-07-28
Payer: MEDICARE

## 2025-07-30 ENCOUNTER — APPOINTMENT (OUTPATIENT)
Dept: ONCOLOGY | Facility: MEDICAL CENTER | Age: 60
End: 2025-07-30
Payer: MEDICARE

## 2025-08-01 ENCOUNTER — HOSPITAL ENCOUNTER (OUTPATIENT)
Dept: RADIOLOGY | Facility: MEDICAL CENTER | Age: 60
End: 2025-08-01
Attending: RADIOLOGY
Payer: MEDICARE

## 2025-08-01 DIAGNOSIS — C20 RECTAL CANCER (HCC): ICD-10-CM

## 2025-08-01 PROCEDURE — 700117 HCHG RX CONTRAST REV CODE 255: Performed by: RADIOLOGY

## 2025-08-01 PROCEDURE — 71260 CT THORAX DX C+: CPT

## 2025-08-01 RX ADMIN — IOHEXOL 100 ML: 350 INJECTION, SOLUTION INTRAVENOUS at 08:58

## 2025-08-04 DIAGNOSIS — C78.7 METASTASIS TO LIVER (HCC): Primary | ICD-10-CM

## 2025-08-15 ENCOUNTER — HOSPITAL ENCOUNTER (OUTPATIENT)
Dept: RADIOLOGY | Facility: MEDICAL CENTER | Age: 60
End: 2025-08-15
Attending: RADIOLOGY
Payer: MEDICARE

## 2025-08-15 DIAGNOSIS — C78.7 METASTASIS TO LIVER (HCC): ICD-10-CM

## 2025-08-15 DIAGNOSIS — C20 RECTAL CANCER (HCC): ICD-10-CM

## 2025-08-15 PROCEDURE — 700111 HCHG RX REV CODE 636 W/ 250 OVERRIDE (IP): Mod: JZ,TB | Performed by: RADIOLOGY

## 2025-08-15 PROCEDURE — 700117 HCHG RX CONTRAST REV CODE 255: Mod: JZ | Performed by: RADIOLOGY

## 2025-08-15 PROCEDURE — A9579 GAD-BASE MR CONTRAST NOS,1ML: HCPCS | Mod: JZ | Performed by: RADIOLOGY

## 2025-08-15 PROCEDURE — 74183 MRI ABD W/O CNTR FLWD CNTR: CPT

## 2025-08-15 PROCEDURE — 72197 MRI PELVIS W/O & W/DYE: CPT

## 2025-08-15 RX ORDER — GADOTERIDOL 279.3 MG/ML
20 INJECTION INTRAVENOUS ONCE
Status: COMPLETED | OUTPATIENT
Start: 2025-08-15 | End: 2025-08-15

## 2025-08-15 RX ADMIN — GLUCAGON 1 MG: 1 INJECTION, POWDER, LYOPHILIZED, FOR SOLUTION INTRAMUSCULAR; INTRAVENOUS at 14:58

## 2025-08-15 RX ADMIN — GADOTERIDOL 20 ML: 279.3 INJECTION, SOLUTION INTRAVENOUS at 17:15

## 2025-08-18 ENCOUNTER — HOSPITAL ENCOUNTER (OUTPATIENT)
Dept: HEMATOLOGY ONCOLOGY | Facility: MEDICAL CENTER | Age: 60
End: 2025-08-18
Attending: STUDENT IN AN ORGANIZED HEALTH CARE EDUCATION/TRAINING PROGRAM
Payer: MEDICARE

## 2025-08-18 DIAGNOSIS — C20 RECTAL CANCER (HCC): Primary | ICD-10-CM

## 2025-08-18 ASSESSMENT — ENCOUNTER SYMPTOMS
SPUTUM PRODUCTION: 0
INSOMNIA: 0
BLOOD IN STOOL: 0
SHORTNESS OF BREATH: 0
DOUBLE VISION: 0
FEVER: 0
DIARRHEA: 0
CONSTIPATION: 0
SINUS PAIN: 0
MYALGIAS: 0
DIZZINESS: 0
DIAPHORESIS: 0
SORE THROAT: 0
VOMITING: 0
BRUISES/BLEEDS EASILY: 0
PALPITATIONS: 0
BLURRED VISION: 0
WEIGHT LOSS: 0
WHEEZING: 0
ROS GI COMMENTS: RECTAL DISCOMFORT
NERVOUS/ANXIOUS: 0
HEARTBURN: 0
WEAKNESS: 0
HEADACHES: 0
NAUSEA: 0
CHILLS: 0
COUGH: 0
TINGLING: 0
BACK PAIN: 0
ORTHOPNEA: 0
ABDOMINAL PAIN: 0

## 2025-08-19 ENCOUNTER — HOSPITAL ENCOUNTER (OUTPATIENT)
Dept: RADIATION ONCOLOGY | Facility: MEDICAL CENTER | Age: 60
End: 2025-08-19
Attending: RADIOLOGY
Payer: MEDICARE

## 2025-08-21 ENCOUNTER — TELEMEDICINE (OUTPATIENT)
Facility: MEDICAL CENTER | Age: 60
End: 2025-08-21
Payer: MEDICARE

## 2025-08-21 DIAGNOSIS — C20 RECTAL CANCER (HCC): Primary | ICD-10-CM

## (undated) DEVICE — KIT ENDOSCOPIC LIGHT SIGMOIDOSCOPE WITH OBTURATOR SUCTION INSTRUMENT AND TUBING SET 8FR (10EA/CA)

## (undated) DEVICE — SET LEADWIRE 5 LEAD BEDSIDE DISPOSABLE ECG (1SET OF 5/EA)

## (undated) DEVICE — TUBING CLEARLINK DUO-VENT - C-FLO (48EA/CA)

## (undated) DEVICE — KIT CUSTOM PROCEDURE SINGLE FOR ENDO (15/CA)

## (undated) DEVICE — FORCEP RADIAL JAW 4 STANDARD CAPACITY W/NEEDLE 240CM (40EA/BX)

## (undated) DEVICE — LACTATED RINGERS INJ 1000 ML - (14EA/CA 60CA/PF)

## (undated) DEVICE — GOWN WARMING STANDARD FLEX - (30/CA)

## (undated) DEVICE — SENSOR OXIMETER ADULT SPO2 RD SET (20EA/BX)